# Patient Record
Sex: FEMALE | Race: WHITE | NOT HISPANIC OR LATINO | Employment: OTHER | URBAN - METROPOLITAN AREA
[De-identification: names, ages, dates, MRNs, and addresses within clinical notes are randomized per-mention and may not be internally consistent; named-entity substitution may affect disease eponyms.]

---

## 2017-01-03 RX ORDER — LISINOPRIL 40 MG/1
TABLET ORAL
Qty: 30 TABLET | Refills: 11 | Status: SHIPPED | OUTPATIENT
Start: 2017-01-03 | End: 2018-03-15 | Stop reason: SDUPTHER

## 2017-01-10 ENCOUNTER — OFFICE VISIT (OUTPATIENT)
Dept: INTERNAL MEDICINE | Facility: CLINIC | Age: 82
End: 2017-01-10
Payer: MEDICARE

## 2017-01-10 VITALS
WEIGHT: 145.75 LBS | HEIGHT: 61 IN | DIASTOLIC BLOOD PRESSURE: 62 MMHG | RESPIRATION RATE: 18 BRPM | BODY MASS INDEX: 27.52 KG/M2 | HEART RATE: 76 BPM | SYSTOLIC BLOOD PRESSURE: 140 MMHG | TEMPERATURE: 99 F

## 2017-01-10 DIAGNOSIS — E11.3599 TYPE 2 DIABETES MELLITUS WITH PROLIFERATIVE RETINOPATHY WITHOUT MACULAR EDEMA, WITHOUT LONG-TERM CURRENT USE OF INSULIN, UNSPECIFIED LATERALITY: ICD-10-CM

## 2017-01-10 DIAGNOSIS — I10 ESSENTIAL HYPERTENSION: Primary | ICD-10-CM

## 2017-01-10 DIAGNOSIS — Z95.0 CARDIAC PACEMAKER IN SITU: ICD-10-CM

## 2017-01-10 DIAGNOSIS — I51.89 DIASTOLIC DYSFUNCTION: ICD-10-CM

## 2017-01-10 DIAGNOSIS — I65.23 BILATERAL CAROTID ARTERY STENOSIS: ICD-10-CM

## 2017-01-10 DIAGNOSIS — Z12.11 SCREEN FOR COLON CANCER: ICD-10-CM

## 2017-01-10 DIAGNOSIS — E78.00 HYPERCHOLESTEREMIA: ICD-10-CM

## 2017-01-10 PROCEDURE — 99214 OFFICE O/P EST MOD 30 MIN: CPT | Mod: S$PBB,,, | Performed by: INTERNAL MEDICINE

## 2017-01-10 PROCEDURE — 99213 OFFICE O/P EST LOW 20 MIN: CPT | Mod: PBBFAC,PO | Performed by: INTERNAL MEDICINE

## 2017-01-10 PROCEDURE — 99999 PR PBB SHADOW E&M-EST. PATIENT-LVL III: CPT | Mod: PBBFAC,,, | Performed by: INTERNAL MEDICINE

## 2017-01-10 PROCEDURE — 82270 OCCULT BLOOD FECES: CPT | Mod: PBBFAC,PO | Performed by: INTERNAL MEDICINE

## 2017-01-10 RX ORDER — HYDROCHLOROTHIAZIDE 12.5 MG/1
12.5 CAPSULE ORAL DAILY
COMMUNITY
End: 2017-12-15

## 2017-01-10 NOTE — MR AVS SNAPSHOT
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, without long-term current use of insulin, unspecified laterality         Hypercholesteremia         Cardiac pacemaker in situ         Bilateral carotid artery stenosis         Screen for colon cancer         Diastolic dysfunction                To Do List           Future Appointments        Provider Department Dept Phone    2017 8:30 AM SPECIMEN, [facility] - Specimen Lab [phone]    2017 8:45 AM LAB, [facility] [place] - Laboratory [phone]    2017 8:00 AM HOME MONITOR DEVICE CHECK, [facility] - Arrhythmia [phone]      Goals (5 Years of Data)     None      Follow-Up and Disposition     Return in about 6 months (around 7/10/2017).      [facility]     [facility] On Call Nurse Care Line -  Assistance  Registered nurses in the [facility] On Call Center provide clinical advisement, health education, appointment booking, and other advisory services.  Call for this free service at [phone].             Medications           Message regarding Medications     Verify the changes and/or additions to your medication regime listed below are the same as discussed with your clinician today.  If any of these changes or additions are incorrect, please notify your healthcare provider.        STOP taking these medications     BIOTIN ORAL Take by mouth once daily.    cranberry 500 mg Cap Take by mouth once daily.           Verify that the below list of medications is an accurate representation of the medications  "you are currently taking.  If none reported, the list may be blank. If incorrect, please contact your healthcare provider. Carry this list with you in case of emergency.           Current Medications     ACCU-CHEK GERMÁN PLUS TEST STRP Strp USE ONE STRIP TO TEST DAILY.    ascorbic acid (VITAMIN C) 1000 MG tablet Take 1,000 mg by mouth once daily.    CALCIUM CARBONATE/VITAMIN D3 (VITAMIN D-3 ORAL) Take 500 mg by mouth once daily.    co-enzyme Q-10 30 mg capsule Take 30 mg by mouth once daily.     hydrochlorothiazide (MICROZIDE) 12.5 mg capsule Take 12.5 mg by mouth once daily.    KRILL OIL ORAL Take by mouth once daily.     lisinopril (PRINIVIL,ZESTRIL) 40 MG tablet TAKE ONE TABLET BY MOUTH EVERY DAY    ONE TOUCH DELICA LANCETS 33 gauge Misc     red yeast rice 600 mg Cap Take 1,200 mg by mouth 2 (two) times daily.    blood sugar diagnostic (ACCU-CHEK GERMÁN PLUS TEST STRP) Strp USE ONE STRIP TO TEST DAILY.    glucosamine-chondroitin 500-400 mg tablet Take 1 tablet by mouth 3 (three) times daily.           Clinical Reference Information           Vital Signs - Last Recorded  Most recent update: 1/10/2017  3:34 PM by Isis Richter,     BP Pulse Temp Resp Ht Wt    (!) 140/62 (BP Location: Left arm, Patient Position: Sitting, BP Method: Manual) 76 98.6 °F (37 °C) (Oral) 18 5' 1" (1.549 m) 66.1 kg (145 lb 11.6 oz)    BMI                27.53 kg/m2          Blood Pressure          Most Recent Value    BP  (!)  140/62      Allergies as of 1/10/2017     No Known Allergies      Immunizations Administered on Date of Encounter - 1/10/2017     None      Orders Placed During Today's Visit      Normal Orders This Visit    POCT Hemocult Stool X3     Future Labs/Procedures Expected by Expires    CBC auto differential  1/10/2017 1/10/2019    Comprehensive metabolic panel  1/10/2017 1/10/2019    Hemoglobin A1c  1/10/2017 3/11/2018    Lipid panel  1/10/2017 1/10/2019    Microalbumin/creatinine urine ratio  1/10/2017 1/10/2018    " TSH  1/10/2017 1/10/2019    Urinalysis  1/10/2017 1/10/2019    2D Echo w/ Color Flow Doppler  As directed 1/10/2018    Cardiology Lab Carotid US Bilateral  As directed 1/10/2018      [facility] Sign-Up     Activating your [facility] account is as easy as 1-2-3!     1) Visit [email or web address], select Sign Up Now, enter this activation code and your date of birth, then select Next.  [email or web address]  Expires: 2/24/2017  3:35 PM      2) Create a username and password to use when you visit [facility] in the future and select a security question in case you lose your password and select Next.    3) Enter your e-mail address and click Sign Up!    Additional Information  If you have questions, please e-mail [email or web address] or call [phone] to talk to our [facility] staff. Remember, [facility] is NOT to be used for urgent needs. For medical emergencies, dial 911.

## 2017-01-10 NOTE — PROGRESS NOTES
Subjective:       Patient ID: Luiza Childers is a 83 y.o. female.    Chief Complaint: No chief complaint on file.    Patient is a 83 y.o.female who presents today for follow up. Son present during examination.    Cholesterol: (due now)  Vaccines: Influenza (declines); Tetanus (declines) Pneumovax (declines); Zoster (declines)  Eye exam: up to date  Mammogram: declines  Gyn exam: hysterectomy  Colonoscopy: declines; agrees stool cards  DEXA: declines  Exercise: she is very active  Diet: healthy    Review of Systems   Constitutional: Negative for appetite change, chills, diaphoresis, fatigue and fever.   HENT: Negative for congestion, dental problem, ear discharge, ear pain, hearing loss, postnasal drip, sinus pressure and sore throat.    Eyes: Negative for discharge, redness and itching.   Respiratory: Negative for cough, chest tightness, shortness of breath and wheezing.    Cardiovascular: Negative for chest pain, palpitations and leg swelling.   Gastrointestinal: Negative for abdominal pain, constipation, diarrhea, nausea and vomiting.   Endocrine: Negative for cold intolerance and heat intolerance.   Genitourinary: Negative for difficulty urinating, frequency, hematuria and urgency.   Musculoskeletal: Negative for arthralgias, back pain, gait problem, myalgias and neck pain.   Skin: Negative for color change and rash.   Neurological: Negative for dizziness, syncope and headaches.   Hematological: Negative for adenopathy.   Psychiatric/Behavioral: Negative for behavioral problems and sleep disturbance. The patient is not nervous/anxious.        Objective:      Physical Exam   Constitutional: She is oriented to person, place, and time. She appears well-developed and well-nourished. No distress.   HENT:   Head: Normocephalic and atraumatic.   Right Ear: Tympanic membrane and external ear normal.   Left Ear: Tympanic membrane and external ear normal.   Nose: Nose normal. No mucosal edema or rhinorrhea.    Mouth/Throat: Uvula is midline, oropharynx is clear and moist and mucous membranes are normal. No oropharyngeal exudate, posterior oropharyngeal edema, posterior oropharyngeal erythema or tonsillar abscesses.   Eyes: Conjunctivae and EOM are normal. Pupils are equal, round, and reactive to light. Right eye exhibits no discharge. Left eye exhibits no discharge. No scleral icterus.   Neck: Normal range of motion. Neck supple. No JVD present. No thyromegaly present.   Cardiovascular: Normal rate, regular rhythm, normal heart sounds and intact distal pulses.  Exam reveals no gallop and no friction rub.    No murmur heard.  Pulmonary/Chest: Effort normal and breath sounds normal. No stridor. No respiratory distress. She has no wheezes. She has no rales. She exhibits no tenderness.   Abdominal: Soft. Bowel sounds are normal. She exhibits no distension. There is no tenderness. There is no rebound.   Musculoskeletal: Normal range of motion. She exhibits no edema or tenderness.   Lymphadenopathy:     She has no cervical adenopathy.   Neurological: She is alert and oriented to person, place, and time.   Skin: Skin is warm. No rash noted. She is not diaphoretic. No erythema.   Psychiatric: She has a normal mood and affect. Her behavior is normal.   Nursing note and vitals reviewed.      Assessment and Plan:       1. Essential hypertension  - stable on lisinopril and hctz  - CBC auto differential; Future  - Comprehensive metabolic panel; Future  - TSH; Future  - Lipid panel; Future  - Hemoglobin A1c; Future  - Urinalysis; Future  - Microalbumin/creatinine urine ratio; Future    2. 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 Hypercholesteremia  - diet controlled  - CBC auto differential; Future  - Comprehensive metabolic panel; Future  - TSH; Future  - Lipid panel; Future  - Hemoglobin A1c; Future  - Urinalysis; Future  - Microalbumin/creatinine urine ratio; Future    4. Cardiac pacemaker in situ  - follows with cardio  - CBC auto differential; Future  - Comprehensive metabolic panel; Future  - TSH; Future  - Lipid panel; Future  - Hemoglobin A1c; Future  - Urinalysis; Future  - Microalbumin/creatinine urine ratio; Future    5. Bilateral carotid artery stenosis  - Cardiology Lab Carotid US Bilateral; Future    6. Screen for colon cancer  - POCT Hemocult Stool X3    7. Diastolic dysfunction  - 2D Echo w/ Color Flow Doppler; Future        No Follow-up on file.

## 2017-01-11 ENCOUNTER — LAB VISIT (OUTPATIENT)
Dept: LAB | Facility: HOSPITAL | Age: 82
End: 2017-01-11
Attending: INTERNAL MEDICINE
Payer: MEDICARE

## 2017-01-11 ENCOUNTER — TELEPHONE (OUTPATIENT)
Dept: INTERNAL MEDICINE | Facility: CLINIC | Age: 82
End: 2017-01-11

## 2017-01-11 DIAGNOSIS — N18.30 CONTROLLED TYPE 2 DIABETES MELLITUS WITH STAGE 3 CHRONIC KIDNEY DISEASE, WITHOUT LONG-TERM CURRENT USE OF INSULIN: ICD-10-CM

## 2017-01-11 DIAGNOSIS — I65.23 BILATERAL CAROTID ARTERY STENOSIS: ICD-10-CM

## 2017-01-11 DIAGNOSIS — I10 ESSENTIAL HYPERTENSION: ICD-10-CM

## 2017-01-11 DIAGNOSIS — E11.3599 TYPE 2 DIABETES MELLITUS WITH PROLIFERATIVE RETINOPATHY WITHOUT MACULAR EDEMA, WITHOUT LONG-TERM CURRENT USE OF INSULIN, UNSPECIFIED LATERALITY: ICD-10-CM

## 2017-01-11 DIAGNOSIS — Z95.0 CARDIAC PACEMAKER IN SITU: ICD-10-CM

## 2017-01-11 DIAGNOSIS — E11.22 CONTROLLED TYPE 2 DIABETES MELLITUS WITH STAGE 3 CHRONIC KIDNEY DISEASE, WITHOUT LONG-TERM CURRENT USE OF INSULIN: ICD-10-CM

## 2017-01-11 DIAGNOSIS — Z12.11 SCREEN FOR COLON CANCER: ICD-10-CM

## 2017-01-11 DIAGNOSIS — E78.00 HYPERCHOLESTEREMIA: ICD-10-CM

## 2017-01-11 DIAGNOSIS — E78.5 HYPERLIPIDEMIA, UNSPECIFIED HYPERLIPIDEMIA TYPE: Primary | ICD-10-CM

## 2017-01-11 LAB
ALBUMIN SERPL BCP-MCNC: 4.1 G/DL
ALP SERPL-CCNC: 45 U/L
ALT SERPL W/O P-5'-P-CCNC: 12 U/L
ANION GAP SERPL CALC-SCNC: 9 MMOL/L
AST SERPL-CCNC: 19 U/L
BASOPHILS # BLD AUTO: 0.01 K/UL
BASOPHILS NFR BLD: 0.2 %
BILIRUB SERPL-MCNC: 0.3 MG/DL
BUN SERPL-MCNC: 35 MG/DL
CALCIUM SERPL-MCNC: 10.1 MG/DL
CHLORIDE SERPL-SCNC: 99 MMOL/L
CHOLEST/HDLC SERPL: 6.3 {RATIO}
CO2 SERPL-SCNC: 26 MMOL/L
CREAT SERPL-MCNC: 1.1 MG/DL
DIFFERENTIAL METHOD: ABNORMAL
EOSINOPHIL # BLD AUTO: 0.3 K/UL
EOSINOPHIL NFR BLD: 4.9 %
ERYTHROCYTE [DISTWIDTH] IN BLOOD BY AUTOMATED COUNT: 13.5 %
EST. GFR  (AFRICAN AMERICAN): 53.7 ML/MIN/1.73 M^2
EST. GFR  (NON AFRICAN AMERICAN): 46.5 ML/MIN/1.73 M^2
ESTIMATED AVG GLUCOSE: 140 MG/DL
GLUCOSE SERPL-MCNC: 143 MG/DL
HBA1C MFR BLD HPLC: 6.5 %
HCT VFR BLD AUTO: 35.9 %
HDL/CHOLESTEROL RATIO: 15.9 %
HDLC SERPL-MCNC: 347 MG/DL
HDLC SERPL-MCNC: 55 MG/DL
HGB BLD-MCNC: 11.7 G/DL
LDLC SERPL CALC-MCNC: 265.4 MG/DL
LYMPHOCYTES # BLD AUTO: 1.7 K/UL
LYMPHOCYTES NFR BLD: 32 %
MCH RBC QN AUTO: 28.7 PG
MCHC RBC AUTO-ENTMCNC: 32.6 %
MCV RBC AUTO: 88 FL
MONOCYTES # BLD AUTO: 0.4 K/UL
MONOCYTES NFR BLD: 8.1 %
NEUTROPHILS # BLD AUTO: 2.9 K/UL
NEUTROPHILS NFR BLD: 54.6 %
NONHDLC SERPL-MCNC: 292 MG/DL
PLATELET # BLD AUTO: 237 K/UL
PMV BLD AUTO: 10.6 FL
POTASSIUM SERPL-SCNC: 4.5 MMOL/L
PROT SERPL-MCNC: 7.7 G/DL
RBC # BLD AUTO: 4.08 M/UL
SODIUM SERPL-SCNC: 134 MMOL/L
TRIGL SERPL-MCNC: 133 MG/DL
TSH SERPL DL<=0.005 MIU/L-ACNC: 3.63 UIU/ML
WBC # BLD AUTO: 5.34 K/UL

## 2017-01-11 PROCEDURE — 36415 COLL VENOUS BLD VENIPUNCTURE: CPT | Mod: PO

## 2017-01-11 PROCEDURE — 80053 COMPREHEN METABOLIC PANEL: CPT

## 2017-01-11 PROCEDURE — 80061 LIPID PANEL: CPT

## 2017-01-11 PROCEDURE — 84443 ASSAY THYROID STIM HORMONE: CPT

## 2017-01-11 PROCEDURE — 85025 COMPLETE CBC W/AUTO DIFF WBC: CPT

## 2017-01-11 PROCEDURE — 83036 HEMOGLOBIN GLYCOSYLATED A1C: CPT

## 2017-01-11 NOTE — TELEPHONE ENCOUNTER
Notify pt of results:   - diabetic marker increased from 5.8 to 6.5; recommend starting diabetic diet; please mail to patient; we discussed it a bit during her visit  - thyroid is normal  - kidney function has decreased due to diabetes worsening; she is no longer a prediabetic she is a diabetic; f/u with three months with labs  - cholesterol is uncontrolled; LDL is 265; we need to start lipitor to see if it lowers her levels; if she is agreeable i will send to her pharmacy; also mail low cholesterol diet; limit fried foods, red meat and cheese  - blood counts are stable  - diabetes and high cholesterol places her at risk for heart attack so we need to get these controlled

## 2017-01-12 RX ORDER — ATORVASTATIN CALCIUM 20 MG/1
20 TABLET, FILM COATED ORAL NIGHTLY
Qty: 30 TABLET | Refills: 6 | Status: SHIPPED | OUTPATIENT
Start: 2017-01-12 | End: 2018-05-08

## 2017-01-12 NOTE — TELEPHONE ENCOUNTER
Spoke to pt and pt's son, reviewed lab result. Pt verbalized understanding. Please send lipitor to pharmacy. Diets mailed to pt.

## 2017-01-12 NOTE — TELEPHONE ENCOUNTER
Med sent; Repeat cmp one month after starting lipitor to make sure it is not affecting liver; nonfasting lab; stop red yeast rice when starting lipitor; start coenzyme q 10 supplement daily with lipitor

## 2017-02-22 RX ORDER — HYDROCHLOROTHIAZIDE 12.5 MG/1
CAPSULE ORAL
Qty: 30 CAPSULE | Refills: 11 | Status: SHIPPED | OUTPATIENT
Start: 2017-02-22 | End: 2017-04-12 | Stop reason: SDUPTHER

## 2017-02-27 ENCOUNTER — CLINICAL SUPPORT (OUTPATIENT)
Dept: ELECTROPHYSIOLOGY | Facility: CLINIC | Age: 82
End: 2017-02-27
Payer: MEDICARE

## 2017-02-27 DIAGNOSIS — Z95.0 CARDIAC PACEMAKER IN SITU: ICD-10-CM

## 2017-02-27 DIAGNOSIS — R00.1 SYMPTOMATIC BRADYCARDIA: ICD-10-CM

## 2017-02-27 PROCEDURE — 93294 REM INTERROG EVL PM/LDLS PM: CPT | Mod: ,,, | Performed by: INTERNAL MEDICINE

## 2017-02-27 PROCEDURE — 93296 REM INTERROG EVL PM/IDS: CPT | Mod: PBBFAC | Performed by: INTERNAL MEDICINE

## 2017-03-14 ENCOUNTER — TELEPHONE (OUTPATIENT)
Dept: INTERNAL MEDICINE | Facility: CLINIC | Age: 82
End: 2017-03-14

## 2017-03-14 NOTE — TELEPHONE ENCOUNTER
----- Message from Nicol Beckham LPN sent at 1/10/2017  3:15 PM CST -----  Carotid US and Echo due in March, already ordered.

## 2017-03-28 LAB
CTP QC/QA: YES
FECAL OCCULT BLOOD, POC: NEGATIVE

## 2017-04-12 ENCOUNTER — OFFICE VISIT (OUTPATIENT)
Dept: INTERNAL MEDICINE | Facility: CLINIC | Age: 82
End: 2017-04-12
Payer: MEDICARE

## 2017-04-12 ENCOUNTER — TELEPHONE (OUTPATIENT)
Dept: INTERNAL MEDICINE | Facility: CLINIC | Age: 82
End: 2017-04-12

## 2017-04-12 VITALS
SYSTOLIC BLOOD PRESSURE: 139 MMHG | WEIGHT: 140 LBS | RESPIRATION RATE: 16 BRPM | BODY MASS INDEX: 26.43 KG/M2 | TEMPERATURE: 99 F | DIASTOLIC BLOOD PRESSURE: 61 MMHG | HEIGHT: 61 IN | HEART RATE: 71 BPM

## 2017-04-12 DIAGNOSIS — I65.23 BILATERAL CAROTID ARTERY STENOSIS: ICD-10-CM

## 2017-04-12 DIAGNOSIS — E78.00 HYPERCHOLESTEREMIA: ICD-10-CM

## 2017-04-12 DIAGNOSIS — I10 ESSENTIAL HYPERTENSION: ICD-10-CM

## 2017-04-12 DIAGNOSIS — R41.3 SHORT-TERM MEMORY LOSS: ICD-10-CM

## 2017-04-12 DIAGNOSIS — E11.3599 TYPE 2 DIABETES MELLITUS WITH PROLIFERATIVE RETINOPATHY WITHOUT MACULAR EDEMA, WITHOUT LONG-TERM CURRENT USE OF INSULIN, UNSPECIFIED LATERALITY: Primary | ICD-10-CM

## 2017-04-12 PROCEDURE — 99999 PR PBB SHADOW E&M-EST. PATIENT-LVL III: CPT | Mod: PBBFAC,,, | Performed by: INTERNAL MEDICINE

## 2017-04-12 PROCEDURE — 99213 OFFICE O/P EST LOW 20 MIN: CPT | Mod: PBBFAC,PO | Performed by: INTERNAL MEDICINE

## 2017-04-12 PROCEDURE — 99214 OFFICE O/P EST MOD 30 MIN: CPT | Mod: S$PBB,,, | Performed by: INTERNAL MEDICINE

## 2017-04-12 NOTE — TELEPHONE ENCOUNTER
1. Call pt to reschedule labs; she cancelled and stated she was going to be out of town; if she doesn't schedule, Let me know and I will call her.   2. Also have kecia call her to schedule neurology    Spoke to her son. When she left the office today, she closed her checking account. Son is very concerned for her mental state of health. He is considering to get legal power of . During visit, pt was agreeable to labs tomorrow and neuro eval; however, she cancelled each after her visit. She is displaying irrational behavior.

## 2017-04-12 NOTE — PROGRESS NOTES
Subjective:       Patient ID: Luiza Childers is a 83 y.o. female.    Chief Complaint: Follow-up (3 month)    Patient is a 83 y.o.female who presents today for follow up.    Cholesterol: (due now)  Vaccines: Influenza (declines); Tetanus (declines) Pneumovax (declines); Zoster (declines)  Eye exam: up to date  Mammogram: declines  Gyn exam: hysterectomy  Colonoscopy: declines; agrees stool cards  DEXA: declines  Exercise: she is very active  Diet: healthy      She is not taking her cholesterol medication.  She does admit to this.    Son is concerned that she is getting dementia. He is not present today as she told him she didn't want him here. He always comes to her appointments. He did notify the clinic that she has been irrational at home; she is blaming him on misplacing her brush , etc. She went to the grocery and her card was declined so she blamed her son. They do live together. She is misplacing items at home. He is worried that she is not taking her medication regularly. Patient agrees that her short term memory is poor but she thinks her son is overacting.         Review of Systems   Constitutional: Negative for appetite change, chills, diaphoresis, fatigue and fever.   HENT: Negative for congestion, dental problem, ear discharge, ear pain, hearing loss, postnasal drip, sinus pressure and sore throat.    Eyes: Negative for discharge, redness and itching.   Respiratory: Negative for cough, chest tightness, shortness of breath and wheezing.    Cardiovascular: Negative for chest pain, palpitations and leg swelling.   Gastrointestinal: Negative for abdominal pain, constipation, diarrhea, nausea and vomiting.   Endocrine: Negative for cold intolerance and heat intolerance.   Genitourinary: Negative for difficulty urinating, frequency, hematuria and urgency.   Musculoskeletal: Negative for arthralgias, back pain, gait problem, myalgias and neck pain.   Skin: Negative for color change and rash.   Neurological:  Negative for dizziness, syncope and headaches.        Memory loss   Hematological: Negative for adenopathy.   Psychiatric/Behavioral: Negative for behavioral problems and sleep disturbance. The patient is not nervous/anxious.        Objective:      Physical Exam   Constitutional: She is oriented to person, place, and time. She appears well-developed and well-nourished. No distress.   HENT:   Head: Normocephalic and atraumatic.   Right Ear: External ear normal.   Left Ear: External ear normal.   Eyes: Conjunctivae and EOM are normal. Pupils are equal, round, and reactive to light. Right eye exhibits no discharge. Left eye exhibits no discharge. No scleral icterus.   Neck: Normal range of motion. Neck supple. No JVD present. No thyromegaly present.   Cardiovascular: Normal rate, regular rhythm, normal heart sounds and intact distal pulses.  Exam reveals no gallop and no friction rub.    No murmur heard.  Pulmonary/Chest: Effort normal and breath sounds normal. No stridor. No respiratory distress. She has no wheezes. She has no rales. She exhibits no tenderness.   Abdominal: Soft. Bowel sounds are normal. She exhibits no distension. There is no tenderness. There is no rebound.   Musculoskeletal: Normal range of motion. She exhibits no edema or tenderness.   Lymphadenopathy:     She has no cervical adenopathy.   Neurological: She is alert and oriented to person, place, and time.   Skin: Skin is warm. No rash noted. She is not diaphoretic. No erythema.   Psychiatric: She has a normal mood and affect. Her speech is normal. Thought content normal. She is withdrawn (irritated with her son). She expresses impulsivity. She exhibits abnormal recent memory.   Nursing note and vitals reviewed.      Assessment and Plan:       1. 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 Hypercholesteremia  - stopped statin against medical advice; check lipid now; will need to restart medication  - Hemoglobin A1c; Future  - Comprehensive metabolic panel; Future  - Lipid panel; Future    3. Essential hypertension  - stable on meds  - Hemoglobin A1c; Future  - Comprehensive metabolic panel; Future  - Lipid panel; Future    4. Bilateral carotid artery stenosis  - ultrasound due in july  - Hemoglobin A1c; Future  - Comprehensive metabolic panel; Future  - Lipid panel; Future    5. Short-term memory loss  - Ambulatory Referral to Neurology  - concerned that her behavior is irrational; her disposition today is not her usual.         Return in about 3 months (around 7/12/2017).

## 2017-04-12 NOTE — TELEPHONE ENCOUNTER
Pt's son called to discuss a few things prior to Dr. Larson seeing pt today. He stated that pt's cognitive memory is going. She is misplacing items and accusing son that he is hiding them on her on purpose. He stated that she normally doesn't go places i.e. Grocery shopping, alone due to being overwhelmed at the check out process of people looking at her and her having to pull out personal information. He reported that she went alone the other day and her credit card was declined, pt's son said that he was accused of this problem. He stated that pt called a lock's smith and had a lock put on her bedroom door, but this morning it was just left wide open. He stated he does live with pt to help keep an eye on her, he is attached to her bank accounts but denies every using them. He is concerned that if she goes switching things she isn't going to remember and he won't have access to help her. He also stated that pt isn't taking lipitor, and he isn't sure she is taking her medications regularly. He stated that he tried to get her a medicine planner, but she refused stating that she has her own system of turning bottles over once taken. The problem is that they are always turned over. He stated that pt gets upset when he tries to suggest healthier snack choices claiming that he is trying to control her. He stated that he wanted to bring her to the apt today but pt refused to allow him.

## 2017-04-12 NOTE — TELEPHONE ENCOUNTER
----- Message from Natacha Gloria sent at 4/12/2017 11:17 AM CDT -----  Contact: Aashish underwood- [phone]  Follow up about mom's appointment on today.

## 2017-04-12 NOTE — MR AVS SNAPSHOT
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      [facility] On Call     [facility] Nurse Care Line -  Assistance  Unless otherwise directed by your provider, please contact [facility], our nurse care line that is available for  assistance.     Registered nurses in the [facility] Center provide: appointment scheduling, clinical advisement, health education, and other advisory services.  Call: [phone] (toll free)               Medications           Message regarding Medications     Verify the changes and/or additions to your medication regime listed below are the same as discussed with your clinician today.  If any of these changes or additions are incorrect, please notify your healthcare provider.             Verify that the below list of medications is an accurate representation of the medications you are currently taking.  If none reported, the list may be blank. If incorrect, please contact  "your healthcare provider. Carry this list with you in case of emergency.           Current Medications     ACCU-CHEK GERMÁN PLUS TEST STRP Strp USE ONE STRIP TO TEST DAILY.    ascorbic acid (VITAMIN C) 1000 MG tablet Take 1,000 mg by mouth once daily.    atorvastatin (LIPITOR) 20 MG tablet Take 1 tablet (20 mg total) by mouth every evening.    blood sugar diagnostic (ACCU-CHEK GERMÁN PLUS TEST STRP) Strp USE ONE STRIP TO TEST DAILY.    CALCIUM CARBONATE/VITAMIN D3 (VITAMIN D-3 ORAL) Take 500 mg by mouth once daily.    co-enzyme Q-10 30 mg capsule Take 30 mg by mouth once daily.     glucosamine-chondroitin 500-400 mg tablet Take 1 tablet by mouth 3 (three) times daily.    hydrochlorothiazide (MICROZIDE) 12.5 mg capsule Take 12.5 mg by mouth once daily.    KRILL OIL ORAL Take by mouth once daily.     lisinopril (PRINIVIL,ZESTRIL) 40 MG tablet TAKE ONE TABLET BY MOUTH EVERY DAY    ONE TOUCH DELICA LANCETS 33 gauge [facility]            Clinical Reference Information           Your Vitals Were     BP Pulse Temp Resp Height Weight    139/61 (BP Location: Left arm, Patient Position: Sitting, BP Method: Manual) 71 98.5 °F (36.9 °C) (Oral) 16 5' 1" (1.549 m) 63.5 kg (139 lb 15.9 oz)    BMI                26.45 kg/m2          Blood Pressure          Most Recent Value    BP  139/61      Allergies as of 4/12/2017     No Known Allergies      Immunizations Administered on Date of Encounter - 4/12/2017     None      Orders Placed During Today's Visit      Normal Orders This Visit    Ambulatory Referral to Neurology     Future Labs/Procedures Expected by Expires    Comprehensive metabolic panel  4/12/2017 4/12/2019    Hemoglobin A1c  4/12/2017 6/11/2018    Lipid panel  4/12/2017 4/12/2019      [facility] Sign-Up     Activating your [facility] account is as easy as 1-2-3!     1) Visit [email or web address], select Sign Up Now, enter this activation code and your date of birth, then select Next.  [email or web address]  Expires: 5/27/2017  9:05 AM  "     2) Create a username and password to use when you visit [facility] in the future and select a security question in case you lose your password and select Next.    3) Enter your e-mail address and click Sign Up!    Additional Information  If you have questions, please e-mail [email or web address] or call [phone] to talk to our [facility] [employer] staff. Remember, [facility] is NOT to be used for urgent needs. For medical emergencies, dial 911.         Language Assistance Services     ATTENTION: Language assistance services are available, free of charge. Please call [phone].      ATENCIÓN: Si habla español, tiene a jewell disposición servicios gratuitos de asistencia lingüística. Llame al [phone].     CHÚ Ý: N?u b?n nói Ti?ng Vi?t, có các d?ch v? h? tr? ngôn ng? mi?n phí dành cho b?n. G?i s? [phone].         [place] [facility] complies with applicable Federal civil rights laws and does not discriminate on the basis of race, color, national origin, age, disability, or sex.

## 2017-04-12 NOTE — TELEPHONE ENCOUNTER
Spoke to pt's son, briefly discussed apt with pt's son. He stated that she left her apt here and closed a checking account. Informed that message would be sent to Dr. Larson to see if she has time to contact pt's son. He verbalized understanding if  is not able to.

## 2017-04-12 NOTE — TELEPHONE ENCOUNTER
----- Message from Wild Meza sent at 4/12/2017  8:19 AM CDT -----  Contact: Aashish/ [phone]  Pt son would like to speak to nurse/doctor regarding pt conditions.    Please advise

## 2017-04-13 NOTE — TELEPHONE ENCOUNTER
Spoke to pt who stated that she cancelled her apts because her car battery . Stated that she didn't have transportation. She stated that someone is bringing her a new battery tomorrow. Offered to schedule lab apt next week. Pt refused stating that she is going out of town on  to [place] to visit her daughter's family. Asked her return date, she stated that she wasn't sure when she would be returning.

## 2017-05-30 ENCOUNTER — CLINICAL SUPPORT (OUTPATIENT)
Dept: ELECTROPHYSIOLOGY | Facility: CLINIC | Age: 82
End: 2017-05-30
Payer: MEDICARE

## 2017-05-30 DIAGNOSIS — R00.1 SYMPTOMATIC BRADYCARDIA: ICD-10-CM

## 2017-05-30 DIAGNOSIS — Z95.0 CARDIAC PACEMAKER IN SITU: ICD-10-CM

## 2017-05-30 PROCEDURE — 93294 REM INTERROG EVL PM/LDLS PM: CPT | Mod: ,,, | Performed by: INTERNAL MEDICINE

## 2017-08-02 ENCOUNTER — TELEPHONE (OUTPATIENT)
Dept: ELECTROPHYSIOLOGY | Facility: CLINIC | Age: 82
End: 2017-08-02

## 2017-08-02 NOTE — TELEPHONE ENCOUNTER
----- Message from Brina Wiggins sent at 8/2/2017  4:17 PM CDT -----  Contact: pt   Pt called to see if she could change doctors to a doctor at the [place] location.  I explained to her that there is not an electrophysiologist that goes to [place].  She said she never sees Dr. Del Angel, she always sees Martita.  She called earlier this morning.  She can be reached @ [phone].  She states it is too far for her to come.  Lin spoke to her also today too.  Thanks

## 2017-08-02 NOTE — TELEPHONE ENCOUNTER
Explained to patient that the appointment scheduled on 9/8 is her annual in clinic check. Patient was stating her age saying she's to old to be traveling back and forward from the hospital.Patient was requesting to have her device check via remotely.Patient was informed that her in clinic check appointment is needed. Patient acknowledged and will have her son to bring her into the clinic day of scheduled appointment.

## 2017-08-02 NOTE — TELEPHONE ENCOUNTER
----- Message from Cathy Danielle MA sent at 8/2/2017  3:40 PM CDT -----  Contact: patient called  Please call the patient at home she need  To talk to you about her visit on 9-8-2017. Thank you.

## 2017-08-22 NOTE — TELEPHONE ENCOUNTER
Reason for call:  Patient reporting a symptom  Symptom or request: back surgery  Duration (how long have symptoms been present): 1 week ago  Have you been treated for this before? Yes  Additional comments: patient would like more pain medication that was given at the hospital  Phone Number patient can be reached at:  Home number on file [phone] (home)  Best Time:  any  Can we leave a detailed message on this number:  YES  Call taken on 8/22/2017 at 10:58 AM by AMAURI CUADRA   Returned patient call. Appointment scheduled for follow-up appointment and mailed to patient. Patient confirmed appointments.

## 2017-08-31 ENCOUNTER — TELEPHONE (OUTPATIENT)
Dept: ELECTROPHYSIOLOGY | Facility: CLINIC | Age: 82
End: 2017-08-31

## 2017-08-31 NOTE — TELEPHONE ENCOUNTER
[employer] has devised a cybersecurity upgrade to their firmware. The upgrades will take place during routine device checks, using the device . There is a very rare (approximately 1 in 30,000) chance that the pacemaker upgrade will result in device failure. Our clinic is in the process of devising a plan for patients like yourself. We will be in contact with you. These upgrades are not critical and will be performed over the upcoming months.

## 2017-09-30 DIAGNOSIS — R55 SYNCOPE, UNSPECIFIED SYNCOPE TYPE: Primary | ICD-10-CM

## 2017-10-25 DIAGNOSIS — R55 SYNCOPE, UNSPECIFIED SYNCOPE TYPE: Primary | ICD-10-CM

## 2017-10-26 ENCOUNTER — TELEPHONE (OUTPATIENT)
Dept: INTERNAL MEDICINE | Facility: CLINIC | Age: 82
End: 2017-10-26

## 2017-10-26 ENCOUNTER — CLINICAL SUPPORT (OUTPATIENT)
Dept: CARDIOLOGY | Facility: CLINIC | Age: 82
End: 2017-10-26
Payer: MEDICARE

## 2017-10-26 DIAGNOSIS — I51.89 DIASTOLIC DYSFUNCTION: ICD-10-CM

## 2017-10-26 LAB
ESTIMATED PA SYSTOLIC PRESSURE: 23.07
MITRAL VALVE REGURGITATION: ABNORMAL
RETIRED EF AND QEF - SEE NOTES: 60 (ref 55–65)

## 2017-10-26 PROCEDURE — 93306 TTE W/DOPPLER COMPLETE: CPT | Mod: PBBFAC,PO | Performed by: INTERNAL MEDICINE

## 2017-10-27 NOTE — TELEPHONE ENCOUNTER
Notify pt and her son that her echo reveals a progression in her mitral valve disease; recommend f/u with dr. Del Angel in cardiology

## 2017-10-30 NOTE — TELEPHONE ENCOUNTER
----- Message from Tammy Chang sent at 10/27/2017  4:30 PM CDT -----  Contact: Ketan Zendejas [phone]  Patient is returning a phone call.  Who left a message for the patient: Nicol  Does patient know what this is regarding:    Comments:

## 2017-10-31 ENCOUNTER — TELEPHONE (OUTPATIENT)
Dept: ELECTROPHYSIOLOGY | Facility: CLINIC | Age: 82
End: 2017-10-31

## 2017-10-31 NOTE — TELEPHONE ENCOUNTER
Returned sons call. He stated Dr Hennessy's office wanted to make sure dr Del Angel saw results of  Echo. Advised him that DR Del Angel did see results. Explained her heart functions was normal and as a now the moderate regurgitation would be to watch. Advised that Dr Del Angel would see them on 12/15 at her next appt. He voiced understanding.

## 2017-10-31 NOTE — TELEPHONE ENCOUNTER
----- Message from Suman Del Angel MD sent at 10/31/2017  3:31 PM CDT -----  Contact: pt son/Aashish  yes  normal LVEF, moderate mitral regurgitation.  pt should have an appt coming up, right?  DPM    ----- Message -----  From: Elizabeth Lujan RN  Sent: 10/31/2017   3:24 PM  To: Suman Del Angel MD    Did you see results    ----- Message -----  From: Trish Kahn  Sent: 10/30/2017  11:00 AM  To: Elizabeth Ljuan RN    Please call pt son at [phone]. Need to discuss the ECHO results done 10/26/17 ordered by Dr Del Angel    Thank you

## 2017-12-15 ENCOUNTER — OFFICE VISIT (OUTPATIENT)
Dept: ELECTROPHYSIOLOGY | Facility: CLINIC | Age: 82
End: 2017-12-15
Payer: MEDICARE

## 2017-12-15 ENCOUNTER — CLINICAL SUPPORT (OUTPATIENT)
Dept: ELECTROPHYSIOLOGY | Facility: CLINIC | Age: 82
End: 2017-12-15
Payer: MEDICARE

## 2017-12-15 VITALS
DIASTOLIC BLOOD PRESSURE: 58 MMHG | BODY MASS INDEX: 27.6 KG/M2 | WEIGHT: 146.19 LBS | HEIGHT: 61 IN | SYSTOLIC BLOOD PRESSURE: 164 MMHG | HEART RATE: 60 BPM

## 2017-12-15 DIAGNOSIS — I49.5 SSS (SICK SINUS SYNDROME): ICD-10-CM

## 2017-12-15 DIAGNOSIS — I10 ESSENTIAL HYPERTENSION: Primary | ICD-10-CM

## 2017-12-15 DIAGNOSIS — R00.1 SYMPTOMATIC BRADYCARDIA: ICD-10-CM

## 2017-12-15 DIAGNOSIS — Z95.0 CARDIAC PACEMAKER: ICD-10-CM

## 2017-12-15 DIAGNOSIS — Z95.0 CARDIAC PACEMAKER IN SITU: ICD-10-CM

## 2017-12-15 DIAGNOSIS — E78.00 HYPERCHOLESTEREMIA: ICD-10-CM

## 2017-12-15 DIAGNOSIS — R55 SYNCOPE, UNSPECIFIED SYNCOPE TYPE: ICD-10-CM

## 2017-12-15 PROCEDURE — 99999 PR PBB SHADOW E&M-EST. PATIENT-LVL III: CPT | Mod: PBBFAC,,, | Performed by: INTERNAL MEDICINE

## 2017-12-15 PROCEDURE — 99213 OFFICE O/P EST LOW 20 MIN: CPT | Mod: PBBFAC | Performed by: INTERNAL MEDICINE

## 2017-12-15 PROCEDURE — 93280 PM DEVICE PROGR EVAL DUAL: CPT | Mod: PBBFAC | Performed by: INTERNAL MEDICINE

## 2017-12-15 PROCEDURE — 93005 ELECTROCARDIOGRAM TRACING: CPT | Mod: PBBFAC | Performed by: INTERNAL MEDICINE

## 2017-12-15 PROCEDURE — 99214 OFFICE O/P EST MOD 30 MIN: CPT | Mod: S$PBB,,, | Performed by: INTERNAL MEDICINE

## 2017-12-15 PROCEDURE — 93010 ELECTROCARDIOGRAM REPORT: CPT | Mod: ,,, | Performed by: INTERNAL MEDICINE

## 2017-12-15 RX ORDER — CHLORTHALIDONE 25 MG/1
25 TABLET ORAL DAILY
Qty: 30 TABLET | Refills: 11 | Status: SHIPPED | OUTPATIENT
Start: 2017-12-15 | End: 2018-02-14 | Stop reason: SDUPTHER

## 2017-12-15 NOTE — PROGRESS NOTES
Subjective:    Patient ID:  Luiza Childers is a 84 y.o. female who presents for evaluation of No chief complaint on file.      HPI   84 y.o. F  DM   HTN on meds    HL on meds     Long hx of bradycardia.  Had syncope at [employer]: fell over in pew. Felt warm, nauseated.  Typically has fairly good exertion tolerance, though recently she's noted that she has a hard time walking a mile.  In addition, she feels less mentally acute than usual.  No CP, no FRANK. No syncope ever before.    Since PPM placed, no recurrence. 84% A paced.  Feels very well.    Holter: SR/SB, 32-79 bpm. Profound SB while sleeping, with junctional escape.  Echo 60% LVEF, 2+ MR.  TSH wnl    My interpretation of today's ECG is APVS    Review of Systems   Constitution: Negative for weakness.   HENT: Negative.  Negative for ear pain and tinnitus.    Eyes: Negative for blurred vision.   Cardiovascular: Negative for chest pain, dyspnea on exertion, near-syncope and palpitations.   Respiratory: Negative.  Negative for shortness of breath.    Endocrine: Negative.  Negative for polyuria.   Hematologic/Lymphatic: Does not bruise/bleed easily.   Skin: Negative.  Negative for rash.   Musculoskeletal: Negative.  Negative for joint pain and muscle weakness.   Gastrointestinal: Negative.  Negative for abdominal pain and change in bowel habit.   Genitourinary: Negative for frequency.   Neurological: Positive for difficulty with concentration. Negative for dizziness.   Psychiatric/Behavioral: Negative.  Negative for depression. The patient is not nervous/anxious.    Allergic/Immunologic: Negative for environmental allergies.        Objective:    Physical Exam   Constitutional: She is oriented to person, place, and time. Vital signs are normal. She appears well-developed and well-nourished. She is active and cooperative.   HENT:   Head: Normocephalic and atraumatic.   Eyes: Conjunctivae and EOM are normal.   Neck: Normal range of motion. Carotid bruit is not present.  No tracheal deviation and no edema present. No thyroid mass and no thyromegaly present.   Cardiovascular: Regular rhythm, normal heart sounds, intact distal pulses and normal pulses.   No extrasystoles are present. Bradycardia present.  PMI is not displaced.  Exam reveals no gallop and no friction rub.    No murmur heard.  Pulmonary/Chest: Effort normal and breath sounds normal. No respiratory distress. She has no wheezes. She has no rales.   Abdominal: Soft. Normal appearance. She exhibits no distension. There is no hepatosplenomegaly.   Musculoskeletal: Normal range of motion.   Neurological: She is alert and oriented to person, place, and time. Coordination normal.   Skin: Skin is warm and dry. No rash noted.   Psychiatric: She has a normal mood and affect. Her speech is normal and behavior is normal. Thought content normal. Cognition and memory are normal.   Nursing note and vitals reviewed.        Assessment:       1. Essential hypertension    2. Hypercholesteremia    3. Symptomatic bradycardia    4. Cardiac pacemaker in situ    5. Syncope, unspecified syncope type         Plan:       Doing great since PPM.  Cont f/u in device clinic.  Return in 1 year with echo, or earlier prn.

## 2018-02-14 DIAGNOSIS — I10 ESSENTIAL HYPERTENSION: Primary | ICD-10-CM

## 2018-02-14 RX ORDER — CHLORTHALIDONE 25 MG/1
25 TABLET ORAL DAILY
Qty: 90 TABLET | Refills: 3 | Status: SHIPPED | OUTPATIENT
Start: 2018-02-14 | End: 2018-05-08

## 2018-03-15 ENCOUNTER — TELEPHONE (OUTPATIENT)
Dept: INTERNAL MEDICINE | Facility: CLINIC | Age: 83
End: 2018-03-15

## 2018-03-15 RX ORDER — LISINOPRIL 40 MG/1
40 TABLET ORAL DAILY
Qty: 30 TABLET | Refills: 0 | Status: SHIPPED | OUTPATIENT
Start: 2018-03-15 | End: 2018-04-10 | Stop reason: SDUPTHER

## 2018-03-16 RX ORDER — LISINOPRIL 40 MG/1
TABLET ORAL
Qty: 30 TABLET | Refills: 0 | Status: SHIPPED | OUTPATIENT
Start: 2018-03-16 | End: 2018-05-02

## 2018-03-19 ENCOUNTER — CLINICAL SUPPORT (OUTPATIENT)
Dept: ELECTROPHYSIOLOGY | Facility: CLINIC | Age: 83
End: 2018-03-19
Attending: INTERNAL MEDICINE
Payer: MEDICARE

## 2018-03-19 DIAGNOSIS — R00.1 SYMPTOMATIC BRADYCARDIA: ICD-10-CM

## 2018-03-19 DIAGNOSIS — Z95.0 CARDIAC PACEMAKER IN SITU: ICD-10-CM

## 2018-03-19 PROCEDURE — 93294 REM INTERROG EVL PM/LDLS PM: CPT | Mod: ,,, | Performed by: INTERNAL MEDICINE

## 2018-03-19 PROCEDURE — 93296 REM INTERROG EVL PM/IDS: CPT | Mod: PBBFAC | Performed by: INTERNAL MEDICINE

## 2018-04-10 RX ORDER — LISINOPRIL 40 MG/1
40 TABLET ORAL DAILY
Qty: 30 TABLET | Refills: 0 | Status: SHIPPED | OUTPATIENT
Start: 2018-04-10 | End: 2018-05-08 | Stop reason: SDUPTHER

## 2018-04-23 ENCOUNTER — TELEPHONE (OUTPATIENT)
Dept: INTERNAL MEDICINE | Facility: CLINIC | Age: 83
End: 2018-04-23

## 2018-04-23 NOTE — TELEPHONE ENCOUNTER
Son is aware that Dr Belle likes to see pt first,then do lab after appt,or set up lab.Lab appt cancelled.

## 2018-04-23 NOTE — TELEPHONE ENCOUNTER
----- Message from Shanta Miller sent at 4/23/2018 10:17 AM CDT -----  Contact: Aashish/Son/[phone]  Doctor appointment and lab have been scheduled.  Please link lab orders to the lab appointment.  Date of doctor appointment:  05/08/18  Physical or EP:  Physical  Date of lab appointment: 05/03/18   Comments: please attach order.Patient son stated that he want patient to be seen by Dr Belle, because is the patient who decide who to see. Thank you!!!

## 2018-05-02 RX ORDER — LISINOPRIL 40 MG/1
40 TABLET ORAL DAILY
Qty: 30 TABLET | Refills: 0 | OUTPATIENT
Start: 2018-05-02

## 2018-05-08 ENCOUNTER — LAB VISIT (OUTPATIENT)
Dept: LAB | Facility: HOSPITAL | Age: 83
End: 2018-05-08
Attending: FAMILY MEDICINE
Payer: MEDICARE

## 2018-05-08 ENCOUNTER — OFFICE VISIT (OUTPATIENT)
Dept: INTERNAL MEDICINE | Facility: CLINIC | Age: 83
End: 2018-05-08
Payer: MEDICARE

## 2018-05-08 DIAGNOSIS — Z00.00 ROUTINE GENERAL MEDICAL EXAMINATION AT A HEALTH CARE FACILITY: ICD-10-CM

## 2018-05-08 DIAGNOSIS — I10 ESSENTIAL HYPERTENSION: Chronic | ICD-10-CM

## 2018-05-08 DIAGNOSIS — R79.9 ABNORMAL FINDING OF BLOOD CHEMISTRY: ICD-10-CM

## 2018-05-08 DIAGNOSIS — Z95.0 CARDIAC PACEMAKER IN SITU: Chronic | ICD-10-CM

## 2018-05-08 DIAGNOSIS — E78.00 HYPERCHOLESTEREMIA: ICD-10-CM

## 2018-05-08 DIAGNOSIS — E11.9 TYPE 2 DIABETES MELLITUS WITHOUT COMPLICATION, WITHOUT LONG-TERM CURRENT USE OF INSULIN: ICD-10-CM

## 2018-05-08 DIAGNOSIS — E11.3293 MILD NONPROLIFERATIVE DIABETIC RETINOPATHY OF BOTH EYES ASSOCIATED WITH TYPE 2 DIABETES MELLITUS, MACULAR EDEMA PRESENCE UNSPECIFIED: ICD-10-CM

## 2018-05-08 DIAGNOSIS — Z00.00 ROUTINE GENERAL MEDICAL EXAMINATION AT A HEALTH CARE FACILITY: Primary | ICD-10-CM

## 2018-05-08 DIAGNOSIS — I65.23 BILATERAL CAROTID ARTERY STENOSIS: Chronic | ICD-10-CM

## 2018-05-08 DIAGNOSIS — R00.1 SYMPTOMATIC BRADYCARDIA: ICD-10-CM

## 2018-05-08 LAB
ALBUMIN SERPL BCP-MCNC: 3.8 G/DL
ALP SERPL-CCNC: 49 U/L
ALT SERPL W/O P-5'-P-CCNC: 10 U/L
ANION GAP SERPL CALC-SCNC: 9 MMOL/L
AST SERPL-CCNC: 14 U/L
BASOPHILS # BLD AUTO: 0.03 K/UL
BASOPHILS NFR BLD: 0.5 %
BILIRUB SERPL-MCNC: 0.2 MG/DL
BUN SERPL-MCNC: 29 MG/DL
CALCIUM SERPL-MCNC: 10.2 MG/DL
CHLORIDE SERPL-SCNC: 102 MMOL/L
CHOLEST SERPL-MCNC: 288 MG/DL
CHOLEST/HDLC SERPL: 6.9 {RATIO}
CO2 SERPL-SCNC: 26 MMOL/L
CREAT SERPL-MCNC: 1.4 MG/DL
DIFFERENTIAL METHOD: ABNORMAL
EOSINOPHIL # BLD AUTO: 0.2 K/UL
EOSINOPHIL NFR BLD: 2.6 %
ERYTHROCYTE [DISTWIDTH] IN BLOOD BY AUTOMATED COUNT: 12.9 %
EST. GFR  (AFRICAN AMERICAN): 39.8 ML/MIN/1.73 M^2
EST. GFR  (NON AFRICAN AMERICAN): 34.5 ML/MIN/1.73 M^2
ESTIMATED AVG GLUCOSE: 146 MG/DL
GLUCOSE SERPL-MCNC: 166 MG/DL
HBA1C MFR BLD HPLC: 6.7 %
HCT VFR BLD AUTO: 32 %
HDLC SERPL-MCNC: 42 MG/DL
HDLC SERPL: 14.6 %
HGB BLD-MCNC: 10.1 G/DL
IMM GRANULOCYTES # BLD AUTO: 0.02 K/UL
IMM GRANULOCYTES NFR BLD AUTO: 0.3 %
LDLC SERPL CALC-MCNC: 202.4 MG/DL
LYMPHOCYTES # BLD AUTO: 1.3 K/UL
LYMPHOCYTES NFR BLD: 19.4 %
MCH RBC QN AUTO: 28.9 PG
MCHC RBC AUTO-ENTMCNC: 31.6 G/DL
MCV RBC AUTO: 91 FL
MONOCYTES # BLD AUTO: 0.4 K/UL
MONOCYTES NFR BLD: 5.4 %
NEUTROPHILS # BLD AUTO: 4.8 K/UL
NEUTROPHILS NFR BLD: 71.8 %
NONHDLC SERPL-MCNC: 246 MG/DL
NRBC BLD-RTO: 0 /100 WBC
PLATELET # BLD AUTO: 241 K/UL
PMV BLD AUTO: 11.4 FL
POTASSIUM SERPL-SCNC: 4.7 MMOL/L
PROT SERPL-MCNC: 7.7 G/DL
RBC # BLD AUTO: 3.5 M/UL
SODIUM SERPL-SCNC: 137 MMOL/L
T4 FREE SERPL-MCNC: 1.19 NG/DL
TRIGL SERPL-MCNC: 218 MG/DL
TSH SERPL DL<=0.005 MIU/L-ACNC: 2.82 UIU/ML
WBC # BLD AUTO: 6.61 K/UL

## 2018-05-08 PROCEDURE — 84443 ASSAY THYROID STIM HORMONE: CPT

## 2018-05-08 PROCEDURE — 99214 OFFICE O/P EST MOD 30 MIN: CPT | Mod: S$PBB,ICN,, | Performed by: FAMILY MEDICINE

## 2018-05-08 PROCEDURE — 83036 HEMOGLOBIN GLYCOSYLATED A1C: CPT

## 2018-05-08 PROCEDURE — 84439 ASSAY OF FREE THYROXINE: CPT

## 2018-05-08 PROCEDURE — 80053 COMPREHEN METABOLIC PANEL: CPT

## 2018-05-08 PROCEDURE — 36415 COLL VENOUS BLD VENIPUNCTURE: CPT | Mod: PO

## 2018-05-08 PROCEDURE — 99213 OFFICE O/P EST LOW 20 MIN: CPT | Mod: PBBFAC,PO | Performed by: FAMILY MEDICINE

## 2018-05-08 PROCEDURE — 80061 LIPID PANEL: CPT

## 2018-05-08 PROCEDURE — 99999 PR PBB SHADOW E&M-EST. PATIENT-LVL III: CPT | Mod: PBBFAC,,, | Performed by: FAMILY MEDICINE

## 2018-05-08 PROCEDURE — 85025 COMPLETE CBC W/AUTO DIFF WBC: CPT

## 2018-05-08 RX ORDER — ASPIRIN 81 MG/1
81 TABLET ORAL EVERY OTHER DAY
COMMUNITY
End: 2020-06-16

## 2018-05-08 RX ORDER — LISINOPRIL 20 MG/1
20 TABLET ORAL DAILY
Qty: 90 TABLET | Refills: 1 | Status: SHIPPED | OUTPATIENT
Start: 2018-05-08 | End: 2018-07-10

## 2018-05-10 VITALS
WEIGHT: 146.81 LBS | TEMPERATURE: 98 F | SYSTOLIC BLOOD PRESSURE: 140 MMHG | BODY MASS INDEX: 27.72 KG/M2 | DIASTOLIC BLOOD PRESSURE: 78 MMHG | HEIGHT: 61 IN | HEART RATE: 62 BPM

## 2018-05-10 PROBLEM — I65.23 BILATERAL CAROTID ARTERY STENOSIS: Chronic | Status: ACTIVE | Noted: 2017-01-10

## 2018-05-10 NOTE — PROGRESS NOTES
Subjective:   Patient ID: Luiza Childers is a 84 y.o. female.    Chief Complaint: Annual Exam      HPI  85 yo female here with her son today for annual exam since her pcp is out. Asymptomatic.    Patient queried and denies any further complaints.    PREVENTIVE MED  Diet  Exercise  Colorectal Ca  Alcohol use  Tobacco  BP  Depression  Type 2 DM  Hep C  STD  Vision  ALL REVIEWED    ALLERGIES AND MEDICATIONS: updated and reviewed.  Review of patient's allergies indicates:  No Known Allergies    Current Outpatient Prescriptions:     ascorbic acid (VITAMIN C) 1000 MG tablet, Take 1,000 mg by mouth once daily., Disp: , Rfl:     aspirin (ECOTRIN) 81 MG EC tablet, Take 81 mg by mouth once daily., Disp: , Rfl:     CALCIUM CARBONATE/VITAMIN D3 (VITAMIN D-3 ORAL), Take 500 mg by mouth once daily., Disp: , Rfl:     co-enzyme Q-10 30 mg capsule, Take 30 mg by mouth once daily. , Disp: , Rfl:     glucosamine-chondroitin 500-400 mg tablet, Take 1 tablet by mouth 3 (three) times daily., Disp: , Rfl:     KRILL OIL ORAL, Take by mouth once daily. , Disp: , Rfl:     ACCU-CHEK GERMÁN PLUS TEST STRP Strp, USE ONE STRIP TO TEST DAILY., Disp: 300 strip, Rfl: 1    blood sugar diagnostic (ACCU-CHEK GERMÁN PLUS TEST STRP) Strp, USE ONE STRIP TO TEST DAILY., Disp: 100 strip, Rfl: 11    lisinopril (PRINIVIL,ZESTRIL) 20 MG tablet, Take 1 tablet (20 mg total) by mouth once daily., Disp: 90 tablet, Rfl: 1    ONE TOUCH DELICA LANCETS 33 gauge Misc, , Disp: , Rfl:     Review of Systems   Constitutional: Negative for activity change, appetite change, chills, diaphoresis, fatigue, fever and unexpected weight change.   HENT: Negative for congestion, ear discharge, ear pain, facial swelling, hearing loss, nosebleeds, postnasal drip, rhinorrhea, sinus pressure, sneezing, sore throat, tinnitus, trouble swallowing and voice change.    Eyes: Negative for photophobia, pain, discharge, redness, itching and visual disturbance.   Respiratory:  "Negative for cough, chest tightness, shortness of breath and wheezing.    Cardiovascular: Negative for chest pain, palpitations and leg swelling.   Gastrointestinal: Negative for abdominal distention, abdominal pain, anal bleeding, blood in stool, constipation, diarrhea, nausea, rectal pain and vomiting.   Endocrine: Negative for cold intolerance, heat intolerance, polydipsia, polyphagia and polyuria.   Genitourinary: Negative for difficulty urinating, dysuria and flank pain.   Musculoskeletal: Negative for arthralgias, back pain, joint swelling, myalgias and neck pain.   Skin: Negative for rash.   Neurological: Negative for dizziness, tremors, seizures, syncope, speech difficulty, weakness, light-headedness, numbness and headaches.   Psychiatric/Behavioral: Negative for behavioral problems, confusion, decreased concentration, dysphoric mood, sleep disturbance and suicidal ideas. The patient is not nervous/anxious and is not hyperactive.        Objective:     Vitals:    05/08/18 0901   BP: (!) 140/78   Pulse: 62   Temp: 98.3 °F (36.8 °C)   TempSrc: Oral   Weight: 66.6 kg (146 lb 13.2 oz)   Height: 5' 1" (1.549 m)   PainSc: 0-No pain     Body mass index is 27.74 kg/m².    Physical Exam   Constitutional: She is oriented to person, place, and time. She appears well-developed and well-nourished. She is cooperative. She does not have a sickly appearance. No distress.   HENT:   Head: Normocephalic and atraumatic.   Right Ear: Hearing, tympanic membrane, external ear and ear canal normal. No tenderness.   Left Ear: Hearing, tympanic membrane, external ear and ear canal normal. No tenderness.   Nose: Nose normal.   Mouth/Throat: Oropharynx is clear and moist.   Eyes: Conjunctivae and lids are normal. Pupils are equal, round, and reactive to light. Right eye exhibits no discharge. Left eye exhibits no discharge. Right conjunctiva is not injected. Left conjunctiva is not injected. No scleral icterus. Right eye exhibits normal " extraocular motion. Left eye exhibits normal extraocular motion.   Neck: Normal range of motion. Neck supple. No JVD present. Carotid bruit is not present. No tracheal deviation and no edema present. No thyromegaly present.   Cardiovascular: Normal rate, regular rhythm, normal heart sounds and normal pulses.  Exam reveals no friction rub.    No murmur heard.  Pulmonary/Chest: Effort normal and breath sounds normal. No accessory muscle usage. No respiratory distress. She has no wheezes. She has no rhonchi. She has no rales.   Abdominal: Soft. Bowel sounds are normal. She exhibits no distension, no abdominal bruit, no pulsatile midline mass and no mass. There is no hepatosplenomegaly. There is no tenderness. There is no rebound, no guarding, no CVA tenderness, no tenderness at McBurney's point and negative Haywood's sign.   Musculoskeletal: She exhibits no edema.   Lymphadenopathy:        Head (right side): No submandibular, no preauricular and no posterior auricular adenopathy present.        Head (left side): No submandibular, no preauricular and no posterior auricular adenopathy present.     She has no cervical adenopathy.   Neurological: She is alert and oriented to person, place, and time. GCS eye subscore is 4. GCS verbal subscore is 5. GCS motor subscore is 6.   Skin: Skin is warm and dry. No ecchymosis and no rash noted. Rash is not maculopapular and not urticarial. She is not diaphoretic. No cyanosis or erythema. Nails show no clubbing.   Psychiatric: She has a normal mood and affect. Her speech is normal and behavior is normal. Thought content normal. Her mood appears not anxious. Her affect is not angry and not inappropriate. She does not exhibit a depressed mood.       Assessment and Plan:   Luiza was seen today for annual exam.    Diagnoses and all orders for this visit:    Routine general medical examination at a health care facility  -     CBC auto differential; Future  -     Comprehensive metabolic  panel; Future  -     Lipid panel; Future  -     TSH; Future  -     T4, free; Future  -     Hemoglobin A1c; Future  -     Microalbumin/creatinine urine ratio; Future    Type 2 diabetes mellitus without complication, without long-term current use of insulin  -     Comprehensive metabolic panel; Future  -     TSH; Future  -     T4, free; Future  -     Hemoglobin A1c; Future  -     Microalbumin/creatinine urine ratio; Future    Abnormal finding of blood chemistry   -     CBC auto differential; Future  -     Lipid panel; Future    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        Follow-up in about 3 months (around 8/8/2018).    THIS NOTE WILL BE SHARED WITH THE PATIENT.

## 2018-05-16 ENCOUNTER — TELEPHONE (OUTPATIENT)
Dept: INTERNAL MEDICINE | Facility: CLINIC | Age: 83
End: 2018-05-16

## 2018-05-16 NOTE — TELEPHONE ENCOUNTER
----- Message from Yany Chang sent at 5/16/2018 10:47 AM CDT -----  Contact: Pt Son [phone]  Patient would like to get test results    Name of test (lab, mammo, etc.):   Lab    Date of test:  5/8    Ordering provider: Yoshi    Where was the test performed:  [facility]    Would you prefer a response via [employer]?No    Comments:

## 2018-06-21 ENCOUNTER — CLINICAL SUPPORT (OUTPATIENT)
Dept: ELECTROPHYSIOLOGY | Facility: CLINIC | Age: 83
End: 2018-06-21
Payer: MEDICARE

## 2018-06-21 DIAGNOSIS — Z95.0 CARDIAC PACEMAKER IN SITU: ICD-10-CM

## 2018-06-21 DIAGNOSIS — R00.1 SYMPTOMATIC BRADYCARDIA: ICD-10-CM

## 2018-06-21 PROCEDURE — 93296 REM INTERROG EVL PM/IDS: CPT | Mod: PBBFAC | Performed by: INTERNAL MEDICINE

## 2018-06-21 PROCEDURE — 93294 REM INTERROG EVL PM/LDLS PM: CPT | Mod: ,,, | Performed by: INTERNAL MEDICINE

## 2018-07-09 ENCOUNTER — TELEPHONE (OUTPATIENT)
Dept: INTERNAL MEDICINE | Facility: CLINIC | Age: 83
End: 2018-07-09

## 2018-07-09 NOTE — TELEPHONE ENCOUNTER
----- Message from Blanca Pop LPN sent at 7/9/2018  8:59 AM CDT -----  Contact: Son Aashish Leigh # [phone]      ----- Message -----  From: Nicolette Gonzalez  Sent: 7/9/2018   8:47 AM  To: Yoshi Torres Staff    Patient's son is calling in regards to [facility] not waiting to refill the script for lisinopril (PRINIVIL,ZESTRIL) 20 MG tablet for his mother, he said that they said that it's too soon. He said that his mother was given a 40 mg script that was decreased down to a 20 mg script. He said she was taking her medication twice a day due to patient's elevated blood pressure so she have completed the medication sooner.

## 2018-07-09 NOTE — TELEPHONE ENCOUNTER
----- Message from Jazmin Mendez sent at 7/9/2018  3:09 PM CDT -----  Contact: Ketan Zendejas [phone]  Patient is returning a phone call.  Who left a message for the patient: Nicol Beckham LPN   Does patient know what this is regarding:  Refill  Comments:

## 2018-07-10 RX ORDER — LISINOPRIL 40 MG/1
40 TABLET ORAL DAILY
Qty: 30 TABLET | Refills: 11 | Status: SHIPPED | OUTPATIENT
Start: 2018-07-10 | End: 2018-07-10 | Stop reason: SDUPTHER

## 2018-07-10 RX ORDER — LISINOPRIL 40 MG/1
40 TABLET ORAL DAILY
Qty: 30 TABLET | Refills: 11 | Status: SHIPPED | OUTPATIENT
Start: 2018-07-10 | End: 2019-04-01 | Stop reason: SDUPTHER

## 2018-07-10 NOTE — TELEPHONE ENCOUNTER
"Spoke to pt's son who stated that he was monitoring pt's B/P and increased dosage back to 40 mg due to elevated B/P. He did not speak to another physician regarding this. Informed that I have no record that he was to increase and therefore no indication for an early refill. Asked for recent B/P readings and he stated "it's better now that she is taking 40 mg" he stated that pt hasn't left the house since last o/v with Dr. Belle and was upset about the large bill received. He stated that she would not be easily convinced to come in for an o/v. He also stated that pt took last dose today.     Please advise on refill of lisinopril 40 mg  "

## 2018-07-10 NOTE — TELEPHONE ENCOUNTER
Refilled medication at 40 mg dosage  Advise no further adjustment without notifying clinic first  Monitor bp readings in AM x 1 week on this dosage and call clinic with these readings to make sure this dosage is appropriate  rtc in august for f/u

## 2018-07-10 NOTE — TELEPHONE ENCOUNTER
----- Message from Kami Jones sent at 7/10/2018  3:02 PM CDT -----  Contact: Son Aashish Childers    [phone]  Nicol patient son is still waiting on your call back from this morning .

## 2018-07-10 NOTE — TELEPHONE ENCOUNTER
Spoke to pt's son and informed of Dr. Larson's recommendations. He verbalized understanding. Booked apt for August.

## 2018-08-06 NOTE — PROGRESS NOTES
Subjective:       Patient ID: Luiza Childers is a 85 y.o. female.    Chief Complaint: Follow-up (HTN)    Patient is a 85 y.o.female with diabetes type 2 who presents today for follow up.    Cholesterol: due now  Vaccines: Influenza (declines); Tetanus (declines) Pneumovax (declines); Zoster (declines)  Eye exam: due now  Mammogram: declines  Gyn exam: hysterectomy  Colonoscopy: declines  DEXA: declines    She is aware of and disturbed by her short-term memory loss. She misplaces objects and cannot recall where they had gone. She accuses her son of hiding things from her, but knows this is unreasonable.     Review of Systems   Constitutional: Negative for appetite change, chills, diaphoresis, fatigue and fever.   HENT: Negative for congestion, dental problem, ear discharge, ear pain, hearing loss, postnasal drip, sinus pressure and sore throat.    Eyes: Negative for discharge, redness and itching.   Respiratory: Negative for cough, chest tightness, shortness of breath and wheezing.    Cardiovascular: Negative for chest pain, palpitations and leg swelling.   Gastrointestinal: Negative for abdominal pain, constipation, diarrhea, nausea and vomiting.   Endocrine: Negative for cold intolerance and heat intolerance.   Genitourinary: Negative for difficulty urinating, frequency, hematuria and urgency.   Musculoskeletal: Negative for arthralgias, back pain, gait problem, myalgias and neck pain.   Skin: Negative for color change and rash.   Neurological: Negative for dizziness, syncope and headaches.   Hematological: Negative for adenopathy.   Psychiatric/Behavioral: Negative for behavioral problems and sleep disturbance. The patient is not nervous/anxious.         (+) memory changes       Objective:      Physical Exam   Constitutional: She is oriented to person, place, and time. She appears well-developed and well-nourished. No distress.   HENT:   Head: Normocephalic and atraumatic.   Right Ear: External ear normal.    Left Ear: External ear normal.   Nose: Nose normal.   Mouth/Throat: Oropharynx is clear and moist. No oropharyngeal exudate.   Eyes: Conjunctivae and EOM are normal. Pupils are equal, round, and reactive to light. Right eye exhibits no discharge. Left eye exhibits no discharge. No scleral icterus.   Neck: Normal range of motion. Neck supple. No JVD present. No thyromegaly present.   Cardiovascular: Normal rate, regular rhythm, normal heart sounds and intact distal pulses. Exam reveals no gallop and no friction rub.   No murmur heard.  Pulses:       Dorsalis pedis pulses are 2+ on the right side, and 2+ on the left side.        Posterior tibial pulses are 2+ on the right side, and 2+ on the left side.   Pulmonary/Chest: Effort normal and breath sounds normal. No stridor. No respiratory distress. She has no wheezes. She has no rales. She exhibits no tenderness.   Abdominal: Soft. Bowel sounds are normal. She exhibits no distension. There is no tenderness. There is no rebound.   Musculoskeletal: Normal range of motion. She exhibits no edema or tenderness.        Right foot: There is normal range of motion and no deformity.        Left foot: There is normal range of motion and no deformity.   Feet:   Right Foot:   Protective Sensation: 3 sites tested. 3 sites sensed.   Skin Integrity: Negative for ulcer, blister, skin breakdown, erythema, warmth, callus or dry skin.   Left Foot:   Protective Sensation: 3 sites tested. 3 sites sensed.   Skin Integrity: Negative for ulcer, blister, skin breakdown, erythema, warmth, callus or dry skin.   Lymphadenopathy:     She has no cervical adenopathy.   Neurological: She is alert and oriented to person, place, and time. No cranial nerve deficit.   Skin: Skin is warm and dry. No rash noted. She is not diaphoretic. No erythema.   Psychiatric: She has a normal mood and affect. Her behavior is normal.   Nursing note and vitals reviewed.      Assessment and Plan:       1. Type 2 diabetes  mellitus with proliferative retinopathy without macular edema, without long-term current use of insulin, unspecified laterality  - diet controlled; pt not compliant with diet; discussed importance of diabetic diet  - CBC auto differential; Future  - Comprehensive metabolic panel; Future  - Hemoglobin A1c; Future  - Lipid panel; Future  - Ambulatory Referral to Optometry    2. Hypercholesteremia  - has tried statin in the past and stopped it, willing to try again if ldl is still high  - CBC auto differential; Future  - Comprehensive metabolic panel; Future  - Hemoglobin A1c; Future  - Lipid panel; Future    3. Essential hypertension  - good control on lisinopril  - CBC auto differential; Future  - Comprehensive metabolic panel; Future  - Hemoglobin A1c; Future  - Lipid panel; Future    4. Bilateral carotid artery stenosis    - CBC auto differential; Future  - Comprehensive metabolic panel; Future  - Hemoglobin A1c; Future  - Lipid panel; Future    5. Anemia, unspecified type    - CBC auto differential; Future  - Comprehensive metabolic panel; Future  - Hemoglobin A1c; Future  - Lipid panel; Future  - Iron and TIBC; Future  - Ferritin; Future    6. CKD stage 3 due to type 2 diabetes mellitus    - CBC auto differential; Future  - Comprehensive metabolic panel; Future  - Hemoglobin A1c; Future  - Lipid panel; Future    7. Memory loss    - Ambulatory Referral to Neurology          No Follow-up on file.

## 2018-08-14 ENCOUNTER — OFFICE VISIT (OUTPATIENT)
Dept: INTERNAL MEDICINE | Facility: CLINIC | Age: 83
End: 2018-08-14
Payer: MEDICARE

## 2018-08-14 ENCOUNTER — LAB VISIT (OUTPATIENT)
Dept: LAB | Facility: HOSPITAL | Age: 83
End: 2018-08-14
Attending: INTERNAL MEDICINE
Payer: MEDICARE

## 2018-08-14 VITALS
HEIGHT: 61 IN | WEIGHT: 146.38 LBS | SYSTOLIC BLOOD PRESSURE: 120 MMHG | HEART RATE: 60 BPM | TEMPERATURE: 98 F | RESPIRATION RATE: 18 BRPM | OXYGEN SATURATION: 97 % | DIASTOLIC BLOOD PRESSURE: 68 MMHG | BODY MASS INDEX: 27.64 KG/M2

## 2018-08-14 DIAGNOSIS — E11.22 CKD STAGE 3 DUE TO TYPE 2 DIABETES MELLITUS: ICD-10-CM

## 2018-08-14 DIAGNOSIS — E11.3599 TYPE 2 DIABETES MELLITUS WITH PROLIFERATIVE RETINOPATHY WITHOUT MACULAR EDEMA, WITHOUT LONG-TERM CURRENT USE OF INSULIN, UNSPECIFIED LATERALITY: Primary | ICD-10-CM

## 2018-08-14 DIAGNOSIS — E78.00 HYPERCHOLESTEREMIA: Chronic | ICD-10-CM

## 2018-08-14 DIAGNOSIS — D64.9 ANEMIA, UNSPECIFIED TYPE: ICD-10-CM

## 2018-08-14 DIAGNOSIS — N18.30 CKD STAGE 3 DUE TO TYPE 2 DIABETES MELLITUS: ICD-10-CM

## 2018-08-14 DIAGNOSIS — I10 ESSENTIAL HYPERTENSION: Chronic | ICD-10-CM

## 2018-08-14 DIAGNOSIS — I65.23 BILATERAL CAROTID ARTERY STENOSIS: Chronic | ICD-10-CM

## 2018-08-14 DIAGNOSIS — E11.3599 TYPE 2 DIABETES MELLITUS WITH PROLIFERATIVE RETINOPATHY WITHOUT MACULAR EDEMA, WITHOUT LONG-TERM CURRENT USE OF INSULIN, UNSPECIFIED LATERALITY: ICD-10-CM

## 2018-08-14 DIAGNOSIS — R41.3 MEMORY LOSS: ICD-10-CM

## 2018-08-14 LAB
ALBUMIN SERPL BCP-MCNC: 3.9 G/DL
ALP SERPL-CCNC: 51 U/L
ALT SERPL W/O P-5'-P-CCNC: 13 U/L
ANION GAP SERPL CALC-SCNC: 9 MMOL/L
AST SERPL-CCNC: 16 U/L
BASOPHILS # BLD AUTO: 0.02 K/UL
BASOPHILS NFR BLD: 0.3 %
BILIRUB SERPL-MCNC: 0.4 MG/DL
BUN SERPL-MCNC: 26 MG/DL
CALCIUM SERPL-MCNC: 10.1 MG/DL
CHLORIDE SERPL-SCNC: 106 MMOL/L
CHOLEST SERPL-MCNC: 328 MG/DL
CHOLEST/HDLC SERPL: 6.3 {RATIO}
CO2 SERPL-SCNC: 24 MMOL/L
CREAT SERPL-MCNC: 1.2 MG/DL
DIFFERENTIAL METHOD: ABNORMAL
EOSINOPHIL # BLD AUTO: 0.2 K/UL
EOSINOPHIL NFR BLD: 2.9 %
ERYTHROCYTE [DISTWIDTH] IN BLOOD BY AUTOMATED COUNT: 14.2 %
EST. GFR  (AFRICAN AMERICAN): 47.6 ML/MIN/1.73 M^2
EST. GFR  (NON AFRICAN AMERICAN): 41.3 ML/MIN/1.73 M^2
ESTIMATED AVG GLUCOSE: 148 MG/DL
FERRITIN SERPL-MCNC: 67 NG/ML
GLUCOSE SERPL-MCNC: 155 MG/DL
HBA1C MFR BLD HPLC: 6.8 %
HCT VFR BLD AUTO: 33.7 %
HDLC SERPL-MCNC: 52 MG/DL
HDLC SERPL: 15.9 %
HGB BLD-MCNC: 10.7 G/DL
IMM GRANULOCYTES # BLD AUTO: 0.03 K/UL
IMM GRANULOCYTES NFR BLD AUTO: 0.5 %
IRON SERPL-MCNC: 51 UG/DL
LDLC SERPL CALC-MCNC: 248.4 MG/DL
LYMPHOCYTES # BLD AUTO: 1.1 K/UL
LYMPHOCYTES NFR BLD: 16.9 %
MCH RBC QN AUTO: 27.6 PG
MCHC RBC AUTO-ENTMCNC: 31.8 G/DL
MCV RBC AUTO: 87 FL
MONOCYTES # BLD AUTO: 0.4 K/UL
MONOCYTES NFR BLD: 5.6 %
NEUTROPHILS # BLD AUTO: 4.6 K/UL
NEUTROPHILS NFR BLD: 73.8 %
NONHDLC SERPL-MCNC: 276 MG/DL
NRBC BLD-RTO: 0 /100 WBC
PLATELET # BLD AUTO: 209 K/UL
PMV BLD AUTO: 11.2 FL
POTASSIUM SERPL-SCNC: 4.4 MMOL/L
PROT SERPL-MCNC: 7.4 G/DL
RBC # BLD AUTO: 3.88 M/UL
SATURATED IRON: 16 %
SODIUM SERPL-SCNC: 139 MMOL/L
TOTAL IRON BINDING CAPACITY: 326 UG/DL
TRANSFERRIN SERPL-MCNC: 220 MG/DL
TRIGL SERPL-MCNC: 138 MG/DL
WBC # BLD AUTO: 6.21 K/UL

## 2018-08-14 PROCEDURE — 99999 PR PBB SHADOW E&M-EST. PATIENT-LVL V: CPT | Mod: PBBFAC,,, | Performed by: INTERNAL MEDICINE

## 2018-08-14 PROCEDURE — 80053 COMPREHEN METABOLIC PANEL: CPT

## 2018-08-14 PROCEDURE — 83540 ASSAY OF IRON: CPT

## 2018-08-14 PROCEDURE — 85025 COMPLETE CBC W/AUTO DIFF WBC: CPT

## 2018-08-14 PROCEDURE — 99214 OFFICE O/P EST MOD 30 MIN: CPT | Mod: S$PBB,,, | Performed by: INTERNAL MEDICINE

## 2018-08-14 PROCEDURE — 82728 ASSAY OF FERRITIN: CPT

## 2018-08-14 PROCEDURE — 99215 OFFICE O/P EST HI 40 MIN: CPT | Mod: PBBFAC,PO | Performed by: INTERNAL MEDICINE

## 2018-08-14 PROCEDURE — 36415 COLL VENOUS BLD VENIPUNCTURE: CPT | Mod: PO

## 2018-08-14 PROCEDURE — 80061 LIPID PANEL: CPT

## 2018-08-14 PROCEDURE — 83036 HEMOGLOBIN GLYCOSYLATED A1C: CPT

## 2018-08-14 NOTE — MEDICAL/APP STUDENT
Subjective:       Patient ID: Luiza Childers is a 85 y.o. female.    Chief Complaint: Follow-up (HTN)    HPI Patient is a 85 y.o.female with a background of T2DM, short-term memory loss, HT, and diabetic retinopathy who presents today with her son who is a nurse for follow up for blood pressure. She is managed on 40 mg Lisinopril daily. She is compliant with this. Today she took no medication and her BP measured 160/64. Her son attributes this to white coat HT and notes home measurements yesterday 147/60 before medication and 128/60 in the afternoons after medication. She does not report any chest pain, shortness of breath, visual disturbances, abdominal pain, bowel motion changes, or urinary changes.     Labs from this past May reveal an A1c of 6.7, anemia with hemoglobin 10.1, and elevated cholesterol, TG's, and LDL. She notes a numbness and tingling present bilaterally in her arms. She has not taken any diabetic medications. Patient was started on a statin previously, but did not tolerate. Her diet continues to include many sugary foods. She is reluctant to begin any new medications.     She is aware of and disturbed by her short-term memory loss. She misplaces objects and cannot recall where they had gone. She accuses her son of hiding things from her, but knows this is unreasonable.     Her last appointment with ophthalmology was in September of 2015 with Dr. Robertson who saw her for her diabetic retinopathy. She was managed on Avastin injections, but has since not followed up.    Review of Systems   Constitutional: Negative for activity change, appetite change, chills and fever.   HENT: Negative for congestion, sinus pain and sore throat.    Eyes: Negative for visual disturbance.   Respiratory: Negative for cough and shortness of breath.    Cardiovascular: Negative for chest pain, palpitations and leg swelling.   Gastrointestinal: Negative for abdominal pain, constipation and diarrhea.   Endocrine: Negative  for polydipsia, polyphagia and polyuria.   Genitourinary: Positive for frequency (Wakes up about 2-3x at night to urinate.). Negative for hematuria and urgency.   Musculoskeletal: Positive for arthralgias (Pain located along r. thenar eminance. ) and joint swelling (Swelling along r. thenar eminence.).   Neurological: Negative for dizziness, syncope, speech difficulty, weakness and headaches. Numbness: Numbness and tingling present bilaterally across both arms.        Objective:      Physical Exam   Constitutional: She is oriented to person, place, and time. She appears well-developed and well-nourished.   HENT:   Head: Normocephalic and atraumatic.   Eyes: EOM are normal. Pupils are equal, round, and reactive to light.   Neck: Normal range of motion. Neck supple.   Cardiovascular: Normal rate, regular rhythm, normal heart sounds, intact distal pulses and normal pulses.   Pulmonary/Chest: Effort normal and breath sounds normal.   Abdominal: Soft. Bowel sounds are normal.   Musculoskeletal: She exhibits deformity (Mild flexion deformity across right thumb. ).   Swelling of right thenar eminence.    Neurological: She is alert and oriented to person, place, and time. No sensory deficit (Negative monofilment test on both feet. ). She exhibits normal muscle tone. Coordination normal.   Skin: Skin is warm and dry.       Assessment:       1. 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 Hypercholesteremia    3. Essential hypertension    4. Bilateral carotid artery stenosis    5. Anemia, unspecified type    6. CKD stage 3 due to type 2 diabetes mellitus    7. Memory loss        Plan:    1. Essential HT  - Continue Lisinopril 40mg once daily.     2. Type 2 Diabetes Mellitus  - Comprehensive metabolic panel; Future  - Lipid panel; Future  - Hemoglobin A1c; Future    3. Anemia  - CBC auto differential; Future  - Iron and  TIBC; Future  - Ferritin; Future    4. Diabetic Retinopathy  - Refer to ophthalmology and Dr. Robertson.     5. Short-term memory loss  - Refer to neurology for memory assessment.     Once labs reviewed, follow-up for further management.     Seen and reviewed by Dr. Richter.

## 2018-08-15 ENCOUNTER — TELEPHONE (OUTPATIENT)
Dept: INTERNAL MEDICINE | Facility: CLINIC | Age: 83
End: 2018-08-15

## 2018-08-15 DIAGNOSIS — E78.5 HYPERLIPIDEMIA, UNSPECIFIED HYPERLIPIDEMIA TYPE: Primary | ICD-10-CM

## 2018-08-15 NOTE — LETTER
August 16, 2018    Luiza Childers  [address]  [place] [address]             [place] [facility]  [address] [facility]  [place] [address]  Phone: [phone]  Fax: [phone] Dear Ms. Childers:    As we discussed on the phone, your lab results are as follows:    -Blood counts are stable  -Kidney function is stable  -Diabetic marker is 6.8; continue diabetic diet (guidelines attached)  -Cholesterol has increased to 328 ; recommend pravastatin 20 mg to help lower this value; medication was sent to your pharmacy; continue taking coq10 with this medication; this prevents possible muscle pain from the med      If you have any questions or concerns, please don't hesitate to call.    Sincerely,        Nicol Beckham LPN for Dr. Isis Larson

## 2018-08-15 NOTE — TELEPHONE ENCOUNTER
Notify pt:  Blood counts are stable  Kidney function is stable  Diabetic marker is 6.8; continue diabetic diet  Cholesterol has increased to 328 ; recommend pravastatin 20 mg to help lower this value; if she is agreeable, will send to her pharmacy; advise taking coq10 with this medication; coq10 is otc; this prevents possible muscle pain from the med

## 2018-08-16 RX ORDER — PRAVASTATIN SODIUM 20 MG/1
20 TABLET ORAL NIGHTLY
Qty: 30 TABLET | Refills: 6 | Status: SHIPPED | OUTPATIENT
Start: 2018-08-16 | End: 2018-10-05

## 2018-08-16 NOTE — TELEPHONE ENCOUNTER
Spoke to pt and informed of lab results, pt agreeable to pravastatin, pt already taking coq10. Please send to [facility] on file.     Mailed copy of results per pt request.

## 2018-08-16 NOTE — TELEPHONE ENCOUNTER
----- Message from Dong Ogden sent at 8/15/2018  3:44 PM CDT -----  Contact: Patient [phone]  Patient is returning a phone call.  Who left a message for the patient: Nicol  Does patient know what this is regarding:  No

## 2018-09-20 ENCOUNTER — CLINICAL SUPPORT (OUTPATIENT)
Dept: ELECTROPHYSIOLOGY | Facility: CLINIC | Age: 83
End: 2018-09-20
Payer: MEDICARE

## 2018-09-20 DIAGNOSIS — Z95.0 CARDIAC PACEMAKER IN SITU: ICD-10-CM

## 2018-09-20 DIAGNOSIS — R00.1 SYMPTOMATIC BRADYCARDIA: ICD-10-CM

## 2018-09-20 PROCEDURE — 93294 REM INTERROG EVL PM/LDLS PM: CPT | Mod: ,,, | Performed by: INTERNAL MEDICINE

## 2018-09-20 PROCEDURE — 93296 REM INTERROG EVL PM/IDS: CPT | Mod: PBBFAC | Performed by: INTERNAL MEDICINE

## 2018-09-21 DIAGNOSIS — Z95.0 CARDIAC PACEMAKER IN SITU: Primary | ICD-10-CM

## 2018-10-02 ENCOUNTER — LAB VISIT (OUTPATIENT)
Dept: LAB | Facility: HOSPITAL | Age: 83
End: 2018-10-02
Attending: INTERNAL MEDICINE
Payer: MEDICARE

## 2018-10-02 ENCOUNTER — TELEPHONE (OUTPATIENT)
Dept: INTERNAL MEDICINE | Facility: CLINIC | Age: 83
End: 2018-10-02

## 2018-10-02 DIAGNOSIS — E78.5 HYPERLIPIDEMIA, UNSPECIFIED HYPERLIPIDEMIA TYPE: ICD-10-CM

## 2018-10-02 LAB
ALBUMIN SERPL BCP-MCNC: 3.9 G/DL
ALP SERPL-CCNC: 51 U/L
ALT SERPL W/O P-5'-P-CCNC: 14 U/L
ANION GAP SERPL CALC-SCNC: 9 MMOL/L
AST SERPL-CCNC: 17 U/L
BILIRUB SERPL-MCNC: 0.3 MG/DL
BUN SERPL-MCNC: 28 MG/DL
CALCIUM SERPL-MCNC: 10 MG/DL
CHLORIDE SERPL-SCNC: 103 MMOL/L
CHOLEST SERPL-MCNC: 334 MG/DL
CHOLEST/HDLC SERPL: 7.1 {RATIO}
CO2 SERPL-SCNC: 25 MMOL/L
CREAT SERPL-MCNC: 1.2 MG/DL
EST. GFR  (AFRICAN AMERICAN): 47.6 ML/MIN/1.73 M^2
EST. GFR  (NON AFRICAN AMERICAN): 41.3 ML/MIN/1.73 M^2
GLUCOSE SERPL-MCNC: 183 MG/DL
HDLC SERPL-MCNC: 47 MG/DL
HDLC SERPL: 14.1 %
LDLC SERPL CALC-MCNC: 243 MG/DL
NONHDLC SERPL-MCNC: 287 MG/DL
POTASSIUM SERPL-SCNC: 4.3 MMOL/L
PROT SERPL-MCNC: 7.5 G/DL
SODIUM SERPL-SCNC: 137 MMOL/L
TRIGL SERPL-MCNC: 220 MG/DL

## 2018-10-02 PROCEDURE — 36415 COLL VENOUS BLD VENIPUNCTURE: CPT | Mod: PO

## 2018-10-02 PROCEDURE — 80053 COMPREHEN METABOLIC PANEL: CPT

## 2018-10-02 PROCEDURE — 80061 LIPID PANEL: CPT

## 2018-10-03 NOTE — TELEPHONE ENCOUNTER
Notify pt that her cholesterol has not improved at all; it actually increased;  did she start the pravastatin ordered in august?

## 2018-10-05 RX ORDER — EZETIMIBE 10 MG/1
10 TABLET ORAL DAILY
Qty: 30 TABLET | Refills: 6 | Status: SHIPPED | OUTPATIENT
Start: 2018-10-05 | End: 2020-04-18 | Stop reason: SDUPTHER

## 2018-10-05 NOTE — TELEPHONE ENCOUNTER
Informed pt of worsening cholesterol, pt stated that she isn't taking any statins and won't. Please advise.

## 2019-01-11 ENCOUNTER — OFFICE VISIT (OUTPATIENT)
Dept: ELECTROPHYSIOLOGY | Facility: CLINIC | Age: 84
End: 2019-01-11
Attending: INTERNAL MEDICINE
Payer: MEDICARE

## 2019-01-11 ENCOUNTER — CLINICAL SUPPORT (OUTPATIENT)
Dept: CARDIOLOGY | Facility: HOSPITAL | Age: 84
End: 2019-01-11
Attending: INTERNAL MEDICINE
Payer: MEDICARE

## 2019-01-11 VITALS
HEART RATE: 60 BPM | SYSTOLIC BLOOD PRESSURE: 171 MMHG | WEIGHT: 152.13 LBS | HEIGHT: 61 IN | DIASTOLIC BLOOD PRESSURE: 90 MMHG | BODY MASS INDEX: 28.72 KG/M2

## 2019-01-11 DIAGNOSIS — I10 ESSENTIAL HYPERTENSION: Chronic | ICD-10-CM

## 2019-01-11 DIAGNOSIS — Z95.0 CARDIAC PACEMAKER IN SITU: ICD-10-CM

## 2019-01-11 DIAGNOSIS — Z95.0 CARDIAC PACEMAKER IN SITU: Primary | Chronic | ICD-10-CM

## 2019-01-11 PROCEDURE — 99999 PR PBB SHADOW E&M-EST. PATIENT-LVL III: CPT | Mod: PBBFAC,,, | Performed by: NURSE PRACTITIONER

## 2019-01-11 PROCEDURE — 93010 RHYTHM STRIP: ICD-10-PCS | Mod: S$PBB,,, | Performed by: INTERNAL MEDICINE

## 2019-01-11 PROCEDURE — 99999 PR PBB SHADOW E&M-EST. PATIENT-LVL III: ICD-10-PCS | Mod: PBBFAC,,, | Performed by: NURSE PRACTITIONER

## 2019-01-11 PROCEDURE — 93005 ELECTROCARDIOGRAM TRACING: CPT | Mod: PBBFAC | Performed by: INTERNAL MEDICINE

## 2019-01-11 PROCEDURE — 99214 OFFICE O/P EST MOD 30 MIN: CPT | Mod: S$PBB,,, | Performed by: NURSE PRACTITIONER

## 2019-01-11 PROCEDURE — 93010 ELECTROCARDIOGRAM REPORT: CPT | Mod: S$PBB,,, | Performed by: INTERNAL MEDICINE

## 2019-01-11 PROCEDURE — 99214 PR OFFICE/OUTPT VISIT, EST, LEVL IV, 30-39 MIN: ICD-10-PCS | Mod: S$PBB,,, | Performed by: NURSE PRACTITIONER

## 2019-01-11 PROCEDURE — 99213 OFFICE O/P EST LOW 20 MIN: CPT | Mod: PBBFAC,25 | Performed by: NURSE PRACTITIONER

## 2019-01-11 PROCEDURE — 93288 INTERROG EVL PM/LDLS PM IP: CPT

## 2019-01-11 RX ORDER — CHLORTHALIDONE 25 MG/1
25 TABLET ORAL DAILY
Qty: 30 TABLET | Refills: 11 | Status: SHIPPED | OUTPATIENT
Start: 2019-01-11 | End: 2019-04-01 | Stop reason: SDUPTHER

## 2019-01-11 NOTE — PATIENT INSTRUCTIONS
Start chlorthalidone 25mg daily. Continue other medications.  Blood test in 2 weeks.  Daily walk.  Schedule echo.  Return to clinic in one year, sooner if needed.

## 2019-01-11 NOTE — LETTER
January 11, 2019      Suman Del Angel MD  [address]  [facility] [address]           [facility]  [address]  [facility] [address]  Phone: [phone]  Fax: [phone]          Patient: Luiza Childers   MR Number: [id number]   YOB: 1933   Date of Visit: 1/11/2019       Dear Dr. Suman Del Angel:    Thank you for referring Luiza Childers to me for evaluation. Attached you will find relevant portions of my assessment and plan of care.    If you have questions, please do not hesitate to call me. I look forward to following Luiza Childers along with you.    Sincerely,    Stacy Hamlin, GEOVANNA    Enclosure  CC:  No Recipients    If you would like to receive this communication electronically, please contact [email or web address] or [phone] to request more information on [facility] access.    For providers and/or their staff who would like to refer a patient to [facility], please contact us through our one-stop-shop provider referral line, [facility], at [phone].    If you feel you have received this communication in error or would no longer like to receive these types of communications, please e-mail [email or web address]

## 2019-01-11 NOTE — PROGRESS NOTES
"Ms. Childers is a patient of Dr. Del Angel and was last seen in clinic 12/15/2017.      Subjective:   Patient ID:  Luiza Childers is a 85 y.o. female who presents for follow-up of Pacemaker Check  .     HPI:    Ms. Childers is a 85 y.o. female with DM, HTN, HLD, syncope s/p PPM here for follow up.    Background:    Long hx of bradycardia.  Had syncope at [employer]: fell over in pew. Felt warm, nauseated.  Since PPM placed 3/2016, no recurrence. 84% A paced.  Holter: SR/SB, 32-79 bpm. Profound SB while sleeping, with junctional escape.  Echo 60% LVEF, 2+ MR.  TSH wnl    My interpretation of today's ECG is APVS    Update (01/11/2019):    Today she says she feels well. She will get a very occasional "chest burn" at rest which causes her to rub her chest. She emphasizes that this is not pain. Her son says that he has not notice this in quite some time. She also has chronic FRANK and her son says she does not move much at all. Ms. Childers denies chest pain with exertion, palpitations, dizziness, or syncope.    Device Interrogation (1/11/2019) reveals an intrinsic SR with stable lead and device function. No arrhythmias or treated episodes were noted.  She paces 78% in the RA and 0% in the RV. Estimated battery longevity 11 years. RA sensitivity decreased in device clinic.    I have personally reviewed the patient's EKG today, which shows ASVS at 60bpm. CA interval is 156. QT is 434.    Recent Cardiac Tests:    2D Echo (10/26/2017):  CONCLUSIONS     1 - Mild left atrial enlargement.     2 - Normal left ventricular systolic function (EF 60-65%).     3 - No wall motion abnormalities.     4 - Normal right ventricular systolic function .     5 - Moderate mitral regurgitation.     6 - The estimated PA systolic pressure is 23 mmHg.     Current Outpatient Medications   Medication Sig    ACCU-CHEK GERMÁN PLUS TEST STRP Strp USE ONE STRIP TO TEST DAILY.    ascorbic acid (VITAMIN C) 1000 MG tablet Take 1,000 mg by mouth once daily.    " "aspirin (ECOTRIN) 81 MG EC tablet Take 81 mg by mouth once daily.    blood sugar diagnostic (ACCU-CHEK GERMÁN PLUS TEST STRP) Strp USE ONE STRIP TO TEST DAILY.    CALCIUM CARBONATE/VITAMIN D3 (VITAMIN D-3 ORAL) Take 500 mg by mouth once daily.    co-enzyme Q-10 30 mg capsule Take 30 mg by mouth once daily.     ezetimibe (ZETIA) 10 mg tablet Take 1 tablet (10 mg total) by mouth once daily.    glucosamine-chondroitin 500-400 mg tablet Take 1 tablet by mouth 3 (three) times daily.    KRILL OIL ORAL Take by mouth once daily.     lisinopril (PRINIVIL,ZESTRIL) 40 MG tablet Take 1 tablet (40 mg total) by mouth once daily.    ONE TOUCH ShelfFlip LANCETS 33 gauge Misc      No current facility-administered medications for this visit.      Review of Systems   Constitution: Negative for malaise/fatigue.   Cardiovascular: Positive for dyspnea on exertion. Negative for chest pain, irregular heartbeat, leg swelling and palpitations.   Respiratory: Negative for shortness of breath.    Hematologic/Lymphatic: Negative for bleeding problem.   Skin: Negative for rash.   Musculoskeletal: Negative for myalgias.   Gastrointestinal: Negative for hematemesis, hematochezia and nausea.   Genitourinary: Negative for hematuria.   Neurological: Negative for light-headedness.   Psychiatric/Behavioral: Negative for altered mental status.   Allergic/Immunologic: Negative for persistent infections.     Objective:        BP (!) 171/90   Pulse 60   Ht 5' 1" (1.549 m)   Wt 69 kg (152 lb 1.9 oz)   BMI 28.74 kg/m²     Physical Exam   Constitutional: She is oriented to person, place, and time. She appears well-developed and well-nourished.   HENT:   Head: Normocephalic.   Nose: Nose normal.   Eyes: Pupils are equal, round, and reactive to light.   Cardiovascular: Normal rate, regular rhythm, S1 normal and S2 normal.   No murmur heard.  Pulses:       Radial pulses are 2+ on the right side, and 2+ on the left side.   Pulmonary/Chest: Breath sounds " normal. No respiratory distress.   Device to LUCW.   Abdominal: Normal appearance.   Musculoskeletal: Normal range of motion. She exhibits no edema.   Neurological: She is alert and oriented to person, place, and time.   Skin: Skin is warm and dry. No erythema.   Psychiatric: She has a normal mood and affect. Her speech is normal and behavior is normal.   Nursing note and vitals reviewed.    Lab Results   Component Value Date     10/02/2018    K 4.3 10/02/2018    BUN 28 (H) 10/02/2018    CREATININE 1.2 10/02/2018    ALT 14 10/02/2018    AST 17 10/02/2018    HGB 10.7 (L) 08/14/2018    HCT 33.7 (L) 08/14/2018    TSH 2.820 05/08/2018    LDLCALC 243.0 (H) 10/02/2018       Recent Labs   Lab 03/14/16  1132   INR 1.1       Assessment:     1. Cardiac pacemaker in situ    2. Essential hypertension      Plan:     In summary, Ms. Childers is a 85 y.o. female with DM, HTN, HLD, syncope s/p PPM here for follow up.  Ms. Childers is doing well from a device perspective with stable lead and device function. No arrhythmia noted. No ventricular pacing.  Her rare chest discomfort at rest is likely GERD or MSK. No exertional CP and it has not occurred in quite some time. I advised her to contact the clinic if it becomes more frequent or exertional.  BP is uncontrolled. Will restart chlorthalidone. Her chronic FRANK is likely due to cardiac deconditioning due to sedentary lifestyle, but will obtain updated echo.    Restart chlorthalidone 25mg daily. Continue other medications.  Blood test in 2 weeks.  Daily walk.  Schedule echo.  Return to clinic in one year, sooner if needed.  Follow up in device clinic as scheduled.   Follow up in EP clinic in 1 year, sooner as needed.     *A copy of this note has been sent to Dr. Del Angel*    Follow-up in about 1 year (around 1/11/2020).    ------------------------------------------------------------------    PREET Nickerson, NP-C  [facility]

## 2019-04-01 RX ORDER — LISINOPRIL 40 MG/1
40 TABLET ORAL DAILY
Qty: 30 TABLET | Refills: 11 | Status: SHIPPED | OUTPATIENT
Start: 2019-04-01 | End: 2020-04-18 | Stop reason: SDUPTHER

## 2019-04-01 RX ORDER — CHLORTHALIDONE 25 MG/1
25 TABLET ORAL DAILY
Qty: 30 TABLET | Refills: 11 | Status: SHIPPED | OUTPATIENT
Start: 2019-04-01 | End: 2020-06-16

## 2019-04-01 NOTE — TELEPHONE ENCOUNTER
----- Message from Yana Gamble sent at 4/1/2019  3:46 PM CDT -----  Contact: pt [phone]  RX request - refill or new RX.  Is this a refill or new RX:  Refill   RX name and strength: lisinopril (PRINIVIL,ZESTRIL) 40 MG tablet, chlorthalidone (HYGROTEN) 25 MG Tab  Directions (copy/paste from chart):    Is this a 30 day or 90 day RX:    Local pharmacy or mail order pharmacy:  Local   Pharmacy name and phone # (copy/paste from chart):   [facility]/pharmacy #[id number] - [place], [place] - [address] [facility] [phone] (Phone)  [phone] (Fax)  Comments:

## 2019-04-16 ENCOUNTER — CLINICAL SUPPORT (OUTPATIENT)
Dept: CARDIOLOGY | Facility: HOSPITAL | Age: 84
End: 2019-04-16
Attending: INTERNAL MEDICINE
Payer: MEDICARE

## 2019-04-16 DIAGNOSIS — Z95.0 CARDIAC PACEMAKER IN SITU: ICD-10-CM

## 2019-04-17 DIAGNOSIS — Z95.0 CARDIAC PACEMAKER IN SITU: Primary | ICD-10-CM

## 2019-06-10 ENCOUNTER — TELEPHONE (OUTPATIENT)
Dept: INTERNAL MEDICINE | Facility: CLINIC | Age: 84
End: 2019-06-10

## 2019-06-10 DIAGNOSIS — D64.9 ANEMIA, UNSPECIFIED TYPE: ICD-10-CM

## 2019-06-10 DIAGNOSIS — Z00.00 ROUTINE GENERAL MEDICAL EXAMINATION AT A HEALTH CARE FACILITY: ICD-10-CM

## 2019-06-10 DIAGNOSIS — N18.30 CKD STAGE 3 DUE TO TYPE 2 DIABETES MELLITUS: ICD-10-CM

## 2019-06-10 DIAGNOSIS — E78.5 HYPERLIPIDEMIA, UNSPECIFIED HYPERLIPIDEMIA TYPE: ICD-10-CM

## 2019-06-10 DIAGNOSIS — E78.00 HYPERCHOLESTEREMIA: ICD-10-CM

## 2019-06-10 DIAGNOSIS — I10 ESSENTIAL HYPERTENSION: ICD-10-CM

## 2019-06-10 DIAGNOSIS — E11.3599 TYPE 2 DIABETES MELLITUS WITH PROLIFERATIVE RETINOPATHY WITHOUT MACULAR EDEMA, WITHOUT LONG-TERM CURRENT USE OF INSULIN, UNSPECIFIED LATERALITY: Primary | ICD-10-CM

## 2019-06-10 DIAGNOSIS — E11.22 CKD STAGE 3 DUE TO TYPE 2 DIABETES MELLITUS: ICD-10-CM

## 2019-06-10 NOTE — TELEPHONE ENCOUNTER
----- Message from An Shi sent at 6/10/2019 10:18 AM CDT -----  Contact: yasmine/maria e/[phone]  Pt saundra called in regards to getting a epp appointment sooner than September. She didn't want to see anyone else.    Please advise

## 2019-06-27 ENCOUNTER — TELEPHONE (OUTPATIENT)
Dept: INTERNAL MEDICINE | Facility: CLINIC | Age: 84
End: 2019-06-27

## 2019-06-27 RX ORDER — LANCETS
EACH MISCELLANEOUS
Qty: 100 EACH | Refills: 3 | Status: SHIPPED | OUTPATIENT
Start: 2019-06-27

## 2019-06-27 NOTE — TELEPHONE ENCOUNTER
----- Message from Nicolette Gonzalez sent at 6/27/2019 10:30 AM CDT -----  Contact: Pts yasmine Zendejas Cell# [phone]  Patient is calling for an RX refill or new RX.  Is this a refill or new RX:  Refill  RX name and strength: ACCU-CHEK GERMÁN PLUS TEST STRP Strp  Directions (copy/paste from chart):    Is this a 30 day or 90 day RX:  30  Local pharmacy or mail order pharmacy:  [facility]/pharmacy #[id number]  [place], [place] - [address] [place] [address]  Pharmacy name and phone # [facility] Phone# [phone],Fax# [phone]   Comment: Patient would like to put in an order for some needles also.

## 2019-07-23 ENCOUNTER — CLINICAL SUPPORT (OUTPATIENT)
Dept: CARDIOLOGY | Facility: HOSPITAL | Age: 84
End: 2019-07-23
Attending: INTERNAL MEDICINE
Payer: MEDICARE

## 2019-07-23 DIAGNOSIS — Z95.0 CARDIAC PACEMAKER IN SITU: ICD-10-CM

## 2019-07-23 PROCEDURE — 93296 REM INTERROG EVL PM/IDS: CPT

## 2019-08-05 ENCOUNTER — TELEPHONE (OUTPATIENT)
Dept: INTERNAL MEDICINE | Facility: CLINIC | Age: 84
End: 2019-08-05

## 2019-08-05 DIAGNOSIS — N39.0 URINARY TRACT INFECTION WITHOUT HEMATURIA, SITE UNSPECIFIED: Primary | ICD-10-CM

## 2019-08-05 NOTE — TELEPHONE ENCOUNTER
Spoke to son and h will pickup cup tomorrow  He was instructed to return specimen to the [facility]

## 2019-08-05 NOTE — TELEPHONE ENCOUNTER
----- Message from Ozzy Burgos sent at 8/5/2019 10:22 AM CDT -----  Contact: Ketan Zendejas   Patient ketan Zendejas called in concerning the patients care he has some questions and would like a call back please advise..

## 2019-08-06 ENCOUNTER — PES CALL (OUTPATIENT)
Dept: ADMINISTRATIVE | Facility: CLINIC | Age: 84
End: 2019-08-06

## 2019-08-08 ENCOUNTER — LAB VISIT (OUTPATIENT)
Dept: LAB | Facility: HOSPITAL | Age: 84
End: 2019-08-08
Attending: INTERNAL MEDICINE
Payer: MEDICARE

## 2019-08-08 DIAGNOSIS — N39.0 URINARY TRACT INFECTION WITHOUT HEMATURIA, SITE UNSPECIFIED: ICD-10-CM

## 2019-08-08 PROCEDURE — 87086 URINE CULTURE/COLONY COUNT: CPT

## 2019-08-08 PROCEDURE — 87077 CULTURE AEROBIC IDENTIFY: CPT

## 2019-08-08 PROCEDURE — 87186 SC STD MICRODIL/AGAR DIL: CPT

## 2019-08-08 PROCEDURE — 87088 URINE BACTERIA CULTURE: CPT

## 2019-08-10 LAB — BACTERIA UR CULT: ABNORMAL

## 2019-08-12 ENCOUNTER — TELEPHONE (OUTPATIENT)
Dept: HOME HEALTH SERVICES | Facility: CLINIC | Age: 84
End: 2019-08-12

## 2019-08-12 ENCOUNTER — TELEPHONE (OUTPATIENT)
Dept: INTERNAL MEDICINE | Facility: CLINIC | Age: 84
End: 2019-08-12

## 2019-08-12 RX ORDER — AMOXICILLIN AND CLAVULANATE POTASSIUM 875; 125 MG/1; MG/1
1 TABLET, FILM COATED ORAL 2 TIMES DAILY
Qty: 14 TABLET | Refills: 0 | Status: SHIPPED | OUTPATIENT
Start: 2019-08-12 | End: 2019-08-19

## 2019-09-11 ENCOUNTER — CLINICAL SUPPORT (OUTPATIENT)
Dept: CARDIOLOGY | Facility: HOSPITAL | Age: 84
End: 2019-09-11
Payer: MEDICARE

## 2019-09-11 DIAGNOSIS — Z95.0 PRESENCE OF CARDIAC PACEMAKER: ICD-10-CM

## 2019-09-30 ENCOUNTER — LAB VISIT (OUTPATIENT)
Dept: LAB | Facility: HOSPITAL | Age: 84
End: 2019-09-30
Attending: INTERNAL MEDICINE
Payer: MEDICARE

## 2019-09-30 DIAGNOSIS — Z00.00 ROUTINE GENERAL MEDICAL EXAMINATION AT A HEALTH CARE FACILITY: ICD-10-CM

## 2019-09-30 DIAGNOSIS — E11.3599 TYPE 2 DIABETES MELLITUS WITH PROLIFERATIVE RETINOPATHY WITHOUT MACULAR EDEMA, WITHOUT LONG-TERM CURRENT USE OF INSULIN, UNSPECIFIED LATERALITY: ICD-10-CM

## 2019-09-30 DIAGNOSIS — D64.9 ANEMIA, UNSPECIFIED TYPE: ICD-10-CM

## 2019-09-30 DIAGNOSIS — E78.5 HYPERLIPIDEMIA, UNSPECIFIED HYPERLIPIDEMIA TYPE: ICD-10-CM

## 2019-09-30 DIAGNOSIS — I10 ESSENTIAL HYPERTENSION: ICD-10-CM

## 2019-09-30 DIAGNOSIS — N18.30 CKD STAGE 3 DUE TO TYPE 2 DIABETES MELLITUS: ICD-10-CM

## 2019-09-30 DIAGNOSIS — E78.00 HYPERCHOLESTEREMIA: ICD-10-CM

## 2019-09-30 DIAGNOSIS — E11.22 CKD STAGE 3 DUE TO TYPE 2 DIABETES MELLITUS: ICD-10-CM

## 2019-09-30 LAB
ALBUMIN SERPL BCP-MCNC: 4.2 G/DL (ref 3.5–5.2)
ALP SERPL-CCNC: 45 U/L (ref 55–135)
ALT SERPL W/O P-5'-P-CCNC: 14 U/L (ref 10–44)
ANION GAP SERPL CALC-SCNC: 10 MMOL/L (ref 8–16)
AST SERPL-CCNC: 16 U/L (ref 10–40)
BASOPHILS # BLD AUTO: 0.02 K/UL (ref 0–0.2)
BASOPHILS NFR BLD: 0.3 % (ref 0–1.9)
BILIRUB SERPL-MCNC: 0.4 MG/DL (ref 0.1–1)
BUN SERPL-MCNC: 30 MG/DL (ref 8–23)
CALCIUM SERPL-MCNC: 10.3 MG/DL (ref 8.7–10.5)
CHLORIDE SERPL-SCNC: 103 MMOL/L (ref 95–110)
CHOLEST SERPL-MCNC: 276 MG/DL (ref 120–199)
CHOLEST/HDLC SERPL: 6 {RATIO} (ref 2–5)
CO2 SERPL-SCNC: 24 MMOL/L (ref 23–29)
CREAT SERPL-MCNC: 1.4 MG/DL (ref 0.5–1.4)
DIFFERENTIAL METHOD: ABNORMAL
EOSINOPHIL # BLD AUTO: 0.3 K/UL (ref 0–0.5)
EOSINOPHIL NFR BLD: 4.4 % (ref 0–8)
ERYTHROCYTE [DISTWIDTH] IN BLOOD BY AUTOMATED COUNT: 13.2 % (ref 11.5–14.5)
EST. GFR  (AFRICAN AMERICAN): 39.2 ML/MIN/1.73 M^2
EST. GFR  (NON AFRICAN AMERICAN): 34 ML/MIN/1.73 M^2
ESTIMATED AVG GLUCOSE: 157 MG/DL (ref 68–131)
GLUCOSE SERPL-MCNC: 215 MG/DL (ref 70–110)
HBA1C MFR BLD HPLC: 7.1 % (ref 4–5.6)
HCT VFR BLD AUTO: 33.7 % (ref 37–48.5)
HDLC SERPL-MCNC: 46 MG/DL (ref 40–75)
HDLC SERPL: 16.7 % (ref 20–50)
HGB BLD-MCNC: 10.8 G/DL (ref 12–16)
IMM GRANULOCYTES # BLD AUTO: 0.01 K/UL (ref 0–0.04)
IMM GRANULOCYTES NFR BLD AUTO: 0.2 % (ref 0–0.5)
LDLC SERPL CALC-MCNC: 204.4 MG/DL (ref 63–159)
LYMPHOCYTES # BLD AUTO: 1.4 K/UL (ref 1–4.8)
LYMPHOCYTES NFR BLD: 21.8 % (ref 18–48)
MCH RBC QN AUTO: 28.1 PG (ref 27–31)
MCHC RBC AUTO-ENTMCNC: 32 G/DL (ref 32–36)
MCV RBC AUTO: 88 FL (ref 82–98)
MONOCYTES # BLD AUTO: 0.5 K/UL (ref 0.3–1)
MONOCYTES NFR BLD: 7.6 % (ref 4–15)
NEUTROPHILS # BLD AUTO: 4.2 K/UL (ref 1.8–7.7)
NEUTROPHILS NFR BLD: 65.7 % (ref 38–73)
NONHDLC SERPL-MCNC: 230 MG/DL
NRBC BLD-RTO: 0 /100 WBC
PLATELET # BLD AUTO: 181 K/UL (ref 150–350)
PMV BLD AUTO: 12.3 FL (ref 9.2–12.9)
POTASSIUM SERPL-SCNC: 4.6 MMOL/L (ref 3.5–5.1)
PROT SERPL-MCNC: 7.6 G/DL (ref 6–8.4)
RBC # BLD AUTO: 3.85 M/UL (ref 4–5.4)
SODIUM SERPL-SCNC: 137 MMOL/L (ref 136–145)
TRIGL SERPL-MCNC: 128 MG/DL (ref 30–150)
TSH SERPL DL<=0.005 MIU/L-ACNC: 2.39 UIU/ML (ref 0.4–4)
WBC # BLD AUTO: 6.33 K/UL (ref 3.9–12.7)

## 2019-09-30 PROCEDURE — 36415 COLL VENOUS BLD VENIPUNCTURE: CPT | Mod: PO

## 2019-09-30 PROCEDURE — 85025 COMPLETE CBC W/AUTO DIFF WBC: CPT

## 2019-09-30 PROCEDURE — 80053 COMPREHEN METABOLIC PANEL: CPT

## 2019-09-30 PROCEDURE — 80061 LIPID PANEL: CPT

## 2019-09-30 PROCEDURE — 83036 HEMOGLOBIN GLYCOSYLATED A1C: CPT

## 2019-09-30 PROCEDURE — 84443 ASSAY THYROID STIM HORMONE: CPT

## 2019-09-30 NOTE — PROGRESS NOTES
Subjective:       Patient ID: Luiza Childers is a 86 y.o. female.    Chief Complaint: Annual Exam    Patient is a 86 y.o.female who presents today for annual    Cholesterol: reviewed  Vaccines: Influenza (declines); Tetanus (declines) Pneumovax (declines); Zoster (declines)  Eye exam: due now; pt declines  Mammogram: declines  Gyn exam: hysterectomy  Colonoscopy: declines  DEXA: declines     Having intermittent bilateral hand pain and back pain at times.   Review of Systems   Constitutional: Negative for appetite change, chills, diaphoresis and fever.   HENT: Negative for congestion, ear discharge, ear pain, postnasal drip, tinnitus, trouble swallowing and voice change.    Eyes: Negative for discharge, redness and itching.   Respiratory: Negative for cough, chest tightness, shortness of breath and wheezing.    Cardiovascular: Negative for chest pain, palpitations and leg swelling.   Gastrointestinal: Negative for abdominal pain, constipation, diarrhea, nausea and vomiting.   Endocrine: Negative for cold intolerance and heat intolerance.   Genitourinary: Negative for difficulty urinating, flank pain, hematuria and urgency.   Musculoskeletal: Negative for arthralgias, gait problem, myalgias and neck stiffness.   Skin: Negative for color change and rash.   Neurological: Negative for dizziness, seizures, syncope and headaches.   Hematological: Negative for adenopathy.   Psychiatric/Behavioral: Negative for agitation, behavioral problems, confusion and sleep disturbance.       Objective:      Physical Exam   Constitutional: She is oriented to person, place, and time. She appears well-developed and well-nourished. No distress.   HENT:   Head: Normocephalic and atraumatic.   Right Ear: External ear normal.   Left Ear: External ear normal.   Nose: Nose normal.   Mouth/Throat: Oropharynx is clear and moist. No oropharyngeal exudate.   Eyes: Pupils are equal, round, and reactive to light. Conjunctivae and EOM are normal.  Right eye exhibits no discharge. Left eye exhibits no discharge. No scleral icterus.   Neck: Neck supple. No JVD present. No tracheal deviation present. No thyromegaly present.   Cardiovascular: Normal rate, normal heart sounds and intact distal pulses. Exam reveals no gallop and no friction rub.   No murmur heard.  Pulmonary/Chest: Effort normal and breath sounds normal. No stridor. No respiratory distress. She has no wheezes. She has no rales. She exhibits no tenderness.   Abdominal: Soft. Bowel sounds are normal. She exhibits no distension. There is no tenderness. There is no rebound.   Musculoskeletal: She exhibits no edema or tenderness.   Lymphadenopathy:     She has no cervical adenopathy.   Neurological: She is alert and oriented to person, place, and time.   Skin: Skin is warm and dry. No rash noted. She is not diaphoretic. No erythema.   Psychiatric: She has a normal mood and affect. Her behavior is normal.   Nursing note and vitals reviewed.      Assessment and Plan:       1. Hypertension associated with diabetes  - labs reviewed; bp is stable    2. Hypercholesteremia  - not controlled; pt intolerant to statins; on zetia    3. 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 Screen for colon cancer  - Fecal Immunochemical Test (iFOBT); Future    5. Arthritis  - trial of voltaren gel    6. CKD stage 3 due to type 2 diabetes mellitus  - stable          No follow-ups on file.

## 2019-10-02 ENCOUNTER — OFFICE VISIT (OUTPATIENT)
Dept: INTERNAL MEDICINE | Facility: CLINIC | Age: 84
End: 2019-10-02
Payer: MEDICARE

## 2019-10-02 VITALS
SYSTOLIC BLOOD PRESSURE: 134 MMHG | WEIGHT: 147.06 LBS | HEIGHT: 62 IN | BODY MASS INDEX: 27.06 KG/M2 | DIASTOLIC BLOOD PRESSURE: 54 MMHG | TEMPERATURE: 99 F | HEART RATE: 60 BPM | RESPIRATION RATE: 18 BRPM

## 2019-10-02 DIAGNOSIS — E11.3599 TYPE 2 DIABETES MELLITUS WITH PROLIFERATIVE RETINOPATHY WITHOUT MACULAR EDEMA, WITHOUT LONG-TERM CURRENT USE OF INSULIN, UNSPECIFIED LATERALITY: ICD-10-CM

## 2019-10-02 DIAGNOSIS — E11.59 HYPERTENSION ASSOCIATED WITH DIABETES: Primary | ICD-10-CM

## 2019-10-02 DIAGNOSIS — E78.00 HYPERCHOLESTEREMIA: Chronic | ICD-10-CM

## 2019-10-02 DIAGNOSIS — Z12.11 SCREEN FOR COLON CANCER: ICD-10-CM

## 2019-10-02 DIAGNOSIS — I15.2 HYPERTENSION ASSOCIATED WITH DIABETES: Primary | ICD-10-CM

## 2019-10-02 DIAGNOSIS — E11.22 CKD STAGE 3 DUE TO TYPE 2 DIABETES MELLITUS: ICD-10-CM

## 2019-10-02 DIAGNOSIS — M19.90 ARTHRITIS: ICD-10-CM

## 2019-10-02 DIAGNOSIS — N18.30 CKD STAGE 3 DUE TO TYPE 2 DIABETES MELLITUS: ICD-10-CM

## 2019-10-02 PROCEDURE — 99214 PR OFFICE/OUTPT VISIT, EST, LEVL IV, 30-39 MIN: ICD-10-PCS | Mod: S$PBB,,, | Performed by: INTERNAL MEDICINE

## 2019-10-02 PROCEDURE — 99214 OFFICE O/P EST MOD 30 MIN: CPT | Mod: S$PBB,,, | Performed by: INTERNAL MEDICINE

## 2019-10-02 PROCEDURE — 99999 PR PBB SHADOW E&M-EST. PATIENT-LVL III: ICD-10-PCS | Mod: PBBFAC,,, | Performed by: INTERNAL MEDICINE

## 2019-10-02 PROCEDURE — 99999 PR PBB SHADOW E&M-EST. PATIENT-LVL III: CPT | Mod: PBBFAC,,, | Performed by: INTERNAL MEDICINE

## 2019-10-02 PROCEDURE — 99213 OFFICE O/P EST LOW 20 MIN: CPT | Mod: PBBFAC,PO | Performed by: INTERNAL MEDICINE

## 2019-10-02 RX ORDER — DICLOFENAC SODIUM 10 MG/G
2 GEL TOPICAL DAILY
Qty: 100 G | Refills: 3 | Status: SHIPPED | OUTPATIENT
Start: 2019-10-02

## 2019-10-02 RX ORDER — ALPHA LIPOIC ACID 300 MG
CAPSULE ORAL
COMMUNITY

## 2019-10-02 RX ORDER — ASCORBIC ACID 1000 MG
TABLET ORAL
COMMUNITY
End: 2020-06-16

## 2019-10-02 RX ORDER — ACETAMINOPHEN 500 MG
5000 TABLET ORAL DAILY
COMMUNITY

## 2019-10-22 ENCOUNTER — CLINICAL SUPPORT (OUTPATIENT)
Dept: CARDIOLOGY | Facility: HOSPITAL | Age: 84
End: 2019-10-22
Attending: INTERNAL MEDICINE
Payer: MEDICARE

## 2019-10-22 DIAGNOSIS — Z95.0 CARDIAC PACEMAKER IN SITU: ICD-10-CM

## 2019-10-22 PROCEDURE — 93296 REM INTERROG EVL PM/IDS: CPT

## 2019-12-10 ENCOUNTER — CLINICAL SUPPORT (OUTPATIENT)
Dept: CARDIOLOGY | Facility: HOSPITAL | Age: 84
End: 2019-12-10
Attending: INTERNAL MEDICINE
Payer: MEDICARE

## 2019-12-10 DIAGNOSIS — Z95.0 CARDIAC PACEMAKER IN SITU: ICD-10-CM

## 2019-12-10 PROCEDURE — 93294 REM INTERROG EVL PM/LDLS PM: CPT | Mod: ,,, | Performed by: INTERNAL MEDICINE

## 2019-12-10 PROCEDURE — 93296 REM INTERROG EVL PM/IDS: CPT | Performed by: INTERNAL MEDICINE

## 2019-12-10 PROCEDURE — 93294 CARDIAC DEVICE CHECK - REMOTE: ICD-10-PCS | Mod: ,,, | Performed by: INTERNAL MEDICINE

## 2019-12-11 ENCOUNTER — TELEPHONE (OUTPATIENT)
Dept: INTERNAL MEDICINE | Facility: CLINIC | Age: 84
End: 2019-12-11

## 2019-12-11 NOTE — TELEPHONE ENCOUNTER
----- Message from Brenda Torres sent at 12/11/2019 11:47 AM CST -----  Contact: [phone]/ son/ Aashish  Patient's son is requesting a call from the office regarding patient's UTI.    Please advise, thank you.

## 2020-02-04 ENCOUNTER — TELEPHONE (OUTPATIENT)
Dept: ELECTROPHYSIOLOGY | Facility: CLINIC | Age: 85
End: 2020-02-04

## 2020-02-04 NOTE — TELEPHONE ENCOUNTER
appts scheduled. Will need donta to book pt w/ JF on 2/26 @ 1pm & will mail reminder   ----- Message from Natacha Holcomb sent at 1/20/2020 12:18 PM CST -----      ----- Message -----  From: James Castellanos RN  Sent: 1/17/2020   7:52 AM CST  To: Natacha Holcomb    Patient is due for her annual provider visit, ekg, and device check.  She has a SJM ppm.  Next available is fine.  Thanks.

## 2020-02-12 ENCOUNTER — TELEPHONE (OUTPATIENT)
Dept: INTERNAL MEDICINE | Facility: CLINIC | Age: 85
End: 2020-02-12

## 2020-02-12 DIAGNOSIS — N18.30 CKD STAGE 3 DUE TO TYPE 2 DIABETES MELLITUS: ICD-10-CM

## 2020-02-12 DIAGNOSIS — E11.3599 TYPE 2 DIABETES MELLITUS WITH PROLIFERATIVE RETINOPATHY WITHOUT MACULAR EDEMA, WITHOUT LONG-TERM CURRENT USE OF INSULIN, UNSPECIFIED LATERALITY: Primary | ICD-10-CM

## 2020-02-12 DIAGNOSIS — E11.22 CKD STAGE 3 DUE TO TYPE 2 DIABETES MELLITUS: ICD-10-CM

## 2020-02-12 DIAGNOSIS — E11.59 HYPERTENSION ASSOCIATED WITH DIABETES: ICD-10-CM

## 2020-02-12 DIAGNOSIS — I15.2 HYPERTENSION ASSOCIATED WITH DIABETES: ICD-10-CM

## 2020-02-12 DIAGNOSIS — E78.00 HYPERCHOLESTEREMIA: ICD-10-CM

## 2020-02-12 NOTE — TELEPHONE ENCOUNTER
----- Message from Dong Ogden sent at 2/12/2020  1:31 PM CST -----  Contact: Pt  Doctor appointment and lab have been scheduled.  Please link lab orders to the lab appointment.  Date of doctor appointment:  4/17/20  Date of lab appointment:  4/11/20  Physical or EP: EPP

## 2020-02-24 ENCOUNTER — PATIENT OUTREACH (OUTPATIENT)
Dept: ADMINISTRATIVE | Facility: OTHER | Age: 85
End: 2020-02-24

## 2020-02-26 ENCOUNTER — TELEPHONE (OUTPATIENT)
Dept: ELECTROPHYSIOLOGY | Facility: CLINIC | Age: 85
End: 2020-02-26

## 2020-02-26 ENCOUNTER — PATIENT MESSAGE (OUTPATIENT)
Dept: ELECTROPHYSIOLOGY | Facility: CLINIC | Age: 85
End: 2020-02-26

## 2020-02-26 DIAGNOSIS — Z95.0 CARDIAC PACEMAKER IN SITU: Primary | Chronic | ICD-10-CM

## 2020-02-26 NOTE — TELEPHONE ENCOUNTER
----- Message from Cathy Danielle MA sent at 2/26/2020  8:51 AM CST -----  Contact:  patient  son call/ Aashish  Please call  Aashish he need to reschedule his mother appointment for today please call [phone]. Thank you.

## 2020-02-26 NOTE — TELEPHONE ENCOUNTER
Spoke with pt's son to schedule echo for pt on 3/2/2020 at 4pm.  Son informed me that he will do his best to get the pt to her appts, but it may be difficulty due to her dementia.

## 2020-03-02 ENCOUNTER — HOSPITAL ENCOUNTER (OUTPATIENT)
Dept: CARDIOLOGY | Facility: CLINIC | Age: 85
Discharge: HOME | End: 2020-03-02
Attending: NURSE PRACTITIONER
Payer: MEDICARE

## 2020-03-02 VITALS
DIASTOLIC BLOOD PRESSURE: 72 MMHG | WEIGHT: 147 LBS | SYSTOLIC BLOOD PRESSURE: 160 MMHG | BODY MASS INDEX: 27.75 KG/M2 | HEIGHT: 61 IN | HEART RATE: 62 BPM

## 2020-03-02 DIAGNOSIS — Z95.0 CARDIAC PACEMAKER IN SITU: ICD-10-CM

## 2020-03-02 LAB
ASCENDING AORTA: 2.9 CM
AV INDEX (PROSTH): 0.65
AV MEAN GRADIENT: 6 MMHG
AV PEAK GRADIENT: 11 MMHG
AV VALVE AREA: 2.08 CM2
AV VELOCITY RATIO: 0.65
BSA FOR ECHO PROCEDURE: 1.69 M2
CV ECHO LV RWT: 0.44 CM
DOP CALC AO PEAK VEL: 1.64 M/S
DOP CALC AO VTI: 34.72 CM
DOP CALC LVOT AREA: 3.2 CM2
DOP CALC LVOT DIAMETER: 2.02 CM
DOP CALC LVOT PEAK VEL: 1.06 M/S
DOP CALC LVOT STROKE VOLUME: 72.26 CM3
DOP CALCLVOT PEAK VEL VTI: 22.56 CM
E WAVE DECELERATION TIME: 306.3 MSEC
E/A RATIO: 0.95
E/E' RATIO: 22.91 M/S
ECHO LV POSTERIOR WALL: 1.07 CM (ref 0.6–1.1)
FRACTIONAL SHORTENING: 47 % (ref 28–44)
INTERVENTRICULAR SEPTUM: 0.97 CM (ref 0.6–1.1)
IVRT: 0.11 MSEC
LA MAJOR: 5.8 CM
LA MINOR: 5.99 CM
LA WIDTH: 4.04 CM
LEFT ATRIUM SIZE: 4.4 CM
LEFT ATRIUM VOLUME INDEX: 53.7 ML/M2
LEFT ATRIUM VOLUME: 89.05 CM3
LEFT INTERNAL DIMENSION IN SYSTOLE: 2.56 CM (ref 2.1–4)
LEFT VENTRICLE DIASTOLIC VOLUME INDEX: 65.58 ML/M2
LEFT VENTRICLE DIASTOLIC VOLUME: 108.7 ML
LEFT VENTRICLE MASS INDEX: 106 G/M2
LEFT VENTRICLE SYSTOLIC VOLUME INDEX: 14.3 ML/M2
LEFT VENTRICLE SYSTOLIC VOLUME: 23.69 ML
LEFT VENTRICULAR INTERNAL DIMENSION IN DIASTOLE: 4.82 CM (ref 3.5–6)
LEFT VENTRICULAR MASS: 176.03 G
LV LATERAL E/E' RATIO: 21 M/S
LV SEPTAL E/E' RATIO: 25.2 M/S
MV PEAK A VEL: 1.33 M/S
MV PEAK E VEL: 1.26 M/S
PULM VEIN S/D RATIO: 0.98
PV PEAK D VEL: 0.51 M/S
PV PEAK S VEL: 0.5 M/S
RA MAJOR: 4.84 CM
RA PRESSURE: 3 MMHG
RA WIDTH: 3.71 CM
RIGHT VENTRICULAR END-DIASTOLIC DIMENSION: 3.52 CM
RV TISSUE DOPPLER FREE WALL SYSTOLIC VELOCITY 1 (APICAL 4 CHAMBER VIEW): 13.94 CM/S
SINUS: 2.67 CM
STJ: 2.31 CM
TDI LATERAL: 0.06 M/S
TDI SEPTAL: 0.05 M/S
TDI: 0.06 M/S
TRICUSPID ANNULAR PLANE SYSTOLIC EXCURSION: 2.45 CM

## 2020-03-02 PROCEDURE — 93306 ECHO (CUPID ONLY): ICD-10-PCS | Mod: 26,S$PBB,, | Performed by: INTERNAL MEDICINE

## 2020-03-02 PROCEDURE — 93306 TTE W/DOPPLER COMPLETE: CPT | Mod: PBBFAC | Performed by: INTERNAL MEDICINE

## 2020-03-03 ENCOUNTER — PATIENT OUTREACH (OUTPATIENT)
Dept: ADMINISTRATIVE | Facility: OTHER | Age: 85
End: 2020-03-03

## 2020-03-04 ENCOUNTER — HOSPITAL ENCOUNTER (OUTPATIENT)
Dept: CARDIOLOGY | Facility: CLINIC | Age: 85
Discharge: HOME | End: 2020-03-04
Payer: MEDICARE

## 2020-03-04 ENCOUNTER — CLINICAL SUPPORT (OUTPATIENT)
Dept: CARDIOLOGY | Facility: HOSPITAL | Age: 85
End: 2020-03-04
Attending: INTERNAL MEDICINE
Payer: MEDICARE

## 2020-03-04 ENCOUNTER — OFFICE VISIT (OUTPATIENT)
Dept: ELECTROPHYSIOLOGY | Facility: CLINIC | Age: 85
End: 2020-03-04
Payer: MEDICARE

## 2020-03-04 VITALS
HEART RATE: 60 BPM | WEIGHT: 142.19 LBS | BODY MASS INDEX: 26.85 KG/M2 | HEIGHT: 61 IN | DIASTOLIC BLOOD PRESSURE: 50 MMHG | SYSTOLIC BLOOD PRESSURE: 126 MMHG

## 2020-03-04 DIAGNOSIS — Z95.0 CARDIAC PACEMAKER IN SITU: ICD-10-CM

## 2020-03-04 DIAGNOSIS — I34.0 MITRAL VALVE INSUFFICIENCY, UNSPECIFIED ETIOLOGY: ICD-10-CM

## 2020-03-04 DIAGNOSIS — E11.59 HYPERTENSION ASSOCIATED WITH DIABETES: ICD-10-CM

## 2020-03-04 DIAGNOSIS — Z95.0 CARDIAC PACEMAKER IN SITU: Primary | Chronic | ICD-10-CM

## 2020-03-04 DIAGNOSIS — I15.2 HYPERTENSION ASSOCIATED WITH DIABETES: ICD-10-CM

## 2020-03-04 PROCEDURE — 99999 PR PBB SHADOW E&M-EST. PATIENT-LVL III: CPT | Mod: PBBFAC,,, | Performed by: NURSE PRACTITIONER

## 2020-03-04 PROCEDURE — 93010 RHYTHM STRIP: ICD-10-PCS | Mod: S$PBB,,, | Performed by: INTERNAL MEDICINE

## 2020-03-04 PROCEDURE — 99214 OFFICE O/P EST MOD 30 MIN: CPT | Mod: S$PBB,,, | Performed by: NURSE PRACTITIONER

## 2020-03-04 PROCEDURE — 99213 OFFICE O/P EST LOW 20 MIN: CPT | Mod: PBBFAC,25 | Performed by: NURSE PRACTITIONER

## 2020-03-04 PROCEDURE — 93005 ELECTROCARDIOGRAM TRACING: CPT | Mod: PBBFAC | Performed by: INTERNAL MEDICINE

## 2020-03-04 PROCEDURE — 93010 ELECTROCARDIOGRAM REPORT: CPT | Mod: S$PBB,,, | Performed by: INTERNAL MEDICINE

## 2020-03-04 PROCEDURE — 93280 PM DEVICE PROGR EVAL DUAL: CPT

## 2020-03-04 PROCEDURE — 99999 PR PBB SHADOW E&M-EST. PATIENT-LVL III: ICD-10-PCS | Mod: PBBFAC,,, | Performed by: NURSE PRACTITIONER

## 2020-03-04 PROCEDURE — 99214 PR OFFICE/OUTPT VISIT, EST, LEVL IV, 30-39 MIN: ICD-10-PCS | Mod: S$PBB,,, | Performed by: NURSE PRACTITIONER

## 2020-03-04 RX ORDER — TITANIUM DIOXIDE, OCTINOXATE, ZINC OXIDE 4.61; 1.6; .78 G/40ML; G/40ML; G/40ML
CREAM TOPICAL
COMMUNITY

## 2020-03-09 ENCOUNTER — CLINICAL SUPPORT (OUTPATIENT)
Dept: CARDIOLOGY | Facility: HOSPITAL | Age: 85
End: 2020-03-09
Attending: INTERNAL MEDICINE
Payer: MEDICARE

## 2020-03-09 DIAGNOSIS — Z95.0 CARDIAC PACEMAKER IN SITU: ICD-10-CM

## 2020-03-09 PROCEDURE — 93294 CARDIAC DEVICE CHECK - REMOTE: ICD-10-PCS | Mod: ,,, | Performed by: INTERNAL MEDICINE

## 2020-03-09 PROCEDURE — 93296 REM INTERROG EVL PM/IDS: CPT | Performed by: INTERNAL MEDICINE

## 2020-03-09 PROCEDURE — 93294 REM INTERROG EVL PM/LDLS PM: CPT | Mod: ,,, | Performed by: INTERNAL MEDICINE

## 2020-03-18 ENCOUNTER — TELEPHONE (OUTPATIENT)
Dept: ELECTROPHYSIOLOGY | Facility: CLINIC | Age: 85
End: 2020-03-18

## 2020-03-18 NOTE — TELEPHONE ENCOUNTER
Spoke with son who will send a manual remote transmission later today.Instructions given as to how to send it. Verbalizes understanding

## 2020-04-09 ENCOUNTER — LAB VISIT (OUTPATIENT)
Dept: LAB | Facility: HOSPITAL | Age: 85
End: 2020-04-09
Payer: MEDICARE

## 2020-04-09 DIAGNOSIS — E11.3599 TYPE 2 DIABETES MELLITUS WITH PROLIFERATIVE RETINOPATHY WITHOUT MACULAR EDEMA, WITHOUT LONG-TERM CURRENT USE OF INSULIN, UNSPECIFIED LATERALITY: ICD-10-CM

## 2020-04-09 DIAGNOSIS — I15.2 HYPERTENSION ASSOCIATED WITH DIABETES: ICD-10-CM

## 2020-04-09 DIAGNOSIS — E11.59 HYPERTENSION ASSOCIATED WITH DIABETES: ICD-10-CM

## 2020-04-09 DIAGNOSIS — E78.00 HYPERCHOLESTEREMIA: ICD-10-CM

## 2020-04-09 DIAGNOSIS — N18.30 CKD STAGE 3 DUE TO TYPE 2 DIABETES MELLITUS: ICD-10-CM

## 2020-04-09 DIAGNOSIS — E11.22 CKD STAGE 3 DUE TO TYPE 2 DIABETES MELLITUS: ICD-10-CM

## 2020-04-09 LAB
ALBUMIN SERPL BCP-MCNC: 4 G/DL (ref 3.5–5.2)
ALP SERPL-CCNC: 55 U/L (ref 55–135)
ALT SERPL W/O P-5'-P-CCNC: 11 U/L (ref 10–44)
ANION GAP SERPL CALC-SCNC: 10 MMOL/L (ref 8–16)
AST SERPL-CCNC: 16 U/L (ref 10–40)
BASOPHILS # BLD AUTO: 0.02 K/UL (ref 0–0.2)
BASOPHILS NFR BLD: 0.3 % (ref 0–1.9)
BILIRUB SERPL-MCNC: 0.3 MG/DL (ref 0.1–1)
BUN SERPL-MCNC: 39 MG/DL (ref 8–23)
CALCIUM SERPL-MCNC: 10.2 MG/DL (ref 8.7–10.5)
CHLORIDE SERPL-SCNC: 102 MMOL/L (ref 95–110)
CHOLEST SERPL-MCNC: 309 MG/DL (ref 120–199)
CHOLEST/HDLC SERPL: 6.4 {RATIO} (ref 2–5)
CO2 SERPL-SCNC: 24 MMOL/L (ref 23–29)
CREAT SERPL-MCNC: 1.2 MG/DL (ref 0.5–1.4)
DIFFERENTIAL METHOD: ABNORMAL
EOSINOPHIL # BLD AUTO: 0.2 K/UL (ref 0–0.5)
EOSINOPHIL NFR BLD: 3.1 % (ref 0–8)
ERYTHROCYTE [DISTWIDTH] IN BLOOD BY AUTOMATED COUNT: 14 % (ref 11.5–14.5)
EST. GFR  (AFRICAN AMERICAN): 47.3 ML/MIN/1.73 M^2
EST. GFR  (NON AFRICAN AMERICAN): 41 ML/MIN/1.73 M^2
ESTIMATED AVG GLUCOSE: 166 MG/DL (ref 68–131)
GLUCOSE SERPL-MCNC: 172 MG/DL (ref 70–110)
HBA1C MFR BLD HPLC: 7.4 % (ref 4–5.6)
HCT VFR BLD AUTO: 36.2 % (ref 37–48.5)
HDLC SERPL-MCNC: 48 MG/DL (ref 40–75)
HDLC SERPL: 15.5 % (ref 20–50)
HGB BLD-MCNC: 11.2 G/DL (ref 12–16)
IMM GRANULOCYTES # BLD AUTO: 0.03 K/UL (ref 0–0.04)
IMM GRANULOCYTES NFR BLD AUTO: 0.4 % (ref 0–0.5)
LDLC SERPL CALC-MCNC: 213 MG/DL (ref 63–159)
LYMPHOCYTES # BLD AUTO: 1.5 K/UL (ref 1–4.8)
LYMPHOCYTES NFR BLD: 19.9 % (ref 18–48)
MCH RBC QN AUTO: 28.5 PG (ref 27–31)
MCHC RBC AUTO-ENTMCNC: 30.9 G/DL (ref 32–36)
MCV RBC AUTO: 92 FL (ref 82–98)
MONOCYTES # BLD AUTO: 0.5 K/UL (ref 0.3–1)
MONOCYTES NFR BLD: 6.4 % (ref 4–15)
NEUTROPHILS # BLD AUTO: 5.3 K/UL (ref 1.8–7.7)
NEUTROPHILS NFR BLD: 69.9 % (ref 38–73)
NONHDLC SERPL-MCNC: 261 MG/DL
NRBC BLD-RTO: 0 /100 WBC
PLATELET # BLD AUTO: 180 K/UL (ref 150–350)
PMV BLD AUTO: 12 FL (ref 9.2–12.9)
POTASSIUM SERPL-SCNC: 4.8 MMOL/L (ref 3.5–5.1)
PROT SERPL-MCNC: 7.7 G/DL (ref 6–8.4)
RBC # BLD AUTO: 3.93 M/UL (ref 4–5.4)
SODIUM SERPL-SCNC: 136 MMOL/L (ref 136–145)
TRIGL SERPL-MCNC: 240 MG/DL (ref 30–150)
TSH SERPL DL<=0.005 MIU/L-ACNC: 1.49 UIU/ML (ref 0.4–4)
WBC # BLD AUTO: 7.63 K/UL (ref 3.9–12.7)

## 2020-04-09 PROCEDURE — 80053 COMPREHEN METABOLIC PANEL: CPT

## 2020-04-09 PROCEDURE — 85025 COMPLETE CBC W/AUTO DIFF WBC: CPT

## 2020-04-09 PROCEDURE — 36415 COLL VENOUS BLD VENIPUNCTURE: CPT | Mod: PO

## 2020-04-09 PROCEDURE — 80061 LIPID PANEL: CPT

## 2020-04-09 PROCEDURE — 83036 HEMOGLOBIN GLYCOSYLATED A1C: CPT

## 2020-04-09 PROCEDURE — 84443 ASSAY THYROID STIM HORMONE: CPT

## 2020-04-17 ENCOUNTER — PATIENT MESSAGE (OUTPATIENT)
Dept: INTERNAL MEDICINE | Facility: CLINIC | Age: 85
End: 2020-04-17

## 2020-04-17 NOTE — PROGRESS NOTES
Subjective:       Patient ID: Luiza Childers is a 86 y.o. female.    Chief Complaint: No chief complaint on file.    Patient is a 86 y.o.female who presents today for follow up    The patient location is: home  The chief complaint leading to consultation is: follow up; review labs  Visit type: audiovisual  Total time spent with patient: 20 min  Each patient to whom he or she provides medical services by telemedicine is:  (1) informed of the relationship between the physician and patient and the respective role of any other health care provider with respect to management of the patient; and (2) notified that he or she may decline to receive medical services by telemedicine and may withdraw from such care at any time.    bs 178 this morning  bp 124/64  Pulse is 60     Notes:   Cholesterol: reviewed  Vaccines: Influenza (declines); Tetanus (declines) Pneumovax (declines); Zoster (declines)  Eye exam:  declines  Mammogram: declines  Gyn exam: hysterectomy  Colonoscopy: declines  DEXA: declines    Pt has not been taking zetia for some reason; must have lost the pills    Son is present during exam today    She has been getting some red spots on her arms.    She does lash out at times and wants to leave.    Son tries to have her eat healthy; difficult at times; she likes to eat fried food once a week. She eats two eggs for breakfast, etc. No cake or candy in the house though  Review of Systems   Constitutional: Negative for appetite change, chills, diaphoresis and fever.   HENT: Negative for congestion, ear discharge, ear pain, postnasal drip, tinnitus, trouble swallowing and voice change.    Eyes: Negative for discharge, redness and itching.   Respiratory: Negative for cough, chest tightness, shortness of breath and wheezing.    Cardiovascular: Negative for chest pain, palpitations and leg swelling.   Gastrointestinal: Negative for abdominal pain, constipation, diarrhea, nausea and vomiting.   Endocrine: Negative for  cold intolerance and heat intolerance.   Genitourinary: Negative for difficulty urinating, flank pain, hematuria and urgency.   Musculoskeletal: Negative for arthralgias, gait problem, myalgias and neck stiffness.   Skin: Negative for color change and rash.   Neurological: Negative for dizziness, seizures, syncope and headaches.   Hematological: Negative for adenopathy.   Psychiatric/Behavioral: Negative for agitation, behavioral problems, confusion and sleep disturbance.       Objective:      Physical Exam   Constitutional: She is oriented to person, place, and time. She appears well-developed and well-nourished. No distress.   HENT:   Head: Normocephalic and atraumatic.   Right Ear: External ear normal.   Left Ear: External ear normal.   Eyes: Pupils are equal, round, and reactive to light. Conjunctivae are normal. Right eye exhibits no discharge. Left eye exhibits no discharge. No scleral icterus.   Neck: Normal range of motion. Neck supple.   Pulmonary/Chest: Effort normal. No stridor.   Neurological: She is alert and oriented to person, place, and time.   Skin: She is not diaphoretic.   Psychiatric: She has a normal mood and affect. Her behavior is normal. Judgment and thought content normal.       Assessment and Plan:       1. 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 Hypercholesteremia  - restart zetia. Check lipid and cmp in 6 months; discussed diet in detail    3. Hypertension associated with diabetes  - controlled on lisinopril    4. CKD stage 3 due to type 2 diabetes mellitus  - stable; avoid nsaids    5. Senile purpura  - trial of vitamin E          No follow-ups on file.

## 2020-04-18 ENCOUNTER — OFFICE VISIT (OUTPATIENT)
Dept: INTERNAL MEDICINE | Facility: CLINIC | Age: 85
End: 2020-04-18
Payer: MEDICARE

## 2020-04-18 ENCOUNTER — TELEPHONE (OUTPATIENT)
Dept: INTERNAL MEDICINE | Facility: CLINIC | Age: 85
End: 2020-04-18

## 2020-04-18 DIAGNOSIS — D69.2 SENILE PURPURA: ICD-10-CM

## 2020-04-18 DIAGNOSIS — E11.22 CKD STAGE 3 DUE TO TYPE 2 DIABETES MELLITUS: ICD-10-CM

## 2020-04-18 DIAGNOSIS — I15.2 HYPERTENSION ASSOCIATED WITH DIABETES: ICD-10-CM

## 2020-04-18 DIAGNOSIS — E11.59 HYPERTENSION ASSOCIATED WITH DIABETES: ICD-10-CM

## 2020-04-18 DIAGNOSIS — E11.3599 TYPE 2 DIABETES MELLITUS WITH PROLIFERATIVE RETINOPATHY WITHOUT MACULAR EDEMA, WITHOUT LONG-TERM CURRENT USE OF INSULIN, UNSPECIFIED LATERALITY: Primary | ICD-10-CM

## 2020-04-18 DIAGNOSIS — N18.30 CKD STAGE 3 DUE TO TYPE 2 DIABETES MELLITUS: ICD-10-CM

## 2020-04-18 DIAGNOSIS — E78.00 HYPERCHOLESTEREMIA: ICD-10-CM

## 2020-04-18 PROCEDURE — 99214 OFFICE O/P EST MOD 30 MIN: CPT | Mod: 95,,, | Performed by: INTERNAL MEDICINE

## 2020-04-18 PROCEDURE — 99214 PR OFFICE/OUTPT VISIT, EST, LEVL IV, 30-39 MIN: ICD-10-PCS | Mod: 95,,, | Performed by: INTERNAL MEDICINE

## 2020-04-18 RX ORDER — LISINOPRIL 40 MG/1
40 TABLET ORAL DAILY
Qty: 30 TABLET | Refills: 11 | Status: SHIPPED | OUTPATIENT
Start: 2020-04-18 | End: 2020-06-16

## 2020-04-18 RX ORDER — LANCETS 33 GAUGE
EACH MISCELLANEOUS
Qty: 300 EACH | Refills: 3 | Status: SHIPPED | OUTPATIENT
Start: 2020-04-18

## 2020-04-18 RX ORDER — EZETIMIBE 10 MG/1
10 TABLET ORAL DAILY
Qty: 30 TABLET | Refills: 6 | Status: SHIPPED | OUTPATIENT
Start: 2020-04-18 | End: 2020-10-15

## 2020-04-18 NOTE — TELEPHONE ENCOUNTER
----- Message from Isis Richter DO sent at 4/18/2020 10:33 AM CDT -----  Check cmp and lipid in 6 months

## 2020-05-07 ENCOUNTER — TELEPHONE (OUTPATIENT)
Dept: CARDIOLOGY | Facility: CLINIC | Age: 85
End: 2020-05-07

## 2020-05-07 NOTE — TELEPHONE ENCOUNTER
Called pt to reschedule consult appointment with . Son states pt unsure at this time if she wants to schedule an appointment. Son with discuss with pt whether or not they wish to reschedule consult visit with .Son will contact our office if pt wishes to reschedule appointment.

## 2020-06-01 ENCOUNTER — TELEPHONE (OUTPATIENT)
Dept: CARDIOLOGY | Facility: CLINIC | Age: 85
End: 2020-06-01

## 2020-06-01 NOTE — TELEPHONE ENCOUNTER
Called pt's son to reschedule recently cancelled appointment with .Son states he is not interested in scheduling an appointment with  for his mother at this time.Son states he will contact her PCP if hse experiences any issues.

## 2020-06-07 ENCOUNTER — CLINICAL SUPPORT (OUTPATIENT)
Dept: CARDIOLOGY | Facility: HOSPITAL | Age: 85
End: 2020-06-07
Attending: INTERNAL MEDICINE
Payer: MEDICARE

## 2020-06-07 DIAGNOSIS — Z95.0 CARDIAC PACEMAKER IN SITU: ICD-10-CM

## 2020-06-07 PROCEDURE — 93294 CARDIAC DEVICE CHECK - REMOTE: ICD-10-PCS | Mod: ,,, | Performed by: INTERNAL MEDICINE

## 2020-06-07 PROCEDURE — 93294 REM INTERROG EVL PM/LDLS PM: CPT | Mod: ,,, | Performed by: INTERNAL MEDICINE

## 2020-06-07 PROCEDURE — 93296 REM INTERROG EVL PM/IDS: CPT | Performed by: INTERNAL MEDICINE

## 2020-06-10 ENCOUNTER — NURSE TRIAGE (OUTPATIENT)
Dept: ADMINISTRATIVE | Facility: CLINIC | Age: 85
End: 2020-06-10

## 2020-06-10 NOTE — TELEPHONE ENCOUNTER
"Pt's son calling to report that  this am her BP is 109/39, HR 62.  He states she feels fine,and they held the lisinopril this am.  Reports that on Monday there was an episode of weakness and hypotension, and again yesterday she had another episode where she got weak, and almost passed out, BP was 74/35, they had her lie down with her feet elevated, recovered in approximately 15 minutes.    She is on lisinopril 40 mg daily.    Son also reports she's been trouble pronouncing her words, for the last 2-3 weeks intermittent in nature, worsening in nature.    Offered appt today at alternate location, but they will wait until an appt is available tomorrow closer to where they live.    Reason for Disposition   Systolic BP  while taking blood pressure medications and NOT dizzy, lightheaded or weak    Additional Information   Negative: Systolic BP < 90 and feeling dizzy, lightheaded, or weak   Negative: Started suddenly after an allergic medicine, an allergic food, or bee sting   Negative: Shock suspected (e.g., cold/pale/clammy skin, too weak to stand, low BP, rapid pulse)   Negative: Difficult to awaken or acting confused  (e.g., disoriented, slurred speech)   Negative: Fainted   Negative: Chest pain   Negative: Bleeding (e.g., vomiting blood, rectal bleeding or tarry stools, severe vaginal bleeding)   Negative: Extra heart beats or heart is beating fast  (i.e., "palpitations")   Negative: Sounds like a life-threatening emergency to the triager   Negative: Systolic BP < 80 and NOT dizzy, lightheaded or weak (feels normal)   Negative: Abdominal pain   Negative: Major surgery in the past month   Negative: Fever > 100.5 F (38.1 C)   Negative: Drinking very little and has signs of dehydration (e.g., no urine > 12 hours, very dry mouth, very lightheaded)   Negative: Fall in systolic BP > 20 mm Hg from normal and feeling dizzy, lightheaded, or weak   Negative: Patient sounds very sick or weak to the " triager   Negative: Systolic BP < 90 and NOT dizzy, lightheaded or weak    Protocols used: LOW BLOOD PRESSURE-A-OH

## 2020-06-11 ENCOUNTER — OFFICE VISIT (OUTPATIENT)
Dept: PRIMARY CARE CLINIC | Facility: CLINIC | Age: 85
End: 2020-06-11
Payer: MEDICARE

## 2020-06-11 VITALS
TEMPERATURE: 98 F | HEART RATE: 60 BPM | HEIGHT: 61 IN | WEIGHT: 140.88 LBS | OXYGEN SATURATION: 98 % | SYSTOLIC BLOOD PRESSURE: 112 MMHG | BODY MASS INDEX: 26.6 KG/M2 | DIASTOLIC BLOOD PRESSURE: 44 MMHG

## 2020-06-11 DIAGNOSIS — E11.22 CKD STAGE 3 DUE TO TYPE 2 DIABETES MELLITUS: ICD-10-CM

## 2020-06-11 DIAGNOSIS — I65.23 BILATERAL CAROTID ARTERY STENOSIS: Chronic | ICD-10-CM

## 2020-06-11 DIAGNOSIS — R03.1 LOW BLOOD PRESSURE READING: Primary | ICD-10-CM

## 2020-06-11 DIAGNOSIS — Z95.0 CARDIAC PACEMAKER IN SITU: Chronic | ICD-10-CM

## 2020-06-11 DIAGNOSIS — N18.30 CKD STAGE 3 DUE TO TYPE 2 DIABETES MELLITUS: ICD-10-CM

## 2020-06-11 DIAGNOSIS — R53.1 WEAKNESS: ICD-10-CM

## 2020-06-11 PROCEDURE — 99999 PR PBB SHADOW E&M-EST. PATIENT-LVL III: CPT | Mod: PBBFAC,,, | Performed by: FAMILY MEDICINE

## 2020-06-11 PROCEDURE — 99999 PR PBB SHADOW E&M-EST. PATIENT-LVL III: ICD-10-PCS | Mod: PBBFAC,,, | Performed by: FAMILY MEDICINE

## 2020-06-11 PROCEDURE — 99214 PR OFFICE/OUTPT VISIT, EST, LEVL IV, 30-39 MIN: ICD-10-PCS | Mod: S$PBB,,, | Performed by: FAMILY MEDICINE

## 2020-06-11 PROCEDURE — 99213 OFFICE O/P EST LOW 20 MIN: CPT | Mod: PBBFAC,PN | Performed by: FAMILY MEDICINE

## 2020-06-11 PROCEDURE — 99214 OFFICE O/P EST MOD 30 MIN: CPT | Mod: S$PBB,,, | Performed by: FAMILY MEDICINE

## 2020-06-11 NOTE — PROGRESS NOTES
Subjective:      Patient ID: Luiza Childers is a 87 y.o. female.    Chief Complaint: hypotension, mild slurred speech    88 yo,  past medical history of bilateral carotid artery stenosis, pacemaker, diabetes, CKD 3, hypertension, hyperlipidemia    She presents today with her son who is a home health physical therapist.    We reviewed her chart together-her son called her doctor yesterday with blood pressure 70 for over 35, was weak.  She has been taking lisinopril 40.  Trouble pronouncing her words,  in the past 2-3 weeks.      This morning when she was getting ready for her doctor's visit she gets worked up.  In the last few weeks she has been sometimes week, sometimes with difficulty saying her words in the evenings about 5-6 p.m..  She is aware and alert, she knows what she wants to say but can not fully get it out.  As a physical therapist, he has noted his mom's blood pressure has been on the low side 106/40, 125/49.  Yesterday and today he did not give her lisinopril due to these low numbers.  She has no loss of consciousness, no falls. No recnet upper respiratory complaints, no fever.  No weakness on 1 side of the body, no history of stroke or heart attack.  He is asking if adding salt in her diet could help her to feel better.    She follows with Dr. Del Angel for pacemaker.  He recommended she see Cardiology, but it is very taxing to get her out of the home and she would rather her PCP follow her for blood pressure changes.  She does not want to see another cardiologist.      She did see her PCP Dr Richter 4/18 & she restarted Zetia      Current Outpatient Medications:     alpha lipoic acid 300 mg Cap, Take by mouth., Disp: , Rfl:     ascorbic acid (VITAMIN C) 1000 MG tablet, Take 1,000 mg by mouth once daily., Disp: , Rfl:     aspirin (ECOTRIN) 81 MG EC tablet, Take 81 mg by mouth every other day. , Disp: , Rfl:     blood sugar diagnostic (ACCU-CHEK GERMÁN PLUS TEST STRP) Strp, USE ONE STRIP TO TEST  DAILY., Disp: 100 strip, Rfl: 11    blood sugar diagnostic (ACCU-CHEK GERMÁN PLUS TEST STRP) Strp, USE ONE STRIP TO TEST DAILY., Disp: 300 strip, Rfl: 1    CALCIUM CARBONATE/VITAMIN D3 (VITAMIN D-3 ORAL), Take 500 mg by mouth once daily., Disp: , Rfl:     chlorthalidone (HYGROTEN) 25 MG Tab, Take 1 tablet (25 mg total) by mouth once daily., Disp: 30 tablet, Rfl: 11    cholecalciferol, vitamin D3, (VITAMIN D3) 5,000 unit Tab, Take 10,000 Units by mouth once daily., Disp: , Rfl:     co-enzyme Q-10 30 mg capsule, Take 30 mg by mouth once daily. , Disp: , Rfl:     cranberry 400 mg Cap, Take by mouth., Disp: , Rfl:     diclofenac sodium (VOLTAREN) 1 % Gel, Apply 2 g topically once daily., Disp: 100 g, Rfl: 3    ezetimibe (ZETIA) 10 mg tablet, Take 1 tablet (10 mg total) by mouth once daily., Disp: 30 tablet, Rfl: 6    ginkgo biloba 40 mg Tab, Take by mouth., Disp: , Rfl:     glucosamine-chondroitin 500-400 mg tablet, Take 1 tablet by mouth 3 (three) times daily., Disp: , Rfl:     KRILL OIL ORAL, Take by mouth once daily. , Disp: , Rfl:     lancets (ONETOUCH DELICA LANCETS) 33 gauge Misc, Check fasting glucose daily, Disp: 300 each, Rfl: 3    lancets Misc, Check glucose daily, Disp: 100 each, Rfl: 3    lisinopriL (PRINIVIL,ZESTRIL) 40 MG tablet, Take 1 tablet (40 mg total) by mouth once daily., Disp: 30 tablet, Rfl: 11    TURMERIC ORAL, Take by mouth., Disp: , Rfl:     vit A/vit C/vit E/zinc/copper (OCUVITE PRESERVISION ORAL), Take by mouth., Disp: , Rfl:     Lab Results   Component Value Date    HGBA1C 7.4 (H) 04/09/2020    HGBA1C 7.1 (H) 09/30/2019    HGBA1C 6.8 (H) 08/14/2018     Lab Results   Component Value Date    MICALBCREAT 75.7 (H) 05/08/2018     Lab Results   Component Value Date    LDLCALC 213.0 (H) 04/09/2020    LDLCALC 204.4 (H) 09/30/2019    CHOL 309 (H) 04/09/2020    HDL 48 04/09/2020    TRIG 240 (H) 04/09/2020       Lab Results   Component Value Date     04/09/2020    K 4.8 04/09/2020  "    04/09/2020    CO2 24 04/09/2020     (H) 04/09/2020    BUN 39 (H) 04/09/2020    CREATININE 1.2 04/09/2020    CALCIUM 10.2 04/09/2020    PROT 7.7 04/09/2020    ALBUMIN 4.0 04/09/2020    BILITOT 0.3 04/09/2020    ALKPHOS 55 04/09/2020    AST 16 04/09/2020    ALT 11 04/09/2020    ANIONGAP 10 04/09/2020    ESTGFRAFRICA 47.3 (A) 04/09/2020    EGFRNONAA 41.0 (A) 04/09/2020    WBC 7.63 04/09/2020    HGB 11.2 (L) 04/09/2020    HGB 10.8 (L) 09/30/2019    HCT 36.2 (L) 04/09/2020    MCV 92 04/09/2020     04/09/2020    TSH 1.493 04/09/2020           Past Medical History:   Diagnosis Date    Diabetes mellitus, type 2     Hypertension     Syncope and collapse      Past Surgical History:   Procedure Laterality Date    CARDIAC PACEMAKER PLACEMENT      CATARACT EXTRACTION      HYSTERECTOMY       Social History     Social History Narrative    2 kids, son local, dtr in [place].    .    Lives alone, she drives, memory OK.     Family History   Family history unknown: Yes     Vitals:    06/11/20 1005   BP: (!) 112/44   Pulse: 60   Temp: 98 °F (36.7 °C)   SpO2: 98%   Weight: 63.9 kg (140 lb 14 oz)   Height: 5' 1" (1.549 m)     Objective:   Physical Exam   Constitutional: She appears well-developed and well-nourished. No distress.   wheelchair   HENT:   Right Ear: Tympanic membrane and external ear normal.   Left Ear: Tympanic membrane and external ear normal.   Mouth/Throat: Mucous membranes are normal. No oropharyngeal exudate or posterior oropharyngeal erythema.   Eyes: Pupils are equal, round, and reactive to light. EOM are normal.   Neck: Normal range of motion. Neck supple. No thyromegaly present.   Cardiovascular: Normal rate, regular rhythm and normal heart sounds.   No murmur heard.  Pulmonary/Chest: Effort normal and breath sounds normal. No respiratory distress. She has no wheezes. She has no rales.   Lymphadenopathy:     She has no cervical adenopathy.   Neurological: She is alert.   Smiling, " conversant    Cranial nerves III through XII grossly intact, neck is supple, Nontender Cervical spine,  4/5 bilateral hand      Skin: Skin is warm and dry.   Psychiatric: She has a normal mood and affect. Her behavior is normal. Judgment and thought content normal.   Nursing note and vitals reviewed.    Assessment:     1. Low blood pressure reading    2. Weakness    3. Cardiac pacemaker in situ    4. Bilateral carotid artery stenosis    5. CKD stage 3 due to type 2 diabetes mellitus      Plan:        Upon speaking at length with patient and her son who is a home health physical therapist, we are of the same understanding that lisinopril 40 mg may be dropping her blood pressure too much causing a decrease in sensorium at the end of the day, causing weakness and fatigue.    Continue other medications    Make sure she's drinking water during the day & urine is not dark.     Do not add extra salt to her diet to elevate BP, as this could aggravate her other medical conditions.     Follow with Dr Del Angel for pacemaker    Since her PCP is out of the office, please email me with home blood pressure readings in 1 week off of lisinopril 40 mg.  Also with how she is feeling.  She may overall feel better if her blood pressure is on the higher side.  I am concerned with blood pressures of systolic 104, as she is frail and could fall in the home.   There are no Patient Instructions on file for this visit.

## 2020-06-15 ENCOUNTER — TELEPHONE (OUTPATIENT)
Dept: CARDIOLOGY | Facility: CLINIC | Age: 85
End: 2020-06-15

## 2020-06-15 DIAGNOSIS — R00.2 PALPITATIONS: Primary | ICD-10-CM

## 2020-06-16 ENCOUNTER — HOSPITAL ENCOUNTER (INPATIENT)
Facility: HOSPITAL | Age: 85
LOS: 2 days | Discharge: HOME | DRG: 378 | End: 2020-06-18
Attending: EMERGENCY MEDICINE | Admitting: HOSPITALIST
Payer: MEDICARE

## 2020-06-16 ENCOUNTER — HOSPITAL ENCOUNTER (OUTPATIENT)
Dept: CARDIOLOGY | Facility: CLINIC | Age: 85
Discharge: HOME | DRG: 378 | End: 2020-06-16
Payer: MEDICARE

## 2020-06-16 ENCOUNTER — OFFICE VISIT (OUTPATIENT)
Dept: CARDIOLOGY | Facility: CLINIC | Age: 85
DRG: 378 | End: 2020-06-16
Payer: MEDICARE

## 2020-06-16 VITALS
HEIGHT: 61 IN | SYSTOLIC BLOOD PRESSURE: 123 MMHG | WEIGHT: 140.19 LBS | HEART RATE: 60 BPM | DIASTOLIC BLOOD PRESSURE: 60 MMHG | BODY MASS INDEX: 26.47 KG/M2

## 2020-06-16 DIAGNOSIS — D64.9 SYMPTOMATIC ANEMIA: Primary | ICD-10-CM

## 2020-06-16 DIAGNOSIS — R06.09 DOE (DYSPNEA ON EXERTION): Primary | ICD-10-CM

## 2020-06-16 DIAGNOSIS — R00.2 PALPITATIONS: ICD-10-CM

## 2020-06-16 PROBLEM — D62 ACUTE BLOOD LOSS ANEMIA: Status: ACTIVE | Noted: 2020-06-16

## 2020-06-16 PROBLEM — K92.2 GI HEMORRHAGE: Status: ACTIVE | Noted: 2020-06-16

## 2020-06-16 PROBLEM — G31.84 MILD COGNITIVE IMPAIRMENT: Status: ACTIVE | Noted: 2020-06-16

## 2020-06-16 LAB
ABO + RH BLD: NORMAL
BASOPHILS # BLD AUTO: 0.01 K/UL (ref 0–0.2)
BASOPHILS NFR BLD: 0.1 % (ref 0–1.9)
BLD GP AB SCN CELLS X3 SERPL QL: NORMAL
BLD PROD TYP BPU: NORMAL
BLD PROD TYP BPU: NORMAL
BLOOD UNIT EXPIRATION DATE: NORMAL
BLOOD UNIT EXPIRATION DATE: NORMAL
BLOOD UNIT TYPE CODE: 6200
BLOOD UNIT TYPE CODE: 6200
BLOOD UNIT TYPE: NORMAL
BLOOD UNIT TYPE: NORMAL
CODING SYSTEM: NORMAL
CODING SYSTEM: NORMAL
DIFFERENTIAL METHOD: ABNORMAL
DISPENSE STATUS: NORMAL
DISPENSE STATUS: NORMAL
EOSINOPHIL # BLD AUTO: 0.2 K/UL (ref 0–0.5)
EOSINOPHIL NFR BLD: 2.1 % (ref 0–8)
ERYTHROCYTE [DISTWIDTH] IN BLOOD BY AUTOMATED COUNT: 15.8 % (ref 11.5–14.5)
ESTIMATED AVG GLUCOSE: 120 MG/DL (ref 68–131)
FERRITIN SERPL-MCNC: 20 NG/ML (ref 20–300)
HAPTOGLOB SERPL-MCNC: 197 MG/DL (ref 30–250)
HBA1C MFR BLD HPLC: 5.8 % (ref 4–5.6)
HCT VFR BLD AUTO: 20.2 % (ref 37–48.5)
HGB BLD-MCNC: 6.1 G/DL (ref 12–16)
IMM GRANULOCYTES # BLD AUTO: 0.03 K/UL (ref 0–0.04)
IMM GRANULOCYTES NFR BLD AUTO: 0.4 % (ref 0–0.5)
IRON SERPL-MCNC: 18 UG/DL (ref 30–160)
LDH SERPL L TO P-CCNC: 223 U/L (ref 110–260)
LYMPHOCYTES # BLD AUTO: 1.3 K/UL (ref 1–4.8)
LYMPHOCYTES NFR BLD: 17.4 % (ref 18–48)
MCH RBC QN AUTO: 28.5 PG (ref 27–31)
MCHC RBC AUTO-ENTMCNC: 30.2 G/DL (ref 32–36)
MCV RBC AUTO: 94 FL (ref 82–98)
MONOCYTES # BLD AUTO: 0.4 K/UL (ref 0.3–1)
MONOCYTES NFR BLD: 5.9 % (ref 4–15)
NEUTROPHILS # BLD AUTO: 5.5 K/UL (ref 1.8–7.7)
NEUTROPHILS NFR BLD: 74.1 % (ref 38–73)
NRBC BLD-RTO: 0 /100 WBC
PLATELET # BLD AUTO: 251 K/UL (ref 150–350)
PMV BLD AUTO: 11.3 FL (ref 9.2–12.9)
RBC # BLD AUTO: 2.14 M/UL (ref 4–5.4)
RETICS/RBC NFR AUTO: 6.9 % (ref 0.5–2.5)
SARS-COV-2 RDRP RESP QL NAA+PROBE: NEGATIVE
SATURATED IRON: 4 % (ref 20–50)
TOTAL IRON BINDING CAPACITY: 432 UG/DL (ref 250–450)
TRANS ERYTHROCYTES VOL PATIENT: NORMAL ML
TRANS ERYTHROCYTES VOL PATIENT: NORMAL ML
TRANSFERRIN SERPL-MCNC: 292 MG/DL (ref 200–375)
VIT B12 SERPL-MCNC: 419 PG/ML (ref 210–950)
WBC # BLD AUTO: 7.48 K/UL (ref 3.9–12.7)

## 2020-06-16 PROCEDURE — 93005 ELECTROCARDIOGRAM TRACING: CPT | Mod: PBBFAC | Performed by: INTERNAL MEDICINE

## 2020-06-16 PROCEDURE — 86850 RBC ANTIBODY SCREEN: CPT

## 2020-06-16 PROCEDURE — 99291 PR CRITICAL CARE, E/M 30-74 MINUTES: ICD-10-PCS | Mod: ,,, | Performed by: PHYSICIAN ASSISTANT

## 2020-06-16 PROCEDURE — 83036 HEMOGLOBIN GLYCOSYLATED A1C: CPT

## 2020-06-16 PROCEDURE — U0002 COVID-19 LAB TEST NON-CDC: HCPCS

## 2020-06-16 PROCEDURE — 99999 PR PBB SHADOW E&M-EST. PATIENT-LVL V: CPT | Mod: PBBFAC,GC,, | Performed by: INTERNAL MEDICINE

## 2020-06-16 PROCEDURE — C9113 INJ PANTOPRAZOLE SODIUM, VIA: HCPCS | Performed by: HOSPITALIST

## 2020-06-16 PROCEDURE — 82607 VITAMIN B-12: CPT

## 2020-06-16 PROCEDURE — 99204 OFFICE O/P NEW MOD 45 MIN: CPT | Mod: S$PBB,25,GC, | Performed by: INTERNAL MEDICINE

## 2020-06-16 PROCEDURE — 12000002 HC ACUTE/MED SURGE SEMI-PRIVATE ROOM

## 2020-06-16 PROCEDURE — 99223 PR INITIAL HOSPITAL CARE,LEVL III: ICD-10-PCS | Mod: AI,,, | Performed by: HOSPITALIST

## 2020-06-16 PROCEDURE — 99215 OFFICE O/P EST HI 40 MIN: CPT | Mod: PBBFAC,25 | Performed by: INTERNAL MEDICINE

## 2020-06-16 PROCEDURE — 99999 PR PBB SHADOW E&M-EST. PATIENT-LVL V: ICD-10-PCS | Mod: PBBFAC,GC,, | Performed by: INTERNAL MEDICINE

## 2020-06-16 PROCEDURE — 63600175 PHARM REV CODE 636 W HCPCS: Performed by: HOSPITALIST

## 2020-06-16 PROCEDURE — 86920 COMPATIBILITY TEST SPIN: CPT

## 2020-06-16 PROCEDURE — 82728 ASSAY OF FERRITIN: CPT

## 2020-06-16 PROCEDURE — 93010 EKG 12-LEAD: ICD-10-PCS | Mod: S$PBB,,, | Performed by: INTERNAL MEDICINE

## 2020-06-16 PROCEDURE — 99204 PR OFFICE/OUTPT VISIT, NEW, LEVL IV, 45-59 MIN: ICD-10-PCS | Mod: S$PBB,25,GC, | Performed by: INTERNAL MEDICINE

## 2020-06-16 PROCEDURE — 85045 AUTOMATED RETICULOCYTE COUNT: CPT

## 2020-06-16 PROCEDURE — 99223 1ST HOSP IP/OBS HIGH 75: CPT | Mod: AI,,, | Performed by: HOSPITALIST

## 2020-06-16 PROCEDURE — 99291 CRITICAL CARE FIRST HOUR: CPT | Mod: ,,, | Performed by: PHYSICIAN ASSISTANT

## 2020-06-16 PROCEDURE — 83540 ASSAY OF IRON: CPT

## 2020-06-16 PROCEDURE — 83010 ASSAY OF HAPTOGLOBIN QUANT: CPT

## 2020-06-16 PROCEDURE — 93010 ELECTROCARDIOGRAM REPORT: CPT | Mod: S$PBB,,, | Performed by: INTERNAL MEDICINE

## 2020-06-16 PROCEDURE — P9021 RED BLOOD CELLS UNIT: HCPCS

## 2020-06-16 PROCEDURE — 99291 CRITICAL CARE FIRST HOUR: CPT

## 2020-06-16 PROCEDURE — 85025 COMPLETE CBC W/AUTO DIFF WBC: CPT

## 2020-06-16 PROCEDURE — 83615 LACTATE (LD) (LDH) ENZYME: CPT

## 2020-06-16 PROCEDURE — 36430 TRANSFUSION BLD/BLD COMPNT: CPT

## 2020-06-16 RX ORDER — SODIUM CHLORIDE 0.9 % (FLUSH) 0.9 %
5 SYRINGE (ML) INJECTION
Status: DISCONTINUED | OUTPATIENT
Start: 2020-06-16 | End: 2020-06-18 | Stop reason: HOSPADM

## 2020-06-16 RX ORDER — GLUCAGON 1 MG
1 KIT INJECTION
Status: DISCONTINUED | OUTPATIENT
Start: 2020-06-16 | End: 2020-06-18 | Stop reason: HOSPADM

## 2020-06-16 RX ORDER — AMOXICILLIN 250 MG
1 CAPSULE ORAL 2 TIMES DAILY PRN
Status: DISCONTINUED | OUTPATIENT
Start: 2020-06-16 | End: 2020-06-18 | Stop reason: HOSPADM

## 2020-06-16 RX ORDER — INSULIN ASPART 100 [IU]/ML
0-5 INJECTION, SOLUTION INTRAVENOUS; SUBCUTANEOUS
Status: DISCONTINUED | OUTPATIENT
Start: 2020-06-16 | End: 2020-06-18 | Stop reason: HOSPADM

## 2020-06-16 RX ORDER — ONDANSETRON 2 MG/ML
8 INJECTION INTRAMUSCULAR; INTRAVENOUS EVERY 8 HOURS PRN
Status: DISCONTINUED | OUTPATIENT
Start: 2020-06-16 | End: 2020-06-18 | Stop reason: HOSPADM

## 2020-06-16 RX ORDER — ASPIRIN 81 MG/1
81 TABLET ORAL DAILY
COMMUNITY
End: 2020-07-28

## 2020-06-16 RX ORDER — IBUPROFEN 200 MG
24 TABLET ORAL
Status: DISCONTINUED | OUTPATIENT
Start: 2020-06-16 | End: 2020-06-18 | Stop reason: HOSPADM

## 2020-06-16 RX ORDER — HYDROCODONE BITARTRATE AND ACETAMINOPHEN 5; 325 MG/1; MG/1
1 TABLET ORAL EVERY 4 HOURS PRN
Status: DISCONTINUED | OUTPATIENT
Start: 2020-06-16 | End: 2020-06-18 | Stop reason: HOSPADM

## 2020-06-16 RX ORDER — IBUPROFEN 200 MG
16 TABLET ORAL
Status: DISCONTINUED | OUTPATIENT
Start: 2020-06-16 | End: 2020-06-18 | Stop reason: HOSPADM

## 2020-06-16 RX ORDER — ACETAMINOPHEN 325 MG/1
650 TABLET ORAL EVERY 4 HOURS PRN
Status: DISCONTINUED | OUTPATIENT
Start: 2020-06-16 | End: 2020-06-18 | Stop reason: HOSPADM

## 2020-06-16 RX ORDER — HYDROCODONE BITARTRATE AND ACETAMINOPHEN 10; 325 MG/1; MG/1
1 TABLET ORAL EVERY 4 HOURS PRN
Status: DISCONTINUED | OUTPATIENT
Start: 2020-06-16 | End: 2020-06-18 | Stop reason: HOSPADM

## 2020-06-16 RX ORDER — TALC
6 POWDER (GRAM) TOPICAL NIGHTLY PRN
Status: DISCONTINUED | OUTPATIENT
Start: 2020-06-16 | End: 2020-06-18 | Stop reason: HOSPADM

## 2020-06-16 RX ORDER — HYDROCODONE BITARTRATE AND ACETAMINOPHEN 500; 5 MG/1; MG/1
TABLET ORAL
Status: DISCONTINUED | OUTPATIENT
Start: 2020-06-16 | End: 2020-06-18 | Stop reason: HOSPADM

## 2020-06-16 RX ORDER — PANTOPRAZOLE SODIUM 40 MG/10ML
40 INJECTION, POWDER, LYOPHILIZED, FOR SOLUTION INTRAVENOUS 2 TIMES DAILY
Status: DISCONTINUED | OUTPATIENT
Start: 2020-06-16 | End: 2020-06-18 | Stop reason: HOSPADM

## 2020-06-16 RX ADMIN — PANTOPRAZOLE SODIUM 40 MG: 40 INJECTION, POWDER, LYOPHILIZED, FOR SOLUTION INTRAVENOUS at 10:06

## 2020-06-16 NOTE — PROGRESS NOTES
Patient MRN: [id number]  Patient : 1933  PCP: Isis Richter DO    CC:   Chief Complaint   Patient presents with    Hypertension    Mitral Regurgitation        History of Present Illness:   Luiza Childers is a 87 y.o. y.o. female who presents for consultation for hypotension.     This is a new patient for me presenting with a new problem of hypotension.    Mrs. Childers presents for evaluation for hypotension.  Accompanied by her son.  She arrives via wheelchair.  Previous on chlorthalidone which was discontinued a few months ago.  She was evaluated by her PCP on  blood pressure at that time was 112/44.  She was advised to hold her lisinopril 40 mg at that time.  Presents today off of lisinopril.  Blood pressure is 123/60.  She lives at home with her son.  No episodes of syncope.  She has not complained of lightheadedness.  She is complaining of increasing dyspnea on exertion with minimal activities including activities of daily living.  Denies chest pain.  No history of coronary artery disease or stents.  No prior stress testing.  Blood sugars have been elevated at home.  Per her son no episodes of hypoglycemia.  She notes decreased appetite.  Denies fever, chills, nausea, vomiting, dysuria, cough.    She has a permanent pacemaker, followed by Dr. Del Angel.  She has diabetes with diabetic polyneuropathy.  She has hyperlipidemia treated with Zetia      This is the extent of the patient's complaints at this time. Patient queried and denies any further complaint.     Past Medical History:     Past Medical History:   Diagnosis Date    Diabetes mellitus, type 2     Hypertension     Syncope and collapse        Past Surgical History:     Past Surgical History:   Procedure Laterality Date    CARDIAC PACEMAKER PLACEMENT      CATARACT EXTRACTION      HYSTERECTOMY         Social History:     Social History     Tobacco Use    Smoking status: Former Smoker     Years: 25.00     Quit date: 1974      Years since quittin.0    Smokeless tobacco: Never Used   Substance Use Topics    Alcohol use: No     Alcohol/week: 0.0 standard drinks     Frequency: Monthly or less     Drinks per session: 1 or 2     Binge frequency: Never       Family History:     Family History   Family history unknown: Yes       Allergies:   Review of patient's allergies indicates:  No Known Allergies    Review of Systems:   ROS    All other systems reviewed and are negative.   Medications:     Current Outpatient Medications on File Prior to Visit   Medication Sig Dispense Refill    alpha lipoic acid 300 mg Cap Take by mouth.      aspirin (ECOTRIN) 81 MG EC tablet Take 81 mg by mouth once daily.      co-enzyme Q-10 30 mg capsule Take 30 mg by mouth once daily.       cranberry 400 mg Cap Take by mouth.      ezetimibe (ZETIA) 10 mg tablet Take 1 tablet (10 mg total) by mouth once daily. 30 tablet 6    TURMERIC ORAL Take by mouth.      vit A/vit C/vit E/zinc/copper (OCUVITE PRESERVISION ORAL) Take by mouth.      ascorbic acid (VITAMIN C) 1000 MG tablet Take 1,000 mg by mouth once daily.      aspirin (ECOTRIN) 81 MG EC tablet Take 81 mg by mouth every other day.       blood sugar diagnostic (ACCU-CHEK GERMÁN PLUS TEST STRP) Strp USE ONE STRIP TO TEST DAILY. 100 strip 11    blood sugar diagnostic (ACCU-CHEK GERMÁN PLUS TEST STRP) Strp USE ONE STRIP TO TEST DAILY. 300 strip 1    CALCIUM CARBONATE/VITAMIN D3 (VITAMIN D-3 ORAL) Take 500 mg by mouth once daily.      cholecalciferol, vitamin D3, (VITAMIN D3) 5,000 unit Tab Take 10,000 Units by mouth once daily.      diclofenac sodium (VOLTAREN) 1 % Gel Apply 2 g topically once daily. 100 g 3    ginkgo biloba 40 mg Tab Take by mouth.      glucosamine-chondroitin 500-400 mg tablet Take 1 tablet by mouth 3 (three) times daily.      KRILL OIL ORAL Take by mouth once daily.       lancets (ONETOUCH DELICA LANCETS) 33 gauge Misc Check fasting glucose daily 300 each 3    lancets Misc Check  "glucose daily 100 each 3    [DISCONTINUED] chlorthalidone (HYGROTEN) 25 MG Tab Take 1 tablet (25 mg total) by mouth once daily. 30 tablet 11    [DISCONTINUED] lisinopriL (PRINIVIL,ZESTRIL) 40 MG tablet Take 1 tablet (40 mg total) by mouth once daily. (Patient not taking: Reported on 6/16/2020) 30 tablet 11     No current facility-administered medications on file prior to visit.        Physical Exam:   /60   Pulse 60   Ht 5' 1" (1.549 m)   Wt 63.6 kg (140 lb 3.4 oz)   BMI 26.49 kg/m²   Physical Exam    Labs:     Lab Results   Component Value Date    WBC 7.63 04/09/2020    HGB 11.2 (L) 04/09/2020    HCT 36.2 (L) 04/09/2020     04/09/2020    GRAN 5.3 04/09/2020    GRAN 69.9 04/09/2020        Chemistry        Component Value Date/Time     04/09/2020 1414    K 4.8 04/09/2020 1414     04/09/2020 1414    CO2 24 04/09/2020 1414    BUN 39 (H) 04/09/2020 1414    CREATININE 1.2 04/09/2020 1414     (H) 04/09/2020 1414        Component Value Date/Time    CALCIUM 10.2 04/09/2020 1414    ALKPHOS 55 04/09/2020 1414    AST 16 04/09/2020 1414    ALT 11 04/09/2020 1414    BILITOT 0.3 04/09/2020 1414    ESTGFRAFRICA 47.3 (A) 04/09/2020 1414    EGFRNONAA 41.0 (A) 04/09/2020 1414          Lab Results   Component Value Date    ALT 11 04/09/2020    AST 16 04/09/2020    ALKPHOS 55 04/09/2020    BILITOT 0.3 04/09/2020      Lab Results   Component Value Date    HGBA1C 7.4 (H) 04/09/2020     No results found for: BNP, BNPTRIAGEBLO  Lab Results   Component Value Date    CHOL 309 (H) 04/09/2020    HDL 48 04/09/2020    LDLCALC 213.0 (H) 04/09/2020    TRIG 240 (H) 04/09/2020     Lab Results   Component Value Date    INR 1.1 03/14/2016        I have reviewed all pertinent labs within the past 24 hours.    Cardiovascular Imaging:   CT: No results found in the last 24 hours.  CXR: No results found in the last 24 hours.  U/S: No results found in the last 24 hours.  I have reviewed and interpreted all pertinent " imaging results/findings within the past 24 hours.  Echo: No results found for: EF  2D echo with color flow doppler:   Results for orders placed or performed in visit on 10/26/17   2D Echo w/ Color Flow Doppler   Result Value Ref Range    QEF 60 55 - 65    Mitral Valve Regurgitation MODERATE (A)     Est. PA Systolic Pressure 23.07     Narrative    Date of Procedure: 10/26/2017        TEST DESCRIPTION       Aorta: The aortic root is normal in size, measuring 2.8 cm at sinotubular junction and 3.0 cm at Sinuses of Valsalva. The proximal ascending aorta is normal in size, measuring 3.0 cm across.     Left Atrium: The left atrial volume index is mildly enlarged, measuring 36.36 cc/m2.     Left Ventricle: The left ventricle is normal in size, with an end-diastolic diameter of 4.7 cm, and an end-systolic diameter of 3.2 cm. LV wall thickness is normal, with the septum measuring 0.8 cm and the posterior wall measuring 0.6 cm across. Relative   wall thickness was normal at 0.26, and the LV mass index was 69.4 g/m2 consistent with normal left ventricular mass. There are no regional wall motion abnormalities. Left ventricular systolic function appears normal. Visually estimated ejection fraction   is 60-65%. The LV Doppler derived stroke volume equals 86.0 ccs.     There is significant mitral annular calcification and therefore the E/E' ratio was not used to determine LV diastolic function or LA pressure.     Right Atrium: The right atrium is normal in size, measuring 3.9 cm in length and 3.1 cm in width in the apical view.     Right Ventricle: The right ventricle is normal in size measuring 2.7 cm at the base in the apical right ventricle-focused view. Global right ventricular systolic function appears normal. Tricuspid annular plane systolic excursion (TAPSE) is 2.2 cm. The   estimated PA systolic pressure is 23 mmHg.     Aortic Valve:  The aortic valve is mildly sclerotic with normal leaflet mobility. Using a left  ventricular outflow tract diameter of 2.0 cm, a left ventricular outflow tract velocity time integral of 28 cm, and a peak instantaneous transvalvular velocity   time integral of 44 cm, the calculated aortic valve area is 1.95 cm2.     Mitral Valve:  The mitral valve is mildly sclerotic. There is moderate eccentric mitral regurgitation directed posteriorly. It is best seen in the parasternal long view. There is marked mitral annular calcification. There is restricted movement of the   posterior leaflet.     Tricuspid Valve:  The tricuspid valve is normal in structure with normal leaflet mobility.     Pulmonary Valve:  The pulmonic valve is normal in structure with normal leaflet mobility.     IVC: IVC is normal in size and collapses > 50% with a sniff, suggesting normal right atrial pressure of 3 mmHg.     Intracavitary: There is no evidence of pericardial effusion, intracavity mass, thrombi, or vegetation.         CONCLUSIONS     1 - Mild left atrial enlargement.     2 - Normal left ventricular systolic function (EF 60-65%).     3 - No wall motion abnormalities.     4 - Normal right ventricular systolic function .     5 - Moderate mitral regurgitation.     6 - The estimated PA systolic pressure is 23 mmHg.             This document has been electronically    SIGNED BY: Juma Blanchard MD On: 10/26/2017 17:59    This document was originally electronically signed on: 10/26/2017 17:57    and Transthoracic echo (TTE) complete (Cupid Only):   Results for orders placed or performed during the hospital encounter of 03/02/20   Echo Color Flow Doppler? Yes   Result Value Ref Range    Ascending aorta 2.90 cm    STJ 2.31 cm    AV mean gradient 6 mmHg    Ao peak henrique 1.64 m/s    Ao VTI 34.72 cm    IVRT 0.11 msec    IVS 0.97 0.6 - 1.1 cm    LA size 4.40 cm    Left Atrium Major Axis 5.80 cm    Left Atrium Minor Axis 5.99 cm    LVIDD 4.82 3.5 - 6.0 cm    LVIDS 2.56 2.1 - 4.0 cm    LVOT diameter 2.02 cm    LVOT peak VTI 22.56 cm     PW 1.07 0.6 - 1.1 cm    MV Peak A Indra 1.33 m/s    E wave decelartion time 306.30 msec    MV Peak E Indra 1.26 m/s    PV Peak D Indra 0.51 m/s    PV Peak S Indar 0.50 m/s    RA Major Axis 4.84 cm    RA Width 3.71 cm    RVDD 3.52 cm    Sinus 2.67 cm    TAPSE 2.45 cm    TDI LATERAL 0.06 m/s    TDI SEPTAL 0.05 m/s    LA WIDTH 4.04 cm    LV Diastolic Volume 108.70 mL    LV Systolic Volume 23.69 mL    RV S' 13.94 cm/s    LVOT peak indra 1.06 m/s    LV LATERAL E/E' RATIO 21.00 m/s    LV SEPTAL E/E' RATIO 25.20 m/s    FS 47 %    LA volume 89.05 cm3    LV mass 176.03 g    Left Ventricle Relative Wall Thickness 0.44 cm    AV valve area 2.08 cm2    AV Velocity Ratio 0.65     AV index (prosthetic) 0.65     E/A ratio 0.95     Mean e' 0.06 m/s    Pulm vein S/D ratio 0.98     LVOT area 3.2 cm2    LVOT stroke volume 72.26 cm3    AV peak gradient 11 mmHg    E/E' ratio 22.91 m/s    BSA 1.69 m2    LV Systolic Volume Index 14.3 mL/m2    LV Diastolic Volume Index 65.58 mL/m2    LA Volume Index 53.7 mL/m2    LV Mass Index 106 g/m2    Right Atrial Pressure (from IVC) 3 mmHg    Narrative    · Concentric left ventricular hypertrophy.  · Normal left ventricular systolic function. The estimated ejection   fraction is 65%.  · Normal right ventricular systolic function.  · Grade II (moderate) left ventricular diastolic dysfunction consistent   with pseudonormalization.  · Severe left atrial enlargement.  · Severe eccentric mitral regurgitation.  · Normal central venous pressure (3 mmHg).              Assessment & Plan:   FRANK (dyspnea on exertion)  -    Patient has risk factors for coronary artery disease including increasing dyspnea on exertion, diabetes, hypertension, hyperlipidemia.  She has a history of carotid artery stenosis.  Discussed stress testing with patient and her son.  Declined stress testing at this time.  - EKG performed in office showed normal sinus rhythm with ST depressions V4 through V6 which are present on prior but are now more  pronounced.  She is currently on aspirin and Zetia.  -Echo from March 2, 2020 is notable for severe mitral regurgitation.  And moderate MAC.  Reviewed echo in clinic today.  Patient is not a candidate for mitral valve replacement and or MitraClip.  Likely her symptoms are related to severe mitral regurgitation and diastolic dysfunction  - Ordered basic labs for evaluation of dyspnea and fatigue. Will contact patient with results.   -     CBC W/ AUTO DIFFERENTIAL; Future; Expected date: 06/16/2020  -     LACTIC ACID, PLASMA; Future; Expected date: 06/16/2020  -     Procalcitonin; Future; Expected date: 06/16/2020  -     COMPREHENSIVE METABOLIC PANEL; Future; Expected date: 06/16/2020  -     Magnesium; Future; Expected date: 06/16/2020  -     PHOSPHORUS; Future; Expected date: 06/16/2020  -     B-TYPE NATRIURETIC PEPTIDE; Future; Expected date: 06/16/2020  -     TSH; Future; Expected date: 06/16/2020         The ASCVD Risk score (Liudmila KIMBERLY Jr., et al., 2013) failed to calculate for the following reasons:    The 2013 ASCVD risk score is only valid for ages 40 to 79     -RTC in 3 months.      Patient was discussed with  who agrees with the assessment and plan as above.    Young Delarosa - Pager# [phone]  6/16/2020  2:51 PM  Cardiovascular Fellow  [facility]

## 2020-06-16 NOTE — TELEPHONE ENCOUNTER
Contacted patient's son regarding labs results. Patient had an acute hgb drop over the past month to 6. She has complaints of fatigue, dyspnea on exertion, and hypotension. No known GI bleeding.     Encouraged  Alvarado to bring the patient to the ED for blood transfusions and evaluation for anemia. He is in agreement and will bring the patient to ED tonight.

## 2020-06-16 NOTE — PROGRESS NOTES
I have reviewed the notes, assessments, and/or procedures performed by Dr Delarosa, I concur with her/his documentation of Luiza Childers.

## 2020-06-16 NOTE — PROVIDER PROGRESS NOTES - EMERGENCY DEPT.
Emergency Department TeleTRIAGE Encounter Note      CHIEF COMPLAINT    Chief Complaint   Patient presents with    Abnormal Lab     low h/h sent here for transfusion and admission       VITAL SIGNS   Initial Vitals [06/16/20 1746]   BP Pulse Resp Temp SpO2   (!) 147/67 65 18 98.9 °F (37.2 °C) 99 %      MAP       --            ALLERGIES    Review of patient's allergies indicates:  No Known Allergies    PROVIDER TRIAGE NOTE  Patient with past medical history diabetes and hypertension presents to the ED for evaluation of anemia.  Patient was seen by her cardiologist this morning.  Labs at that time showed H&H 6 and 20.  Patient was called to come back to the ED for blood transfusion and admission.  Patient reports shortness of breath with minimal activities over the past few weeks.  She denies dark or tarry stools.  She denies chest pain, abdominal pain, back pain at this time.      ORDERS  Labs Reviewed - No data to display    ED Orders (720h ago, onward)    Start Ordered     Status Ordering Provider    06/16/20 1755 06/16/20 1754  Insert Saline lock IV  Once      Ordered CLINT NUNES    06/16/20 1755 06/16/20 1754  Type & Screen  STAT      Ordered CLINT NUNES            Virtual Visit Note: The provider triage portion of this emergency department evaluation and documentation was performed via [employer], a HIPAA-compliant telemedicine application, in concert with a tele-presenter in the room. A face to face patient evaluation with one of my colleagues will occur once the patient is placed in an emergency department room.      DISCLAIMER: This note was prepared with [employer] voice recognition transcription software. Garbled syntax, mangled pronouns, and other bizarre constructions may be attributed to that software system.

## 2020-06-17 ENCOUNTER — ANESTHESIA EVENT (OUTPATIENT)
Dept: ENDOSCOPY | Facility: HOSPITAL | Age: 85
DRG: 378 | End: 2020-06-17
Payer: MEDICARE

## 2020-06-17 LAB
ALBUMIN SERPL BCP-MCNC: 3.4 G/DL (ref 3.5–5.2)
ALP SERPL-CCNC: 40 U/L (ref 55–135)
ALT SERPL W/O P-5'-P-CCNC: 14 U/L (ref 10–44)
ANION GAP SERPL CALC-SCNC: 7 MMOL/L (ref 8–16)
AST SERPL-CCNC: 21 U/L (ref 10–40)
BASOPHILS # BLD AUTO: 0.02 K/UL (ref 0–0.2)
BASOPHILS NFR BLD: 0.2 % (ref 0–1.9)
BILIRUB SERPL-MCNC: 1.1 MG/DL (ref 0.1–1)
BUN SERPL-MCNC: 36 MG/DL (ref 8–23)
CALCIUM SERPL-MCNC: 8.7 MG/DL (ref 8.7–10.5)
CHLORIDE SERPL-SCNC: 108 MMOL/L (ref 95–110)
CO2 SERPL-SCNC: 22 MMOL/L (ref 23–29)
CREAT SERPL-MCNC: 1.3 MG/DL (ref 0.5–1.4)
DIFFERENTIAL METHOD: ABNORMAL
EOSINOPHIL # BLD AUTO: 0.2 K/UL (ref 0–0.5)
EOSINOPHIL NFR BLD: 2.9 % (ref 0–8)
ERYTHROCYTE [DISTWIDTH] IN BLOOD BY AUTOMATED COUNT: 14.7 % (ref 11.5–14.5)
EST. GFR  (AFRICAN AMERICAN): 42.6 ML/MIN/1.73 M^2
EST. GFR  (NON AFRICAN AMERICAN): 37 ML/MIN/1.73 M^2
GLUCOSE SERPL-MCNC: 132 MG/DL (ref 70–110)
HCT VFR BLD AUTO: 26.7 % (ref 37–48.5)
HGB BLD-MCNC: 8.1 G/DL (ref 12–16)
IMM GRANULOCYTES # BLD AUTO: 0.03 K/UL (ref 0–0.04)
IMM GRANULOCYTES NFR BLD AUTO: 0.4 % (ref 0–0.5)
LYMPHOCYTES # BLD AUTO: 1.1 K/UL (ref 1–4.8)
LYMPHOCYTES NFR BLD: 14 % (ref 18–48)
MAGNESIUM SERPL-MCNC: 1.9 MG/DL (ref 1.6–2.6)
MCH RBC QN AUTO: 28.9 PG (ref 27–31)
MCHC RBC AUTO-ENTMCNC: 30.3 G/DL (ref 32–36)
MCV RBC AUTO: 95 FL (ref 82–98)
MONOCYTES # BLD AUTO: 0.4 K/UL (ref 0.3–1)
MONOCYTES NFR BLD: 4.5 % (ref 4–15)
NEUTROPHILS # BLD AUTO: 6.4 K/UL (ref 1.8–7.7)
NEUTROPHILS NFR BLD: 78 % (ref 38–73)
NRBC BLD-RTO: 0 /100 WBC
PHOSPHATE SERPL-MCNC: 4.2 MG/DL (ref 2.7–4.5)
PLATELET # BLD AUTO: 172 K/UL (ref 150–350)
PMV BLD AUTO: 11.1 FL (ref 9.2–12.9)
POCT GLUCOSE: 120 MG/DL (ref 70–110)
POCT GLUCOSE: 148 MG/DL (ref 70–110)
POCT GLUCOSE: 154 MG/DL (ref 70–110)
POCT GLUCOSE: 166 MG/DL (ref 70–110)
POTASSIUM SERPL-SCNC: 4.3 MMOL/L (ref 3.5–5.1)
PROT SERPL-MCNC: 6 G/DL (ref 6–8.4)
RBC # BLD AUTO: 2.8 M/UL (ref 4–5.4)
SODIUM SERPL-SCNC: 137 MMOL/L (ref 136–145)
WBC # BLD AUTO: 8.16 K/UL (ref 3.9–12.7)

## 2020-06-17 PROCEDURE — 83735 ASSAY OF MAGNESIUM: CPT

## 2020-06-17 PROCEDURE — 25000003 PHARM REV CODE 250: Performed by: HOSPITALIST

## 2020-06-17 PROCEDURE — 63600175 PHARM REV CODE 636 W HCPCS: Performed by: HOSPITALIST

## 2020-06-17 PROCEDURE — 11000001 HC ACUTE MED/SURG PRIVATE ROOM

## 2020-06-17 PROCEDURE — 99223 PR INITIAL HOSPITAL CARE,LEVL III: ICD-10-PCS | Mod: ,,, | Performed by: INTERNAL MEDICINE

## 2020-06-17 PROCEDURE — 80053 COMPREHEN METABOLIC PANEL: CPT

## 2020-06-17 PROCEDURE — 36415 COLL VENOUS BLD VENIPUNCTURE: CPT

## 2020-06-17 PROCEDURE — 85025 COMPLETE CBC W/AUTO DIFF WBC: CPT

## 2020-06-17 PROCEDURE — 99223 1ST HOSP IP/OBS HIGH 75: CPT | Mod: ,,, | Performed by: INTERNAL MEDICINE

## 2020-06-17 PROCEDURE — 99232 PR SUBSEQUENT HOSPITAL CARE,LEVL II: ICD-10-PCS | Mod: ,,, | Performed by: STUDENT IN AN ORGANIZED HEALTH CARE EDUCATION/TRAINING PROGRAM

## 2020-06-17 PROCEDURE — 84100 ASSAY OF PHOSPHORUS: CPT

## 2020-06-17 PROCEDURE — C9113 INJ PANTOPRAZOLE SODIUM, VIA: HCPCS | Performed by: HOSPITALIST

## 2020-06-17 PROCEDURE — 99232 SBSQ HOSP IP/OBS MODERATE 35: CPT | Mod: ,,, | Performed by: STUDENT IN AN ORGANIZED HEALTH CARE EDUCATION/TRAINING PROGRAM

## 2020-06-17 PROCEDURE — 25000003 PHARM REV CODE 250

## 2020-06-17 RX ORDER — POLYETHYLENE GLYCOL 3350, SODIUM SULFATE ANHYDROUS, SODIUM BICARBONATE, SODIUM CHLORIDE, POTASSIUM CHLORIDE 236; 22.74; 6.74; 5.86; 2.97 G/4L; G/4L; G/4L; G/4L; G/4L
4000 POWDER, FOR SOLUTION ORAL ONCE
Status: COMPLETED | OUTPATIENT
Start: 2020-06-17 | End: 2020-06-17

## 2020-06-17 RX ADMIN — HYDROCODONE BITARTRATE AND ACETAMINOPHEN 1 TABLET: 10; 325 TABLET ORAL at 02:06

## 2020-06-17 RX ADMIN — Medication 6 MG: at 10:06

## 2020-06-17 RX ADMIN — Medication 6 MG: at 02:06

## 2020-06-17 RX ADMIN — PANTOPRAZOLE SODIUM 40 MG: 40 INJECTION, POWDER, LYOPHILIZED, FOR SOLUTION INTRAVENOUS at 08:06

## 2020-06-17 RX ADMIN — POLYETHYLENE GLYCOL 3350, SODIUM SULFATE ANHYDROUS, SODIUM BICARBONATE, SODIUM CHLORIDE, POTASSIUM CHLORIDE 4000 ML: 236; 22.74; 6.74; 5.86; 2.97 POWDER, FOR SOLUTION ORAL at 05:06

## 2020-06-17 NOTE — PROGRESS NOTES
Hospital Medicine  Progress Note      Patient Name: Luiza Childers  MRN:  [id number]  [facility] Medicine Team: [facility] CORY Lopez MD  Date of Admission:  6/16/2020     Length of Stay:  LOS: 1 day   Expected Discharge Date:   Principal Problem:  Symptomatic anemia      Subjective:    Interval History/Overnight Events:  Patient admitted overnight with symptomatic anemia, started on protonix and transfused 2U PRBC's with rise in Hb from 6.1 to 8.1. GI consulted, plan on clear liquid diet today and EGD/Colonoscopy tomorrow. Reports improvement in symptoms, denies any chest pain/SOB, palpitations, melena or BRBPR.       Review of Systems:  Constitutional: Negative for chills, fatigue, fever.   Respiratory: Negative for cough, shortness of breath.    Cardiovascular: Negative for chest pain, palpitations, leg swelling.   Gastrointestinal: Negative for abdominal pain, nausea, vomiting, diarrhea, constipation  Genitourinary: Negative for dysuria, frequency.   All other systems reviewed and are negative.      Objective:    Physical Exam:  Temp:  [96.3 °F (35.7 °C)-98.9 °F (37.2 °C)]   Pulse:  [60-72]   Resp:  []   BP: (105-186)/(55-91)   SpO2:  [16 %-100 %]     Constitutional: Appears comfortable, in no acute distress  Eyes: No scleral icterus or eye discharge  Cardiovascular: Normal heart rate.  Regular heart rhythm.  No murmur heard.  Pulmonary/Chest: Effort normal and breath sounds normal. No respiratory distress. No wheezes, rales, or rhonchi  Abdominal: Soft. Bowel sounds are normal.  No distension.  No tenderness  Musculoskeletal: No deformities.  No edema.   Neurological: Alert.  Oriented to person, place, and time.   Skin: Skin is warm and dry.       Assessment and Plan:  Ms. Luiza Childers is a 87 y.o. female who presented to [facility] on 6/16/2020 with symptomatic anemia.     Symptomatic Anemia  GI Hemorrhage  Acute Blood Loss Anemia  · GIB Pathway initiated  · Hgb 6 on admit, baseline around  11  · Start Protonix 40mg IV BID  · GI consulted, appreciate assistance  · NPO at midnight for EGD and colonoscopy tomorrow  · Type and screen, blood consent obtained  · Transfuse 2 units PRBCs, appropriate rise in Hb to 8.1 today  · Check iron studies and hemolysis labs     Type 2 DM without Long Term Insulin Use  · HbA1c 5.8  · Home DM regimen:  Zetia 10mg daily  · SSI with POCT accuchecks and Clear liquid diet today in preparation of EGD/Colonoscopy tomorrow     CKD Stage 3  · Chronic and stable  · Montior     Mild Cognitive Impairment  · Oriented to person and place at baseline     Diet:  Clear liquids then NPO at midnight  VTE PPx:  Holding with anemia  Goals of Care:  Full        Disposition:  Pending GI recs and anemia  Discharge Needs:  LAMONTE Lopez MD  [facility]  Spectra: [phone]

## 2020-06-17 NOTE — PLAN OF CARE
PT has been very confused and has cont to get out of bed with out calling for assistance. She has pulled telemetry equipment off and thrown it on floor or placed it on over bed table  several times. She was unable to retain any education no evidence of learning.     LOC: The patient is awake, alert, aware of environment with an appropriate affect. Disoriented to time, place and situation she is very forgetful    SKIN: Skin warm, dry and intact, normal skin turgor, moist mucus membranes  RESPIRATORY: Airway is open and patent, respirations are spontaneous, even and unlabored, normal effort and rate. +sob on exertion  CARDIAC: paced rhythm at 60 beats per min, no peripheral edema noted, capillary refill < 3 seconds, bilateral radial pulses 2+  ABDOMEN: Soft, nontender, nondistended.   NEUROLOGIC:  facial expression is symmetrical, patient moving all extremities spontaneously, normal sensation in all extremities when touched with a finger.  Follows all commands appropriately  MUSCULOSKELETAL: No obvious deformities. +generalized weakness; assistance needed with ambulating      Problem: Fall Injury Risk  Goal: Absence of Fall and Fall-Related Injury  Outcome: Ongoing, Not Progressing  Intervention: Identify and Manage Contributors to Fall Injury Risk  Flowsheets (Taken 6/17/2020 0646)  Self-Care Promotion: safe use of adaptive equipment encouraged  Medication Review/Management: medications reviewed  Intervention: Promote Injury-Free Environment  Flowsheets (Taken 6/17/2020 0646)  Safety Promotion/Fall Prevention:   assistive device/personal item within reach   bed alarm set   Fall Risk signage in place   lighting adjusted   upper side rails raised x 2, lower siderails raised x 1 (Peds only)  Environmental Safety Modification:   assistive device/personal items within reach   clutter free environment maintained   lighting adjusted

## 2020-06-17 NOTE — ED TRIAGE NOTES
Luiza Childers, a 87 y.o. female presents to the ED     Triage note:  Chief Complaint   Patient presents with    Abnormal Lab     low h/h sent here for transfusion and admission     Pt and son reports hypotension starting last week and SOB on exertion this week. She had blood work done by PCP and showed low H&H. She stopped taking her bp meds last Monday. She is disoriented to time which is normal for her. Denies blood in stool.       Review of patient's allergies indicates:  No Known Allergies  Past Medical History:   Diagnosis Date    Diabetes mellitus, type 2     Hypertension     Syncope and collapse        LOC: The patient is awake, alert, aware of environment with an appropriate affect. Disoriented to time, speaking appropriately +forgetful  APPEARANCE: Pt resting comfortably, in no acute distress, pt is clean and well groomed, clothing properly fastened  SKIN: Skin warm, dry and intact, normal skin turgor, moist mucus membranes  RESPIRATORY: Airway is open and patent, respirations are spontaneous, even and unlabored, normal effort and rate. +sob on exertion  CARDIAC: paced rhythm at 60 beats per min, no peripheral edema noted, capillary refill < 3 seconds, bilateral radial pulses 2+  ABDOMEN: Soft, nontender, nondistended.   NEUROLOGIC:  facial expression is symmetrical, patient moving all extremities spontaneously, normal sensation in all extremities when touched with a finger.  Follows all commands appropriately  MUSCULOSKELETAL: No obvious deformities. +generalized weakness; assistance needed with ambulating; pt brought her home wheelchair

## 2020-06-17 NOTE — ANESTHESIA PREPROCEDURE EVALUATION
[facility]  Anesthesia Pre-Operative Evaluation         Patient Name: Luiza Childers  YOB: 1933  MRN: [id number]    SUBJECTIVE:     Pre-operative evaluation for Procedure(s) (LRB):  EGD (ESOPHAGOGASTRODUODENOSCOPY) (N/A)  COLONOSCOPY (N/A)     06/17/2020    Luiza Childers is a 87 y.o. female w/ a significant PMHx of DM, HTN, on asa 81 daily, was transferred from the cardiology clinic for evaluation of anemia. Upon arrival patient is vitally stable, afebrile, was given 2 units or pRbc, hb this morning is 8.1, iron studies show NHI, BUN 41, cr 1.4, no previous EGD/colonoscopy in our system. patient has mild cognitive impairment (not oriented to month or year at baseline).  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     Patient now presents for the above procedure(s).      LDA: None documented.       Peripheral IV - Single Lumen 06/16/20 1920 18 G Left Antecubital (Active)   Site Assessment Clean;Dry;Intact;No redness;No swelling;No drainage;No warmth 06/17/20 0823   Line Status Flushed;Saline locked 06/17/20 0823   Dressing Status Clean;Dry;Intact 06/17/20 0823   Dressing Intervention Integrity maintained 06/17/20 0823   Dressing Change Due 06/20/20 06/17/20 0823   Site Change Due 06/20/20 06/17/20 0823   Reason Not Rotated Not due 06/17/20 0823   Number of days: 0            Peripheral IV - Single Lumen 06/16/20 1940 20 G Right Forearm (Active)   Site Assessment Clean;Dry;Intact;No redness;No swelling;No drainage;No warmth 06/17/20 0823   Line Status Flushed;Saline locked 06/17/20 0823   Dressing Status Clean;Dry;Intact 06/17/20 0823   Dressing Intervention Integrity maintained 06/17/20 0823   Dressing Change Due 06/20/20 06/17/20 0823   Site Change Due 06/20/20 06/17/20 0823   Reason Not Rotated Not due 06/17/20 0823   Number of days: 0       Prev airway: None documented.    Drips: None documented.      Patient Active Problem List   Diagnosis    Diabetes  mellitus, type 2    Hypertension associated with diabetes    Diabetic macular edema, both eyes    Mild nonproliferative diabetic retinopathy of both eyes    Posterior vitreous detachment, both eyes    Hypercholesteremia    Cardiac pacemaker in situ    Bilateral carotid artery stenosis    CKD stage 3 due to type 2 diabetes mellitus    Symptomatic anemia    GI hemorrhage    Mild cognitive impairment    Acute blood loss anemia       Review of patient's allergies indicates:  No Known Allergies    Current Inpatient Medications:   pantoprazole  40 mg Intravenous BID    polyethylene glycol  4,000 mL Oral Once       No current facility-administered medications on file prior to encounter.      Current Outpatient Medications on File Prior to Encounter   Medication Sig Dispense Refill    blood sugar diagnostic (ACCU-CHEK GERMÁN PLUS TEST STRP) Strp USE ONE STRIP TO TEST DAILY. 100 strip 11    alpha lipoic acid 300 mg Cap Take by mouth.      aspirin (ECOTRIN) 81 MG EC tablet Take 81 mg by mouth once daily.      blood sugar diagnostic (ACCU-CHEK GERMÁN PLUS TEST STRP) Strp USE ONE STRIP TO TEST DAILY. 300 strip 1    cholecalciferol, vitamin D3, (VITAMIN D3) 5,000 unit Tab Take 10,000 Units by mouth once daily.      co-enzyme Q-10 30 mg capsule Take 30 mg by mouth once daily.       cranberry 400 mg Cap Take by mouth.      diclofenac sodium (VOLTAREN) 1 % Gel Apply 2 g topically once daily. 100 g 3    ezetimibe (ZETIA) 10 mg tablet Take 1 tablet (10 mg total) by mouth once daily. 30 tablet 6    lancets (ONETOUCH DELICA LANCETS) 33 gauge Misc Check fasting glucose daily 300 each 3    lancets Misc Check glucose daily 100 each 3    TURMERIC ORAL Take by mouth.      vit A/vit C/vit E/zinc/copper (OCUVITE PRESERVISION ORAL) Take by mouth.         Past Surgical History:   Procedure Laterality Date    CARDIAC PACEMAKER PLACEMENT      CATARACT EXTRACTION      HYSTERECTOMY         Social History      Socioeconomic History    Marital status:      Spouse name: Not on file    Number of children: Not on file    Years of education: Not on file    Highest education level: Not on file   Occupational History    Not on file   Social Needs    Financial resource strain: Not hard at all    Food insecurity     Worry: Never true     Inability: Never true    Transportation needs     Medical: No     Non-medical: No   Tobacco Use    Smoking status: Former Smoker     Years: 25.00     Quit date: 1974     Years since quittin.0    Smokeless tobacco: Never Used   Substance and Sexual Activity    Alcohol use: No     Alcohol/week: 0.0 standard drinks     Frequency: Monthly or less     Drinks per session: 1 or 2     Binge frequency: Never    Drug use: No    Sexual activity: Not Currently   Lifestyle    Physical activity     Days per week: 0 days     Minutes per session: 10 min    Stress: To some extent   Relationships    Social connections     Talks on phone: Three times a week     Gets together: Once a week     Attends Druze service: Not on file     Active member of club or organization: No     Attends meetings of clubs or organizations: Never     Relationship status:    Other Topics Concern    Not on file   Social History Narrative    2 kids, son local, dtr in [place].    .    Lives alone, she drives, memory [place].       OBJECTIVE:     Vital Signs Range (Last 24H):  Temp:  [35.7 °C (96.3 °F)-37.2 °C (98.9 °F)]   Pulse:  [60-72]   Resp:  []   BP: (105-186)/(55-91)   SpO2:  [16 %-100 %]       Significant Labs:  Lab Results   Component Value Date    WBC 8.16 2020    HGB 8.1 (L) 2020    HCT 26.7 (L) 2020     2020    CHOL 309 (H) 2020    TRIG 240 (H) 2020    HDL 48 2020    ALT 14 2020    AST 21 2020     2020    K 4.3 2020     2020    CREATININE 1.3 2020    BUN 36 (H) 2020    CO2 22 (L)  06/17/2020    TSH 1.409 06/16/2020    INR 1.1 03/14/2016    HGBA1C 5.8 (H) 06/16/2020       Diagnostic Studies: No relevant studies.    EKG:   Results for orders placed or performed in visit on 06/16/20   EKG 12-lead    Collection Time: 06/16/20  7:55 PM    Narrative    Test Reason :     Vent. Rate : 060 BPM     Atrial Rate : 060 BPM     P-R Int : 208 ms          QRS Dur : 098 ms      QT Int : 426 ms       P-R-T Axes : 072 046 081 degrees     QTc Int : 426 ms    Age and gender specific analysis  Normal sinus rhythm  Incomplete right bundle branch block  ST and T wave abnormality, consider lateral ischemia  Abnormal ECG  When compared with ECG of 16-JUN-2020 13:19,  No significant change was found    Referred By: [email or web address]   SELF           Confirmed By:        2D ECHO:  TTE:  Results for orders placed or performed during the hospital encounter of 03/02/20   Echo Color Flow Doppler? Yes   Result Value Ref Range    Ascending aorta 2.90 cm    STJ 2.31 cm    AV mean gradient 6 mmHg    Ao peak indra 1.64 m/s    Ao VTI 34.72 cm    IVRT 0.11 msec    IVS 0.97 0.6 - 1.1 cm    LA size 4.40 cm    Left Atrium Major Axis 5.80 cm    Left Atrium Minor Axis 5.99 cm    LVIDD 4.82 3.5 - 6.0 cm    LVIDS 2.56 2.1 - 4.0 cm    LVOT diameter 2.02 cm    LVOT peak VTI 22.56 cm    PW 1.07 0.6 - 1.1 cm    MV Peak A Indra 1.33 m/s    E wave decelartion time 306.30 msec    MV Peak E Indra 1.26 m/s    PV Peak D Indra 0.51 m/s    PV Peak S Indra 0.50 m/s    RA Major Axis 4.84 cm    RA Width 3.71 cm    RVDD 3.52 cm    Sinus 2.67 cm    TAPSE 2.45 cm    TDI LATERAL 0.06 m/s    TDI SEPTAL 0.05 m/s    LA WIDTH 4.04 cm    LV Diastolic Volume 108.70 mL    LV Systolic Volume 23.69 mL    RV S' 13.94 cm/s    LVOT peak indra 1.06 m/s    LV LATERAL E/E' RATIO 21.00 m/s    LV SEPTAL E/E' RATIO 25.20 m/s    FS 47 %    LA volume 89.05 cm3    LV mass 176.03 g    Left Ventricle Relative Wall Thickness 0.44 cm    AV valve area 2.08 cm2    AV Velocity Ratio 0.65     AV index  (prosthetic) 0.65     E/A ratio 0.95     Mean e' 0.06 m/s    Pulm vein S/D ratio 0.98     LVOT area 3.2 cm2    LVOT stroke volume 72.26 cm3    AV peak gradient 11 mmHg    E/E' ratio 22.91 m/s    BSA 1.69 m2    LV Systolic Volume Index 14.3 mL/m2    LV Diastolic Volume Index 65.58 mL/m2    LA Volume Index 53.7 mL/m2    LV Mass Index 106 g/m2    Right Atrial Pressure (from IVC) 3 mmHg    Narrative    · Concentric left ventricular hypertrophy.  · Normal left ventricular systolic function. The estimated ejection   fraction is 65%.  · Normal right ventricular systolic function.  · Grade II (moderate) left ventricular diastolic dysfunction consistent   with pseudonormalization.  · Severe left atrial enlargement.  · Severe eccentric mitral regurgitation.  · Normal central venous pressure (3 mmHg).          MARY ANNE:  No results found for this or any previous visit.    ASSESSMENT/PLAN:                                                                                                                  06/17/2020  Luiza Childers is a 87 y.o., female.    Anesthesia Evaluation    I have reviewed the Patient Summary Reports.     I have reviewed the Nursing Notes.       Review of Systems  Anesthesia Hx:  No problems with previous Anesthesia Denies Hx of Anesthetic complications  Denies Family Hx of Anesthesia complications.   Denies Personal Hx of Anesthesia complications.   Hematology/Oncology:         -- Anemia:   Cardiovascular:   Pacemaker Hypertension    Renal/:   Chronic Renal Disease    Endocrine:   Diabetes, type 2        Physical Exam  General:  Well nourished    Airway/Jaw/Neck:  Airway Findings: Mouth Opening: Normal Tongue: Normal  General Airway Assessment: Adult  Mallampati: III      Dental:  Dental Findings: Upper Dentures, Lower Dentures Single tooth remaining on lower mandible    Chest/Lungs:  Chest/Lungs Findings: Normal Respiratory Rate         Mental Status:  Mental Status Findings:  Cooperative, Alert and  Oriented         Anesthesia Plan  Type of Anesthesia, risks & benefits discussed:  Anesthesia Type:  MAC  Patient's Preference:   Intra-op Monitoring Plan: standard ASA monitors  Intra-op Monitoring Plan Comments:   Post Op Pain Control Plan: multimodal analgesia, IV/PO Opioids PRN and per primary service following discharge from PACU  Post Op Pain Control Plan Comments:   Induction:   IV  Beta Blocker:  Patient is not currently on a Beta-Blocker (No further documentation required).       Informed Consent: Patient representative understands risks and agrees with Anesthesia plan.  Questions answered. Anesthesia consent signed with patient representative.  ASA Score: 3     Day of Surgery Review of History & Physical:    H&P update referred to the surgeon.         Ready For Surgery From Anesthesia Perspective.

## 2020-06-17 NOTE — CONSULTS
[facility]  Gastroenterology  Consult Note    Patient Name: Luiza Childers  MRN: [id number]  Admission Date: 6/16/2020  Hospital Length of Stay: 1 days  Code Status: Full Code   Attending Provider: Hugh Lopez MD   Consulting Provider: Garry Lopez MD  Primary Care Physician: Isis Richter DO  Principal Problem:Symptomatic anemia    Inpatient consult to Gastroenterology  Consult performed by: Garry Lopez MD  Consult ordered by: Nadeen Fischer MD        Subjective:     HPI: Luiza Childers is a 87 y.o. female with history of DM, HTN, on asa 81 daily, was transferred from the cardiology clinic for evaluation of anemia.     History obtained by primary team and patient's son(talked to him over the phone) (patient has mild cognitive impairement). Over the last 1-2 weeks patient has increased shortness of breath. No reports of blood in stool, black stool, BRBPR, dysphagia, odynophagia. She does not take NSAIDS at home. She presented to the cardiology clinic, labs were drawn: Hb was fount ot be 6 (down from 11 2 months ago which is her baseline) so patient was transferred to the ED for further evaluation.       Upon arrival patient is vitally stable, afebrile, was given 2 units or pRbc, hb this morning is 8.1, iron studies show NHI, BUN 41, cr 1.4, no previous EGD/colonoscopy in our system. Son reports if she had a colonoscopy, it was at least 30 years ago.        Past Medical History:   Diagnosis Date    Diabetes mellitus, type 2     Hypertension     Syncope and collapse        Past Surgical History:   Procedure Laterality Date    CARDIAC PACEMAKER PLACEMENT      CATARACT EXTRACTION      HYSTERECTOMY         Family History   Family history unknown: Yes       Social History     Socioeconomic History    Marital status:      Spouse name: Not on file    Number of children: Not on file    Years of education: Not on file    Highest education level: Not on  file   Occupational History    Not on file   Social Needs    Financial resource strain: Not hard at all    Food insecurity     Worry: Never true     Inability: Never true    Transportation needs     Medical: No     Non-medical: No   Tobacco Use    Smoking status: Former Smoker     Years: 25.00     Quit date: 1974     Years since quittin.0    Smokeless tobacco: Never Used   Substance and Sexual Activity    Alcohol use: No     Alcohol/week: 0.0 standard drinks     Frequency: Monthly or less     Drinks per session: 1 or 2     Binge frequency: Never    Drug use: No    Sexual activity: Not Currently   Lifestyle    Physical activity     Days per week: 0 days     Minutes per session: 10 min    Stress: To some extent   Relationships    Social connections     Talks on phone: Three times a week     Gets together: Once a week     Attends Yarsani service: Not on file     Active member of club or organization: No     Attends meetings of clubs or organizations: Never     Relationship status:    Other Topics Concern    Not on file   Social History Narrative    2 kids, son local, dtr in [place].    .    Lives alone, she drives, memory [place].       No current facility-administered medications on file prior to encounter.      Current Outpatient Medications on File Prior to Encounter   Medication Sig Dispense Refill    blood sugar diagnostic (ACCU-CHEK GERMÁN PLUS TEST STRP) Strp USE ONE STRIP TO TEST DAILY. 100 strip 11    alpha lipoic acid 300 mg Cap Take by mouth.      aspirin (ECOTRIN) 81 MG EC tablet Take 81 mg by mouth once daily.      blood sugar diagnostic (ACCU-CHEK GERMÁN PLUS TEST STRP) Strp USE ONE STRIP TO TEST DAILY. 300 strip 1    cholecalciferol, vitamin D3, (VITAMIN D3) 5,000 unit Tab Take 10,000 Units by mouth once daily.      co-enzyme Q-10 30 mg capsule Take 30 mg by mouth once daily.       cranberry 400 mg Cap Take by mouth.      diclofenac sodium (VOLTAREN) 1 % Gel Apply 2 g  topically once daily. 100 g 3    ezetimibe (ZETIA) 10 mg tablet Take 1 tablet (10 mg total) by mouth once daily. 30 tablet 6    lancets (ONETOUCH DELICA LANCETS) 33 gauge Misc Check fasting glucose daily 300 each 3    lancets Misc Check glucose daily 100 each 3    TURMERIC ORAL Take by mouth.      vit A/vit C/vit E/zinc/copper (OCUVITE PRESERVISION ORAL) Take by mouth.         Review of patient's allergies indicates:  No Known Allergies    Review of Systems   Unable to perform ROS: Dementia   Constitutional: Positive for malaise/fatigue. Negative for chills, fever and weight loss.   HENT: Negative for hearing loss and sore throat.    Eyes: Negative for blurred vision and double vision.   Skin: Negative for itching and rash.        Objective:     Vitals:    06/17/20 0438   BP: (!) 186/78   Pulse: 69   Resp: 20   Temp: 96.3 °F (35.7 °C)         Physical Exam  Vitals signs and nursing note reviewed.   Constitutional:       General: She is not in acute distress.     Appearance: Normal appearance. She is not diaphoretic.   HENT:      Head: Normocephalic and atraumatic.   Eyes:      General: No scleral icterus.     Conjunctiva/sclera: Conjunctivae normal.   Neck:      Musculoskeletal: Neck supple.   Cardiovascular:      Rate and Rhythm: Normal rate and regular rhythm.   Pulmonary:      Effort: Pulmonary effort is normal.   Abdominal:      General: Bowel sounds are normal. There is no distension.      Palpations: Abdomen is soft. Abdomen is not rigid.      Tenderness: There is no abdominal tenderness. There is no guarding or rebound.   Musculoskeletal:         General: No tenderness.   Lymphadenopathy:      Cervical: No cervical adenopathy.   Skin:     General: Skin is warm and dry.      Capillary Refill: Capillary refill takes less than 2 seconds.      Coloration: Skin is pale.   Neurological:      Mental Status: She is alert and oriented to person, place, and time.          Significant Labs:  Recent Labs   Lab  06/16/20  1500 06/16/20  1920 06/17/20  0419   HGB 6.0* 6.1* 8.1*       Lab Results   Component Value Date    WBC 8.16 06/17/2020    HGB 8.1 (L) 06/17/2020    HCT 26.7 (L) 06/17/2020    MCV 95 06/17/2020     06/17/2020       Lab Results   Component Value Date     06/17/2020    K 4.3 06/17/2020     06/17/2020    CO2 22 (L) 06/17/2020    BUN 36 (H) 06/17/2020    CREATININE 1.3 06/17/2020    CALCIUM 8.7 06/17/2020    ANIONGAP 7 (L) 06/17/2020    ESTGFRAFRICA 42.6 (A) 06/17/2020    EGFRNONAA 37.0 (A) 06/17/2020       Lab Results   Component Value Date    ALT 14 06/17/2020    AST 21 06/17/2020    ALKPHOS 40 (L) 06/17/2020    BILITOT 1.1 (H) 06/17/2020       Lab Results   Component Value Date    INR 1.1 03/14/2016       Significant Imaging:  Reviewed pertinent radiology findings.       Assessment/Plan:     Luiza Childers is a 87 y.o. female with history of DM, HTN, on asa 81 daily, was transferred from the cardiology clinic for evaluation of anemia.     History obtained by primary team and patient's son(talked to him over the phone) (patient has mild cognitive impairement). Over the last 1-2 weeks patient has increased shortness of breath. No reports of blood in stool, black stool, BRBPR, dysphagia, odynophagia. She does not take NSAIDS at home. She presented to the cardiology clinic, labs were drawn: Hb was fount ot be 6 (down from 11 2 months ago which is her baseline) so patient was transferred to the ED for further evaluation.       Upon arrival patient is vitally stable, afebrile, was given 2 units or pRbc, hb this morning is 8.1, iron studies show NHI, BUN 41, cr 1.4, no previous EGD/colonoscopy in our system. Son reports if she had a colonoscopy, it was at least 30 years ago.      Problem List:  1. Symptomatic anemia  2. NHI  3. Takes asa 81 daily    Plan:  1. Clear liquid diet today  2. Colon prep ordered  3. NPO from midnight for EGD/colonoscopy 6/18     Thank you for involving us in the  care of Luiza Childers. Please call with any additional questions, concerns or changes in the patient's clinical status.    Garry Lopez MD  Gastroenterology Fellow PGY IV   [facility]

## 2020-06-17 NOTE — H&P
Hospital Medicine  History and Physical  [facility]      Patient Name: Luiza Childers  MRN:  [id number]  Hospital Medicine Team: [facility]OS[facility] MED R Nadeen Fischer MD  Date of Admission:  6/16/2020     Principal Problem:  Symptomatic anemia   Primary Care Physician: Isis Richter DO       History of Present Illness:    Ms. Luiza Childers is a 87 y.o. female who presents to the ER from Cardiology clinic for evaluation of anemia.  History is obtained from the son, as the patient has mild cognitive impairment (not oriented to month or year at baseline).  He mentions that over the last 1-2 weeks, he has noticed that she has been having more dyspnea on exertion.  He mentions that doing light activity such as putting clothes into the laundry caused her to be very short of breath.  He denies any shortness of breath at rest.  He endorses decreased PO intake.  He took her BP, and found it to be low, so her Lisinopril 40mg was stopped.  She denies any dark stools or abdominal pain.  She was previously taking Aspirin 81mg three times a week.  He denies any fevers or chills.  Given her symptoms, they went to Cardiology clinic for evaluation.  Labs performed showed a Hemoglobin 6 down from 11 two months ago.  She was sent to the ER for further evaluation.    Upon arrival to the ER, vitals were temp 98.9F, HR 65 and /67.  Labs confirmed a Hemoglobin 6.  She was transfused 2 units PRBCs.  She was admitted to Hospital Medicine for further management.        Review of Systems:  Constitutional: Negative for chills, fever, fatigue, weakness  HENT: Negative for sore throat, trouble swallowing.    Eyes: Negative for photophobia, visual disturbance.   Respiratory: + shortness of breath.    Cardiovascular: Negative for chest pain, palpitations, leg swelling.   Gastrointestinal: Negative for abdominal pain, nausea, vomiting, diarrhea, constipation  Endocrine: Negative for cold intolerance, heat  intolerance.   Genitourinary: Negative for dysuria, frequency.   Musculoskeletal: Negative for arthralgias, myalgias.   Skin: Negative for rash, wound, erythema   Neurological: Negative for numbness, paresthesias  Psychiatric/Behavioral: Negative for confusion, hallucinations, anxiety  All other systems reviewed and are negative.      Past Medical History: Patient has a past medical history of Diabetes mellitus, type 2, Hypertension, and Syncope and collapse.      Past Surgical History: Patient has a past surgical history that includes Hysterectomy; Cataract extraction; and Cardiac pacemaker placement.      Social History: Patient reports that she quit smoking about 46 years ago. She quit after 25.00 years of use. She has never used smokeless tobacco. She reports that she does not drink alcohol or use drugs.      Family History: Patient's Family history is unknown by patient.      Medications: Scheduled Meds:   pantoprazole  40 mg Intravenous BID     Continuous Infusions:  PRN Meds:.sodium chloride, acetaminophen, dextrose 50%, dextrose 50%, dextrose 50%, dextrose 50%, glucagon (human recombinant), glucagon (human recombinant), glucose, glucose, glucose, glucose, HYDROcodone-acetaminophen, HYDROcodone-acetaminophen, insulin aspart U-100, melatonin, ondansetron, promethazine (PHENERGAN) IVPB, senna-docusate 8.6-50 mg, sodium chloride 0.9%      Allergies: Patient has No Known Allergies.      Physical Exam:    Temp:  [98.2 °F (36.8 °C)-98.9 °F (37.2 °C)]   Pulse:  [60-66]   Resp:  []   BP: (123-170)/(60-83)   SpO2:  [16 %-100 %]     Constitutional: Appears well developed and well nourished  Head: Normocephalic and atraumatic.   Mouth/Throat: Oropharynx is clear and moist.   Eyes: EOM are normal. Pupils are equal, round, and reactive to light. No scleral icterus.   Neck: Normal range of motion. Neck supple.   Cardiovascular: Normal heart rate.  Regular heart rhythm.  No murmur heard.  Pulmonary/Chest: Effort  normal. No respiratory distress. No wheezes, rales, or rhonchi  Abdominal: Soft. Bowel sounds are normal.  No distension.  No tenderness  Musculoskeletal: Normal range of motion. No edema.   Neurological: Alert and oriented to person and place.  Skin: Skin is warm and dry.   Psychiatric: Normal mood and affect. Behavior is normal.         Intake/Output Summary (Last 24 hours) at 6/16/2020 2220  Last data filed at 6/16/2020 2112  Gross per 24 hour   Intake 217 ml   Output --   Net 217 ml     Recent Labs   Lab 06/16/20  1500 06/16/20  1920   WBC 6.87 7.48   HGB 6.0* 6.1*   HCT 20.0* 20.2*    251     Recent Labs   Lab 06/16/20  1500   *   K 4.9      CO2 22*   BUN 40*   CREATININE 1.4   *   CALCIUM 9.6   MG 2.0   PHOS 4.3     Recent Labs   Lab 06/16/20  1500   ALKPHOS 42*   ALT 11   AST 13   ALBUMIN 3.8   PROT 6.8   BILITOT 0.3      Recent Labs     06/16/20  1500   LACTATE 0.9      No results for input(s): CPK, CPKMB, MB, TROPONINI in the last 72 hours.      Assessment and Plan:    Ms. Luiza Childers is a 87 y.o. female who presented to [facility] on 6/16/2020 with symptomatic anemia.    Symptomatic Anemia  GI Hemorrhage  Acute Blood Loss Anemia  · GIB Pathway initiated  · Hgb 6 on admit, baseline around 11  · Start Protonix 40mg IV BID  · GI consulted, appreciate assistance  · NPO at midnight for possible EGD  · Type and screen, blood consent obtained  · Transfuse 2 units PRBCs  · Check iron studies and hemolysis labs    Type 2 DM without Long Term Insulin Use  · HbA1c pending  · Home DM regimen:  Zetia 10mg daily  · SSI with POCT accuchecks and Diabetic diet    CKD Stage 3  · Chronic and stable  · Montior    Mild Cognitive Impairment  · Oriented to person and place at baseline     Diet:  Diabetic then NPO at midnight  VTE PPx:  Holding with anemia  Goals of Care:  Full      Disposition:  Pending GI recs and anemia  Discharge Needs:  TBD      Nadeen Fischer MD  [facility]  Cell:   [phone]  Spectra:  [id number]

## 2020-06-17 NOTE — ED NOTES
Telemetry Verification   Patient placed on Telemetry Box  Verified with War Room  Box # [id number]   Monitor Tech    Rate 65   Rhythm Normal sinus

## 2020-06-17 NOTE — H&P (VIEW-ONLY)
[facility]  Gastroenterology  Consult Note    Patient Name: Luiza Childers  MRN: [id number]  Admission Date: 6/16/2020  Hospital Length of Stay: 1 days  Code Status: Full Code   Attending Provider: Hugh Lopez MD   Consulting Provider: Garry Lopez MD  Primary Care Physician: Isis Richter DO  Principal Problem:Symptomatic anemia    Inpatient consult to Gastroenterology  Consult performed by: Garry Lopez MD  Consult ordered by: Nadeen Fischer MD        Subjective:     HPI: Luiza Childers is a 87 y.o. female with history of DM, HTN, on asa 81 daily, was transferred from the cardiology clinic for evaluation of anemia.     History obtained by primary team and patient's son(talked to him over the phone) (patient has mild cognitive impairement). Over the last 1-2 weeks patient has increased shortness of breath. No reports of blood in stool, black stool, BRBPR, dysphagia, odynophagia. She does not take NSAIDS at home. She presented to the cardiology clinic, labs were drawn: Hb was fount ot be 6 (down from 11 2 months ago which is her baseline) so patient was transferred to the ED for further evaluation.       Upon arrival patient is vitally stable, afebrile, was given 2 units or pRbc, hb this morning is 8.1, iron studies show NHI, BUN 41, cr 1.4, no previous EGD/colonoscopy in our system. Son reports if she had a colonoscopy, it was at least 30 years ago.        Past Medical History:   Diagnosis Date    Diabetes mellitus, type 2     Hypertension     Syncope and collapse        Past Surgical History:   Procedure Laterality Date    CARDIAC PACEMAKER PLACEMENT      CATARACT EXTRACTION      HYSTERECTOMY         Family History   Family history unknown: Yes       Social History     Socioeconomic History    Marital status:      Spouse name: Not on file    Number of children: Not on file    Years of education: Not on file    Highest education level: Not on  file   Occupational History    Not on file   Social Needs    Financial resource strain: Not hard at all    Food insecurity     Worry: Never true     Inability: Never true    Transportation needs     Medical: No     Non-medical: No   Tobacco Use    Smoking status: Former Smoker     Years: 25.00     Quit date: 1974     Years since quittin.0    Smokeless tobacco: Never Used   Substance and Sexual Activity    Alcohol use: No     Alcohol/week: 0.0 standard drinks     Frequency: Monthly or less     Drinks per session: 1 or 2     Binge frequency: Never    Drug use: No    Sexual activity: Not Currently   Lifestyle    Physical activity     Days per week: 0 days     Minutes per session: 10 min    Stress: To some extent   Relationships    Social connections     Talks on phone: Three times a week     Gets together: Once a week     Attends Orthodox service: Not on file     Active member of club or organization: No     Attends meetings of clubs or organizations: Never     Relationship status:    Other Topics Concern    Not on file   Social History Narrative    2 kids, son local, dtr in [place].    .    Lives alone, she drives, memory OK.       No current facility-administered medications on file prior to encounter.      Current Outpatient Medications on File Prior to Encounter   Medication Sig Dispense Refill    blood sugar diagnostic (ACCU-CHEK GERMÁN PLUS TEST STRP) Strp USE ONE STRIP TO TEST DAILY. 100 strip 11    alpha lipoic acid 300 mg Cap Take by mouth.      aspirin (ECOTRIN) 81 MG EC tablet Take 81 mg by mouth once daily.      blood sugar diagnostic (ACCU-CHEK GERMÁN PLUS TEST STRP) Strp USE ONE STRIP TO TEST DAILY. 300 strip 1    cholecalciferol, vitamin D3, (VITAMIN D3) 5,000 unit Tab Take 10,000 Units by mouth once daily.      co-enzyme Q-10 30 mg capsule Take 30 mg by mouth once daily.       cranberry 400 mg Cap Take by mouth.      diclofenac sodium (VOLTAREN) 1 % Gel Apply 2 g  topically once daily. 100 g 3    ezetimibe (ZETIA) 10 mg tablet Take 1 tablet (10 mg total) by mouth once daily. 30 tablet 6    lancets (ONETOUCH DELICA LANCETS) 33 gauge Misc Check fasting glucose daily 300 each 3    lancets Misc Check glucose daily 100 each 3    TURMERIC ORAL Take by mouth.      vit A/vit C/vit E/zinc/copper (OCUVITE PRESERVISION ORAL) Take by mouth.         Review of patient's allergies indicates:  No Known Allergies    Review of Systems   Unable to perform ROS: Dementia   Constitutional: Positive for malaise/fatigue. Negative for chills, fever and weight loss.   HENT: Negative for hearing loss and sore throat.    Eyes: Negative for blurred vision and double vision.   Skin: Negative for itching and rash.        Objective:     Vitals:    06/17/20 0438   BP: (!) 186/78   Pulse: 69   Resp: 20   Temp: 96.3 °F (35.7 °C)         Physical Exam  Vitals signs and nursing note reviewed.   Constitutional:       General: She is not in acute distress.     Appearance: Normal appearance. She is not diaphoretic.   HENT:      Head: Normocephalic and atraumatic.   Eyes:      General: No scleral icterus.     Conjunctiva/sclera: Conjunctivae normal.   Neck:      Musculoskeletal: Neck supple.   Cardiovascular:      Rate and Rhythm: Normal rate and regular rhythm.   Pulmonary:      Effort: Pulmonary effort is normal.   Abdominal:      General: Bowel sounds are normal. There is no distension.      Palpations: Abdomen is soft. Abdomen is not rigid.      Tenderness: There is no abdominal tenderness. There is no guarding or rebound.   Musculoskeletal:         General: No tenderness.   Lymphadenopathy:      Cervical: No cervical adenopathy.   Skin:     General: Skin is warm and dry.      Capillary Refill: Capillary refill takes less than 2 seconds.      Coloration: Skin is pale.   Neurological:      Mental Status: She is alert and oriented to person, place, and time.          Significant Labs:  Recent Labs   Lab  06/16/20  1500 06/16/20  1920 06/17/20  0419   HGB 6.0* 6.1* 8.1*       Lab Results   Component Value Date    WBC 8.16 06/17/2020    HGB 8.1 (L) 06/17/2020    HCT 26.7 (L) 06/17/2020    MCV 95 06/17/2020     06/17/2020       Lab Results   Component Value Date     06/17/2020    K 4.3 06/17/2020     06/17/2020    CO2 22 (L) 06/17/2020    BUN 36 (H) 06/17/2020    CREATININE 1.3 06/17/2020    CALCIUM 8.7 06/17/2020    ANIONGAP 7 (L) 06/17/2020    ESTGFRAFRICA 42.6 (A) 06/17/2020    EGFRNONAA 37.0 (A) 06/17/2020       Lab Results   Component Value Date    ALT 14 06/17/2020    AST 21 06/17/2020    ALKPHOS 40 (L) 06/17/2020    BILITOT 1.1 (H) 06/17/2020       Lab Results   Component Value Date    INR 1.1 03/14/2016       Significant Imaging:  Reviewed pertinent radiology findings.       Assessment/Plan:     Luiza Childers is a 87 y.o. female with history of DM, HTN, on asa 81 daily, was transferred from the cardiology clinic for evaluation of anemia.     History obtained by primary team and patient's son(talked to him over the phone) (patient has mild cognitive impairement). Over the last 1-2 weeks patient has increased shortness of breath. No reports of blood in stool, black stool, BRBPR, dysphagia, odynophagia. She does not take NSAIDS at home. She presented to the cardiology clinic, labs were drawn: Hb was fount ot be 6 (down from 11 2 months ago which is her baseline) so patient was transferred to the ED for further evaluation.       Upon arrival patient is vitally stable, afebrile, was given 2 units or pRbc, hb this morning is 8.1, iron studies show NHI, BUN 41, cr 1.4, no previous EGD/colonoscopy in our system. Son reports if she had a colonoscopy, it was at least 30 years ago.      Problem List:  1. Symptomatic anemia  2. NHI  3. Takes asa 81 daily    Plan:  1. Clear liquid diet today  2. Colon prep ordered  3. NPO from midnight for EGD/colonoscopy 6/18     Thank you for involving us in the  care of Luiza Childers. Please call with any additional questions, concerns or changes in the patient's clinical status.    Garry Lopez MD  Gastroenterology Fellow PGY IV   [facility]

## 2020-06-17 NOTE — ED PROVIDER NOTES
Encounter Date: 2020       History     Chief Complaint   Patient presents with    Abnormal Lab     low h/h sent here for transfusion and admission     87-year-old female to the ER for evaluation of symptomatic anemia.  The patient was seen in cardiology clinic today.  Routine labs performed revealed a hemoglobin drop from 11-6 over the past 2 months.  The patient is symptomatic with shortness breath.  She denies any obvious bleeding.  She denies any dark or black stools.  She has never experienced blood loss like this before.  She has never had a blood transfusion.  She appears well and nontoxic.  She presents with her son.  Both are medically literate.        Review of patient's allergies indicates:  No Known Allergies  Past Medical History:   Diagnosis Date    Diabetes mellitus, type 2     Hypertension     Syncope and collapse      Past Surgical History:   Procedure Laterality Date    CARDIAC PACEMAKER PLACEMENT      CATARACT EXTRACTION      HYSTERECTOMY       Family History   Family history unknown: Yes     Social History     Tobacco Use    Smoking status: Former Smoker     Years: 25.00     Quit date: 1974     Years since quittin.0    Smokeless tobacco: Never Used   Substance Use Topics    Alcohol use: No     Alcohol/week: 0.0 standard drinks     Frequency: Monthly or less     Drinks per session: 1 or 2     Binge frequency: Never    Drug use: No     Review of Systems   Constitutional: Negative for fever.   HENT: Negative for sore throat.    Respiratory: Positive for shortness of breath.    Cardiovascular: Negative for chest pain.   Gastrointestinal: Negative for nausea.   Genitourinary: Negative for dysuria.   Musculoskeletal: Negative for back pain.   Skin: Negative for rash.   Neurological: Negative for weakness.   Hematological: Does not bruise/bleed easily.       Physical Exam     Initial Vitals [20 1746]   BP Pulse Resp Temp SpO2   (!) 147/67 65 18 98.9 °F (37.2 °C) 99 %       MAP       --         Physical Exam    Constitutional: Vital signs are normal. She appears well-developed and well-nourished. She is not diaphoretic. No distress.   HENT:   Head: Normocephalic and atraumatic.   Right Ear: Hearing and external ear normal.   Left Ear: Hearing and external ear normal.   Eyes: Conjunctivae are normal.   Cardiovascular: Normal rate and regular rhythm. Exam reveals no gallop and no friction rub.    No murmur heard.  Pulmonary/Chest:   The lungs are clear bilaterally with good air movement   Abdominal: Soft. Normal appearance and bowel sounds are normal.   Musculoskeletal: Normal range of motion.   Neurological: She is alert and oriented to person, place, and time.   Skin: Skin is warm and intact.   Psychiatric: She has a normal mood and affect. Her speech is normal and behavior is normal. Cognition and memory are normal.         ED Course   Critical Care    Date/Time: 6/16/2020 10:50 PM  Performed by: Gutierrez Che PA-C  Authorized by: Hugh Lopez MD   Direct patient critical care time: 10 minutes  Additional history critical care time: 5 minutes  Ordering / reviewing critical care time: 5 minutes  Documentation critical care time: 10 minutes  Consulting other physicians critical care time: 5 minutes  Consult with family critical care time: 0 minutes  Total critical care time (exclusive of procedural time) : 35 minutes  Critical care was necessary to treat or prevent imminent or life-threatening deterioration of the following conditions: Symptomatic anemia.  Critical care was time spent personally by me on the following activities: examination of patient, pulse oximetry and review of old charts.        Labs Reviewed   CBC W/ AUTO DIFFERENTIAL   TYPE & SCREEN   PREPARE RBC SOFT          Imaging Results    None          Medical Decision Making:   History:   I obtained history from: someone other than patient.  Old Medical Records: I decided to obtain old medical records.  Old  Records Summarized: records from clinic visits.  Initial Assessment:   87-year-old female with shortness of breath and anemia  Differential Diagnosis:   Anemia of chronic disease, GI bleed,   Clinical Tests:   Lab Tests: Ordered and Reviewed  ED Management:  Plan:  Repeat CBC.   If hemoglobin is low I intend to transfuse and will discuss and admit to hospital medicine  No signs of GI bleed on my exam.  The patient appears hemodynamically stable at this time. No recent trauma or injury.   Repeat hemoglobin 6.1.  No signs of bleeding on my exam.  Patient has been consented for blood transfusion.  I will begin transfusing in the ED and admit to hospital medicine.  Other:   I discussed test(s) with the performing physician.  I have discussed this case with another health care provider.                                 Clinical Impression:       ICD-10-CM ICD-9-CM   1. Symptomatic anemia  D64.9 285.9         Disposition:   Disposition: Admitted  Condition: Stable                        Gutierrez Che PA-C  06/16/20 [id number]

## 2020-06-17 NOTE — PLAN OF CARE
Pt AAOx2, pt is disorientated to time and situation. Free of pain falls and injuries. BS and Tele monitoring maintained per orders. Pt Clear Liquid diet initiated and maintained per orders. Pt started Golytely per orders. Pt is to be NPO at midnight for EGD and colonoscopy 6/18.  Pt son remained at bedside to assist at night pt confusion and ensure pt drinks Golytely. Pt and son educated on Golytely and colonoscopy/EGD. See previous notes, will continue to monitor.     Problem: Fall Injury Risk  Goal: Absence of Fall and Fall-Related Injury  Outcome: Ongoing, Progressing  Intervention: Identify and Manage Contributors to Fall Injury Risk  Flowsheets (Taken 6/17/2020 1627)  Self-Care Promotion:   independence encouraged   BADL personal objects within reach   BADL personal routines maintained   meal setup provided   safe use of adaptive equipment encouraged  Medication Review/Management:   medications reviewed   high risk medications identified  Intervention: Promote Injury-Free Environment  Flowsheets (Taken 6/17/2020 1627)  Safety Promotion/Fall Prevention:   assistive device/personal item within reach   bed alarm set   commode/urinal/bedpan at bedside   diversional activities provided   Fall Risk reviewed with patient/family   Fall Risk signage in place   high risk medications identified   lighting adjusted   medications reviewed   nonskid shoes/socks when out of bed   side rails raised x 2   instructed to call staff for mobility  Environmental Safety Modification:   assistive device/personal items within reach   clutter free environment maintained   lighting adjusted   room organization consistent     Problem: Adult Inpatient Plan of Care  Goal: Plan of Care Review  Outcome: Ongoing, Progressing  Flowsheets (Taken 6/17/2020 1627)  Plan of Care Reviewed With: patient  Goal: Patient-Specific Goal (Individualization)  Outcome: Ongoing, Progressing  Goal: Absence of Hospital-Acquired Illness or  Injury  Outcome: Ongoing, Progressing  Intervention: Identify and Manage Fall Risk  Flowsheets (Taken 6/17/2020 1627)  Safety Promotion/Fall Prevention:   assistive device/personal item within reach   bed alarm set   commode/urinal/bedpan at bedside   diversional activities provided   Fall Risk reviewed with patient/family   Fall Risk signage in place   high risk medications identified   lighting adjusted   medications reviewed   nonskid shoes/socks when out of bed   side rails raised x 2   instructed to call staff for mobility  Intervention: Prevent VTE (venous thromboembolism)  Flowsheets (Taken 6/17/2020 1627)  VTE Prevention/Management:   ambulation promoted   bleeding precautions maintained   bleeding risk assessed   bleeding risk factor(s) identified, provider notified   fluids promoted   prepared for procedure/surgery  Goal: Optimal Comfort and Wellbeing  Outcome: Ongoing, Progressing  Intervention: Provide Person-Centered Care  Flowsheets (Taken 6/17/2020 1627)  Trust Relationship/Rapport:   care explained   choices provided   emotional support provided   empathic listening provided   questions answered   thoughts/feelings acknowledged   reassurance provided   questions encouraged  Goal: Readiness for Transition of Care  Outcome: Ongoing, Progressing  Goal: Rounds/Family Conference  Outcome: Ongoing, Progressing     Problem: Adjustment to Illness (Gastrointestinal Bleeding)  Goal: Optimal Coping with Acute Illness  Outcome: Ongoing, Progressing  Intervention: Optimize Psychosocial Response to Unexpected Illness  Flowsheets (Taken 6/17/2020 1627)  Supportive Measures:   active listening utilized   relaxation techniques promoted   self-care encouraged   verbalization of feelings encouraged     Problem: Bleeding (Gastrointestinal Bleeding)  Goal: Hemostasis  Outcome: Ongoing, Progressing  Intervention: Manage Gastrointestinal Bleeding  Flowsheets (Taken 6/17/2020  1634)  Environmental Support:   distractions minimized   personal routine supported   environmental consistency promoted     Problem: Diabetes Comorbidity  Goal: Blood Glucose Level Within Desired Range  Outcome: Ongoing, Progressing  Intervention: Maintain Glycemic Control  Flowsheets (Taken 6/17/2020 1634)  Glycemic Management: blood glucose monitoring

## 2020-06-18 ENCOUNTER — ANESTHESIA (OUTPATIENT)
Dept: ENDOSCOPY | Facility: HOSPITAL | Age: 85
DRG: 378 | End: 2020-06-18
Payer: MEDICARE

## 2020-06-18 VITALS
HEART RATE: 66 BPM | RESPIRATION RATE: 18 BRPM | SYSTOLIC BLOOD PRESSURE: 165 MMHG | DIASTOLIC BLOOD PRESSURE: 69 MMHG | OXYGEN SATURATION: 96 % | WEIGHT: 145.75 LBS | TEMPERATURE: 98 F | BODY MASS INDEX: 27.52 KG/M2 | HEIGHT: 61 IN

## 2020-06-18 PROBLEM — D64.9 SYMPTOMATIC ANEMIA: Status: RESOLVED | Noted: 2020-06-16 | Resolved: 2020-06-18

## 2020-06-18 LAB
ALBUMIN SERPL BCP-MCNC: 3.3 G/DL (ref 3.5–5.2)
ALP SERPL-CCNC: 76 U/L (ref 55–135)
ALT SERPL W/O P-5'-P-CCNC: 126 U/L (ref 10–44)
ANION GAP SERPL CALC-SCNC: 8 MMOL/L (ref 8–16)
AST SERPL-CCNC: 72 U/L (ref 10–40)
BASOPHILS # BLD AUTO: 0.01 K/UL (ref 0–0.2)
BASOPHILS NFR BLD: 0.2 % (ref 0–1.9)
BILIRUB SERPL-MCNC: 0.5 MG/DL (ref 0.1–1)
BUN SERPL-MCNC: 25 MG/DL (ref 8–23)
CALCIUM SERPL-MCNC: 8.5 MG/DL (ref 8.7–10.5)
CHLORIDE SERPL-SCNC: 107 MMOL/L (ref 95–110)
CO2 SERPL-SCNC: 23 MMOL/L (ref 23–29)
CREAT SERPL-MCNC: 1.1 MG/DL (ref 0.5–1.4)
DIFFERENTIAL METHOD: ABNORMAL
EOSINOPHIL # BLD AUTO: 0.3 K/UL (ref 0–0.5)
EOSINOPHIL NFR BLD: 5.8 % (ref 0–8)
ERYTHROCYTE [DISTWIDTH] IN BLOOD BY AUTOMATED COUNT: 14.7 % (ref 11.5–14.5)
EST. GFR  (AFRICAN AMERICAN): 52.2 ML/MIN/1.73 M^2
EST. GFR  (NON AFRICAN AMERICAN): 45.3 ML/MIN/1.73 M^2
GLUCOSE SERPL-MCNC: 110 MG/DL (ref 70–110)
HCT VFR BLD AUTO: 25.8 % (ref 37–48.5)
HGB BLD-MCNC: 8.3 G/DL (ref 12–16)
IMM GRANULOCYTES # BLD AUTO: 0.01 K/UL (ref 0–0.04)
IMM GRANULOCYTES NFR BLD AUTO: 0.2 % (ref 0–0.5)
LYMPHOCYTES # BLD AUTO: 0.6 K/UL (ref 1–4.8)
LYMPHOCYTES NFR BLD: 14.3 % (ref 18–48)
MAGNESIUM SERPL-MCNC: 1.8 MG/DL (ref 1.6–2.6)
MCH RBC QN AUTO: 29.4 PG (ref 27–31)
MCHC RBC AUTO-ENTMCNC: 32.2 G/DL (ref 32–36)
MCV RBC AUTO: 92 FL (ref 82–98)
MONOCYTES # BLD AUTO: 0.3 K/UL (ref 0.3–1)
MONOCYTES NFR BLD: 6.9 % (ref 4–15)
NEUTROPHILS # BLD AUTO: 3.3 K/UL (ref 1.8–7.7)
NEUTROPHILS NFR BLD: 72.6 % (ref 38–73)
NRBC BLD-RTO: 0 /100 WBC
PHOSPHATE SERPL-MCNC: 3.1 MG/DL (ref 2.7–4.5)
PLATELET # BLD AUTO: 185 K/UL (ref 150–350)
PMV BLD AUTO: 11 FL (ref 9.2–12.9)
POCT GLUCOSE: 128 MG/DL (ref 70–110)
POCT GLUCOSE: 136 MG/DL (ref 70–110)
POCT GLUCOSE: 157 MG/DL (ref 70–110)
POCT GLUCOSE: 160 MG/DL (ref 70–110)
POTASSIUM SERPL-SCNC: 3.6 MMOL/L (ref 3.5–5.1)
PROT SERPL-MCNC: 5.9 G/DL (ref 6–8.4)
RBC # BLD AUTO: 2.82 M/UL (ref 4–5.4)
SODIUM SERPL-SCNC: 138 MMOL/L (ref 136–145)
WBC # BLD AUTO: 4.49 K/UL (ref 3.9–12.7)

## 2020-06-18 PROCEDURE — 85025 COMPLETE CBC W/AUTO DIFF WBC: CPT

## 2020-06-18 PROCEDURE — 45378 DIAGNOSTIC COLONOSCOPY: CPT | Mod: ,,, | Performed by: INTERNAL MEDICINE

## 2020-06-18 PROCEDURE — 37000008 HC ANESTHESIA 1ST 15 MINUTES: Performed by: INTERNAL MEDICINE

## 2020-06-18 PROCEDURE — 43235 EGD DIAGNOSTIC BRUSH WASH: CPT | Mod: 51,,, | Performed by: INTERNAL MEDICINE

## 2020-06-18 PROCEDURE — 99239 HOSP IP/OBS DSCHRG MGMT >30: CPT | Mod: ,,, | Performed by: INTERNAL MEDICINE

## 2020-06-18 PROCEDURE — 43235 PR EGD, FLEX, DIAGNOSTIC: ICD-10-PCS | Mod: 51,,, | Performed by: INTERNAL MEDICINE

## 2020-06-18 PROCEDURE — 94761 N-INVAS EAR/PLS OXIMETRY MLT: CPT

## 2020-06-18 PROCEDURE — D9220A PRA ANESTHESIA: Mod: CRNA,,, | Performed by: NURSE ANESTHETIST, CERTIFIED REGISTERED

## 2020-06-18 PROCEDURE — D9220A PRA ANESTHESIA: ICD-10-PCS | Mod: ANES,,, | Performed by: ANESTHESIOLOGY

## 2020-06-18 PROCEDURE — C9113 INJ PANTOPRAZOLE SODIUM, VIA: HCPCS | Performed by: HOSPITALIST

## 2020-06-18 PROCEDURE — 63600175 PHARM REV CODE 636 W HCPCS: Performed by: NURSE ANESTHETIST, CERTIFIED REGISTERED

## 2020-06-18 PROCEDURE — D9220A PRA ANESTHESIA: ICD-10-PCS | Mod: CRNA,,, | Performed by: NURSE ANESTHETIST, CERTIFIED REGISTERED

## 2020-06-18 PROCEDURE — 83735 ASSAY OF MAGNESIUM: CPT

## 2020-06-18 PROCEDURE — 36415 COLL VENOUS BLD VENIPUNCTURE: CPT

## 2020-06-18 PROCEDURE — 37000009 HC ANESTHESIA EA ADD 15 MINS: Performed by: INTERNAL MEDICINE

## 2020-06-18 PROCEDURE — 43235 EGD DIAGNOSTIC BRUSH WASH: CPT | Performed by: INTERNAL MEDICINE

## 2020-06-18 PROCEDURE — 63600175 PHARM REV CODE 636 W HCPCS: Performed by: HOSPITALIST

## 2020-06-18 PROCEDURE — 99239 PR HOSPITAL DISCHARGE DAY,>30 MIN: ICD-10-PCS | Mod: ,,, | Performed by: INTERNAL MEDICINE

## 2020-06-18 PROCEDURE — 45378 DIAGNOSTIC COLONOSCOPY: CPT | Performed by: INTERNAL MEDICINE

## 2020-06-18 PROCEDURE — 25000003 PHARM REV CODE 250: Performed by: NURSE ANESTHETIST, CERTIFIED REGISTERED

## 2020-06-18 PROCEDURE — D9220A PRA ANESTHESIA: Mod: ANES,,, | Performed by: ANESTHESIOLOGY

## 2020-06-18 PROCEDURE — 84100 ASSAY OF PHOSPHORUS: CPT

## 2020-06-18 PROCEDURE — 45378 PR COLONOSCOPY,DIAGNOSTIC: ICD-10-PCS | Mod: ,,, | Performed by: INTERNAL MEDICINE

## 2020-06-18 PROCEDURE — 80053 COMPREHEN METABOLIC PANEL: CPT

## 2020-06-18 RX ORDER — LIDOCAINE HYDROCHLORIDE 20 MG/ML
INJECTION INTRAVENOUS
Status: DISCONTINUED | OUTPATIENT
Start: 2020-06-18 | End: 2020-06-18

## 2020-06-18 RX ORDER — SODIUM CHLORIDE 0.9 % (FLUSH) 0.9 %
10 SYRINGE (ML) INJECTION
Status: DISCONTINUED | OUTPATIENT
Start: 2020-06-18 | End: 2020-06-18 | Stop reason: HOSPADM

## 2020-06-18 RX ORDER — PROPOFOL 10 MG/ML
VIAL (ML) INTRAVENOUS
Status: DISCONTINUED | OUTPATIENT
Start: 2020-06-18 | End: 2020-06-18

## 2020-06-18 RX ORDER — SODIUM CHLORIDE 9 MG/ML
INJECTION, SOLUTION INTRAVENOUS CONTINUOUS PRN
Status: DISCONTINUED | OUTPATIENT
Start: 2020-06-18 | End: 2020-06-18

## 2020-06-18 RX ORDER — ONDANSETRON 2 MG/ML
INJECTION INTRAMUSCULAR; INTRAVENOUS
Status: DISCONTINUED | OUTPATIENT
Start: 2020-06-18 | End: 2020-06-18

## 2020-06-18 RX ORDER — PROPOFOL 10 MG/ML
VIAL (ML) INTRAVENOUS CONTINUOUS PRN
Status: DISCONTINUED | OUTPATIENT
Start: 2020-06-18 | End: 2020-06-18

## 2020-06-18 RX ADMIN — PROPOFOL 175 MCG/KG/MIN: 10 INJECTION, EMULSION INTRAVENOUS at 01:06

## 2020-06-18 RX ADMIN — PANTOPRAZOLE SODIUM 40 MG: 40 INJECTION, POWDER, LYOPHILIZED, FOR SOLUTION INTRAVENOUS at 08:06

## 2020-06-18 RX ADMIN — PROPOFOL 20 MG: 10 INJECTION, EMULSION INTRAVENOUS at 01:06

## 2020-06-18 RX ADMIN — ONDANSETRON 4 MG: 2 INJECTION INTRAMUSCULAR; INTRAVENOUS at 01:06

## 2020-06-18 RX ADMIN — PROPOFOL 60 MG: 10 INJECTION, EMULSION INTRAVENOUS at 01:06

## 2020-06-18 RX ADMIN — LIDOCAINE HYDROCHLORIDE 100 MG: 20 INJECTION, SOLUTION INTRAVENOUS at 01:06

## 2020-06-18 RX ADMIN — PROPOFOL 30 MG: 10 INJECTION, EMULSION INTRAVENOUS at 01:06

## 2020-06-18 RX ADMIN — PROPOFOL 30 MG: 10 INJECTION, EMULSION INTRAVENOUS at 02:06

## 2020-06-18 RX ADMIN — SODIUM CHLORIDE: 0.9 INJECTION, SOLUTION INTRAVENOUS at 01:06

## 2020-06-18 NOTE — DISCHARGE INSTRUCTIONS
Call MD if any sign of recurrent bleeding.    Follow up with PCP within 1 week of discharge - any available provider.

## 2020-06-18 NOTE — PROGRESS NOTES
Contacted MD Pt is on GOLYETLY but says she feels constipated and son wants her to get fleet enema. MD ordered.

## 2020-06-18 NOTE — PROVATION PATIENT INSTRUCTIONS
Discharge Summary/Instructions after an Endoscopic Procedure  Patient Name: Luiza Childers  Patient MRN: [id number]  Patient YOB: 1933 Thursday, June 18, 2020  Toro Burgos MD  RESTRICTIONS:  During your procedure today, you received medications for sedation.  These   medications may affect your judgment, balance and coordination.  Therefore,   for 24 hours, you have the following restrictions:   - DO NOT drive a car, operate machinery, make legal/financial decisions,   sign important papers or drink alcohol.    ACTIVITY:  Today: no heavy lifting, straining or running due to procedural   sedation/anesthesia.  The following day: return to full activity including work.  DIET:  Eat and drink normally unless instructed otherwise.     TREATMENT FOR COMMON SIDE EFFECTS:  - Mild abdominal pain, nausea, belching, bloating or excessive gas:  rest,   eat lightly and use a heating pad.  - Sore Throat: treat with throat lozenges and/or gargle with warm salt   water.  - Because air was used during the procedure, expelling large amounts of air   from your rectum or belching is normal.  - If a bowel prep was taken, you may not have a bowel movement for 1-3 days.    This is normal.  SYMPTOMS TO WATCH FOR AND REPORT TO YOUR PHYSICIAN:  1. Abdominal pain or bloating, other than gas cramps.  2. Chest pain.  3. Back pain.  4. Signs of infection such as: chills or fever occurring within 24 hours   after the procedure.  5. Rectal bleeding, which would show as bright red, maroon, or black stools.   (A tablespoon of blood from the rectum is not serious, especially if   hemorrhoids are present.)  6. Vomiting.  7. Weakness or dizziness.  GO DIRECTLY TO THE NEAREST EMERGENCY ROOM IF YOU HAVE ANY OF THE FOLLOWING:      Difficulty breathing              Chills and/or fever over 101 F   Persistent vomiting and/or vomiting blood   Severe abdominal pain   Severe chest pain   Black, tarry stools   Bleeding- more than one  tablespoon   Any other symptom or condition that you feel may need urgent attention  Your doctor recommends these additional instructions:  If any biopsies were taken, your doctors clinic will contact you in 1 to 2   weeks with any results.  - Patient has a contact number available for emergencies.  The signs and   symptoms of potential delayed complications were discussed with the   patient.  Return to normal activities tomorrow.  Written discharge   instructions were provided to the patient.   - Discharge patient to home.   - Resume previous diet.   - Continue present medications.   For questions, problems or results please call your physician - Toro Burgos MD at Work:  [phone].  [facility], EMERGENCY ROOM PHONE NUMBER: [phone]  IF A COMPLICATION OR EMERGENCY SITUATION ARISES AND YOU ARE UNABLE TO REACH   YOUR PHYSICIAN - GO DIRECTLY TO THE EMERGENCY ROOM.  Toro Burgos MD  6/18/2020 1:49:15 PM  This report has been verified and signed electronically.  PROVATION

## 2020-06-18 NOTE — PLAN OF CARE
Pt AAOx2, VSS, no complaints of pain, free of falls and injuries. BS and Tele monitoring maintained per orders. Pt NPO diet maintained per orders. Pt had EGD and Colonoscopy done, nothing was found. Pt son is at the bedside, Telesitter maintained per orders. IV access removed, tele box removed per orders. D/C education done. Pt D/C home with son via wheel chair.  Problem: Fall Injury Risk  Goal: Absence of Fall and Fall-Related Injury  Outcome: Ongoing, Progressing  Intervention: Identify and Manage Contributors to Fall Injury Risk  Flowsheets (Taken 6/18/2020 1641)  Self-Care Promotion:   independence encouraged   BADL personal objects within reach   BADL personal routines maintained   safe use of adaptive equipment encouraged  Medication Review/Management:   medications reviewed   high risk medications identified  Intervention: Promote Injury-Free Environment  Flowsheets (Taken 6/18/2020 1641)  Safety Promotion/Fall Prevention:   assistive device/personal item within reach   bed alarm set   commode/urinal/bedpan at bedside   crib side rails raised x2   diversional activities provided   Fall Risk reviewed with patient/family   Fall Risk signage in place   family to remain at bedside   high risk medications identified   lighting adjusted   medications reviewed   nonskid shoes/socks when out of bed   /camera at bedside   instructed to call staff for mobility   side rails raised x 2  Environmental Safety Modification:   assistive device/personal items within reach   clutter free environment maintained   lighting adjusted   room organization consistent     Problem: Adult Inpatient Plan of Care  Goal: Plan of Care Review  Outcome: Ongoing, Progressing  Flowsheets (Taken 6/18/2020 1641)  Plan of Care Reviewed With:   patient   son  Goal: Patient-Specific Goal (Individualization)  Outcome: Ongoing, Progressing  Goal: Absence of Hospital-Acquired Illness or Injury  Outcome:  Ongoing, Progressing  Intervention: Identify and Manage Fall Risk  Flowsheets (Taken 6/18/2020 1641)  Safety Promotion/Fall Prevention:   assistive device/personal item within reach   bed alarm set   commode/urinal/bedpan at bedside   crib side rails raised x2   diversional activities provided   Fall Risk reviewed with patient/family   Fall Risk signage in place   family to remain at bedside   high risk medications identified   lighting adjusted   medications reviewed   nonskid shoes/socks when out of bed   /camera at bedside   instructed to call staff for mobility   side rails raised x 2  Intervention: Prevent VTE (venous thromboembolism)  Flowsheets (Taken 6/18/2020 1641)  VTE Prevention/Management:   ambulation promoted   bleeding precautions maintained   bleeding risk assessed   bleeding risk factor(s) identified, provider notified   prepared for procedure/surgery   fluids promoted  Goal: Optimal Comfort and Wellbeing  Outcome: Ongoing, Progressing  Intervention: Provide Person-Centered Care  Flowsheets (Taken 6/18/2020 1641)  Trust Relationship/Rapport:   care explained   choices provided   emotional support provided   empathic listening provided   questions answered   questions encouraged   thoughts/feelings acknowledged   reassurance provided  Goal: Readiness for Transition of Care  Outcome: Ongoing, Progressing  Goal: Rounds/Family Conference  Outcome: Ongoing, Progressing     Problem: Adjustment to Illness (Gastrointestinal Bleeding)  Goal: Optimal Coping with Acute Illness  Outcome: Ongoing, Progressing  Intervention: Optimize Psychosocial Response to Unexpected Illness  Flowsheets (Taken 6/18/2020 1641)  Supportive Measures:   active listening utilized   verbalization of feelings encouraged     Problem: Bleeding (Gastrointestinal Bleeding)  Goal: Hemostasis  Outcome: Ongoing, Progressing  Intervention: Manage Gastrointestinal Bleeding  Flowsheets (Taken  6/18/2020 1641)  Environmental Support:   calm environment promoted   environmental consistency promoted   distractions minimized     Problem: Diabetes Comorbidity  Goal: Blood Glucose Level Within Desired Range  Outcome: Ongoing, Progressing  Intervention: Maintain Glycemic Control  Flowsheets (Taken 6/18/2020 1641)  Glycemic Management: blood glucose monitoring

## 2020-06-18 NOTE — PLAN OF CARE
CM spoke with patient's son for discharge planning assessment due to patient's mentation. Per son, the patient lives with him in a house with 0 steps to enter. Per son, the patient did not use DME to ambulate, but was dependent with ADLs due to forgetfulness. Transport can be provided by the son. All questions answered. CM will continue to follow.      06/18/20 1139   Discharge Assessment   Assessment Type Discharge Planning Assessment   Confirmed/corrected address and phone number on facesheet? Yes   Assessment information obtained from? Other  (Son)   Expected Length of Stay (days) 4   Communicated expected length of stay with patient/caregiver yes   Prior to hospitilization cognitive status: Not Oriented to Person;Not Oriented to Place;Not Oriented to Time   Prior to hospitalization functional status: Independent   Current cognitive status: Not Oriented to Place;Not Oriented to Time;Not Oriented to Person   Current Functional Status: Needs Assistance   Lives With child(julio), adult   Able to Return to Prior Arrangements yes   Is patient able to care for self after discharge? No   Who are your caregiver(s) and their phone number(s)? Aashish Childers [phone]   Patient's perception of discharge disposition home or selfcare   Readmission Within the Last 30 Days no previous admission in last 30 days   Patient currently being followed by outpatient case management? No   Patient currently receives any other outside agency services? No   Equipment Currently Used at Home none   Is the patient taking medications as prescribed? yes   Does the patient have transportation home? Yes   Transportation Anticipated family or friend will provide   Does the patient receive services at the [facility]? No   Discharge Plan A Home with family   Discharge Plan B Home Health   DME Needed Upon Discharge  other (see comments)  (TBD)   Patient/Family in Agreement with Plan yes              PCP:  Isis Richter,  DO        Pharmacy:    [id number] [place] - [address]  [address]  [place] [address]  Phone: [phone] Fax: [phone]        Emergency Contacts:  Extended Emergency Contact Information  Primary Emergency Contact: Aashish Childers  Address: [address] [address] [place]  Mobile Phone: [phone]  Relation: Son      Insurance:    Payor: MEDICARE / Plan: MEDICARE PART A & B / Product Type: Government /       06/18/2020  11:44 AM    Mraiela Pardo RN, CM   Ext: [phone]

## 2020-06-18 NOTE — TRANSFER OF CARE
"Anesthesia Transfer of Care Note    Patient: Luiza Childers    Procedure(s) Performed: Procedure(s) (LRB):  EGD (ESOPHAGOGASTRODUODENOSCOPY) (N/A)  COLONOSCOPY (N/A)    Patient location: PACU    Anesthesia Type: general    Transport from OR: Transported from OR on room air with adequate spontaneous ventilation    Post pain: adequate analgesia    Post assessment: no apparent anesthetic complications and tolerated procedure well    Post vital signs: stable    Level of consciousness: sedated    Nausea/Vomiting: no nausea/vomiting    Complications: none    Transfer of care protocol was followed      Last vitals:   Visit Vitals  BP (!) 189/95 (BP Location: Left arm, Patient Position: Lying)   Pulse 68   Temp 37.1 °C (98.8 °F) (Temporal)   Resp 16   Ht 5' 1" (1.549 m)   Wt 66.1 kg (145 lb 11.6 oz)   SpO2 100%   Breastfeeding No   BMI 27.53 kg/m²     "

## 2020-06-18 NOTE — NURSING TRANSFER
Nursing Transfer Note      6/18/2020     Transfer From: PACU to 647    Transfer via stretcher    Transfer with cardiac monitoring    Transported by PCT    Medicines sent: none    Chart send with patient: Yes    Notified: son

## 2020-06-18 NOTE — CONSULTS
[facility] Hospital Medicine  Telemedicine Consult Note    Patient Name: Luiza Childers  MRN: [id number]  Admission Date: 6/16/2020  Hospital Length of Stay: 1 days  Attending Physician: Hugh Lopez MD   Primary Care Provider: Isis Richter DO       [facility] Medicine consulted for Luiza Childers to be followed through telemedicine modalities.  The patient is currently not accepted for virtual visits due to service capacity.      Nafisa Gross MD  Department of Hospital Medicine   [facility]

## 2020-06-18 NOTE — PROVATION PATIENT INSTRUCTIONS
Discharge Summary/Instructions after an Endoscopic Procedure  Patient Name: Luiza Childers  Patient MRN: [id number]  Patient YOB: 1933 Thursday, June 18, 2020  Toro Burgos MD  RESTRICTIONS:  During your procedure today, you received medications for sedation.  These   medications may affect your judgment, balance and coordination.  Therefore,   for 24 hours, you have the following restrictions:   - DO NOT drive a car, operate machinery, make legal/financial decisions,   sign important papers or drink alcohol.    ACTIVITY:  Today: no heavy lifting, straining or running due to procedural   sedation/anesthesia.  The following day: return to full activity including work.  DIET:  Eat and drink normally unless instructed otherwise.     TREATMENT FOR COMMON SIDE EFFECTS:  - Mild abdominal pain, nausea, belching, bloating or excessive gas:  rest,   eat lightly and use a heating pad.  - Sore Throat: treat with throat lozenges and/or gargle with warm salt   water.  - Because air was used during the procedure, expelling large amounts of air   from your rectum or belching is normal.  - If a bowel prep was taken, you may not have a bowel movement for 1-3 days.    This is normal.  SYMPTOMS TO WATCH FOR AND REPORT TO YOUR PHYSICIAN:  1. Abdominal pain or bloating, other than gas cramps.  2. Chest pain.  3. Back pain.  4. Signs of infection such as: chills or fever occurring within 24 hours   after the procedure.  5. Rectal bleeding, which would show as bright red, maroon, or black stools.   (A tablespoon of blood from the rectum is not serious, especially if   hemorrhoids are present.)  6. Vomiting.  7. Weakness or dizziness.  GO DIRECTLY TO THE NEAREST EMERGENCY ROOM IF YOU HAVE ANY OF THE FOLLOWING:      Difficulty breathing              Chills and/or fever over 101 F   Persistent vomiting and/or vomiting blood   Severe abdominal pain   Severe chest pain   Black, tarry stools   Bleeding- more than one  tablespoon   Any other symptom or condition that you feel may need urgent attention  Your doctor recommends these additional instructions:  If any biopsies were taken, your doctors clinic will contact you in 1 to 2   weeks with any results.  - Patient has a contact number available for emergencies.  The signs and   symptoms of potential delayed complications were discussed with the   patient.  Return to normal activities tomorrow.  Written discharge   instructions were provided to the patient.   - [facility] to [facility] care.   - Resume previous diet.   - Continue present medications.   - Repeat colonoscopy is not recommended for surveillance.   For questions, problems or results please call your physician - Toro Burgos MD at Work:  [phone].  [facility], EMERGENCY ROOM PHONE NUMBER: [phone]  IF A COMPLICATION OR EMERGENCY SITUATION ARISES AND YOU ARE UNABLE TO REACH   YOUR PHYSICIAN - GO DIRECTLY TO THE EMERGENCY ROOM.  Toro Burgos MD  6/18/2020 2:39:46 PM  This report has been verified and signed electronically.  PROVATION

## 2020-06-18 NOTE — ANESTHESIA POSTPROCEDURE EVALUATION
Anesthesia Post Evaluation    Patient: Luiza Childers    Procedure(s) Performed: Procedure(s) (LRB):  EGD (ESOPHAGOGASTRODUODENOSCOPY) (N/A)  COLONOSCOPY (N/A)    Final Anesthesia Type: general    Patient location during evaluation: PACU  Patient participation: Yes- Able to Participate  Level of consciousness: awake and alert and oriented  Post-procedure vital signs: reviewed and stable  Pain management: adequate  Airway patency: patent    PONV status at discharge: No PONV  Anesthetic complications: no      Cardiovascular status: blood pressure returned to baseline and hemodynamically stable  Respiratory status: unassisted  Hydration status: euvolemic  Follow-up not needed.          Vitals Value Taken Time   /59 06/18/20 1447   Temp 36.6 °C (97.9 °F) 06/18/20 1430   Pulse 60 06/18/20 1459   Resp 15 06/18/20 1459   SpO2 100 % 06/18/20 1459   Vitals shown include unvalidated device data.      No case tracking events are documented in the log.      Pain/Lanette Score: Pain Rating Prior to Med Admin: 7 (6/17/2020  2:00 AM)  Pain Rating Post Med Admin: 4 (6/17/2020  3:00 AM)  Lanette Score: 8 (6/18/2020  2:30 PM)

## 2020-06-18 NOTE — PLAN OF CARE
Pt cont to be confused but is better than she was the previous night. She cont to be on a clear liquid diet and has been unable to complete her golytly but is about 3/4 finished. She is having watery stool that cont to be dark brown. She has remained free of falls or injury. Bed low and locked with call light with in easy reach and bed alarm set.       Problem: Fall Injury Risk  Goal: Absence of Fall and Fall-Related Injury  Outcome: Ongoing, Progressing  Intervention: Identify and Manage Contributors to Fall Injury Risk  Flowsheets (Taken 6/18/2020 0638)  Self-Care Promotion:   BADL personal objects within reach   BADL personal routines maintained  Medication Review/Management: medications reviewed  Intervention: Promote Injury-Free Environment  Flowsheets (Taken 6/18/2020 0638)  Safety Promotion/Fall Prevention: bed alarm set  Environmental Safety Modification:   assistive device/personal items within reach   clutter free environment maintained   lighting adjusted     Problem: Adult Inpatient Plan of Care  Goal: Plan of Care Review  Outcome: Ongoing, Progressing

## 2020-06-18 NOTE — INTERVAL H&P NOTE
The patient has been examined and the H&P has been reviewed:    There is no interval changes since last encounter.    EGD/Colonoscopy: Iron deficiency anemia  Sedation: GA  ASA; Per anesthesia  Mallampati: Per anesthesia    Endoscopy risks, benefits and alternative options discussed and understood by patient/family.          Active Hospital Problems    Diagnosis  POA    *Symptomatic anemia [D64.9]  Yes    GI hemorrhage [K92.2]  Yes    Mild cognitive impairment [G31.84]  Yes    Acute blood loss anemia [D62]  Yes    CKD stage 3 due to type 2 diabetes mellitus [E11.22, N18.3]  Yes    Diabetes mellitus, type 2 [E11.9]  Yes      Resolved Hospital Problems   No resolved problems to display.

## 2020-06-19 ENCOUNTER — PATIENT OUTREACH (OUTPATIENT)
Dept: ADMINISTRATIVE | Facility: CLINIC | Age: 85
End: 2020-06-19

## 2020-06-19 NOTE — PATIENT INSTRUCTIONS
Anemia, Type Not Specified (Adult)  Red blood cells carry oxygen to the tissues of your body. Anemia is a condition in which you have too few red blood cells. You need iron to make red blood cells. The most common cause of anemia is not having enough iron. This may be because of:  · Loss of blood. This can be caused by heavy menstrual periods. It can also be caused by bleeding from the stomach or intestines.  · Poor diet. You may not be eating enough foods that contain iron.  Other causes of anemia include certain vitamin deficiencies, chronic kidney disease, and certain other chronic illnesses.  Anemia makes you to feel tired and run down. When anemia becomes severe, your skin becomes pale. You may feel short of breath after physical activity. Other symptoms include:  · Headaches  · Dizziness  · Leg cramps with physical activity  · Drowsiness  Home care  Follow these guidelines when caring for yourself at home:  · Dont overexert yourself.  · Talk with your healthcare provider before traveling by air or traveling to high altitudes.  Follow-up care  Follow up with your healthcare provider, or as advised. You may need other blood tests to find out the exact cause of your anemia. If you had testing done today, it may take several days to get all of the results. You can follow up with your own healthcare provider to get the results.  When to seek medical advice  Call your healthcare provider right away if any of these occur:  · Shortness of breath or chest pain  · Dizziness or fainting gets worse  · Vomiting blood or passing red or black-colored stool  Date Last Reviewed: 2/25/2016  © 2432-6435 [email or web address]. [address] [place] [address], [place] [address]. All rights reserved. This information is not intended as a substitute for professional medical care. Always follow your healthcare professional's instructions.

## 2020-06-19 NOTE — PLAN OF CARE
Patient was discharged home with no needs. Transport was provided by the son.     06/19/20 1008   Final Note   Assessment Type Final Discharge Note   Anticipated Discharge Disposition Home   Discharge plans and expectations educations in teach back method with documentation complete? Yes   Right Care Referral Info   Post Acute Recommendation Home-care   Post-Acute Status   Post-Acute Authorization Other   Other Status No Post-Acute Service Needs     Future Appointments   Date Time Provider Department Center   9/5/2020 10:00 AM HOME MONITOR DEVICE CHECK, [facility]   10/19/2020  9:00 AM [facility] [place] Pardo RN, CM   Ext: [phone]

## 2020-06-22 ENCOUNTER — TELEPHONE (OUTPATIENT)
Dept: PRIMARY CARE CLINIC | Facility: CLINIC | Age: 85
End: 2020-06-22

## 2020-06-22 NOTE — TELEPHONE ENCOUNTER
----- Message from Jazmin Morgan sent at 6/22/2020  2:17 PM CDT -----  Contact: Aashish ( son )-[phone]  Type:  Needs Medical Advice    Who Called: Aashish ( Son)  Reason For Call: pt's son would like for the Dr to have orders sent in for his mother to have Blood work Done  How long has patient had these symptoms:N/A  Pharmacy name and phone #: N/A  Would the patient rather a call back or a response via [facility]?  Call Back  Best Call Back Number: [phone]  Additional Information: N/A

## 2020-06-24 ENCOUNTER — LAB VISIT (OUTPATIENT)
Dept: LAB | Facility: HOSPITAL | Age: 85
End: 2020-06-24
Attending: INTERNAL MEDICINE
Payer: MEDICARE

## 2020-06-24 ENCOUNTER — TELEPHONE (OUTPATIENT)
Dept: PRIMARY CARE CLINIC | Facility: CLINIC | Age: 85
End: 2020-06-24

## 2020-06-24 DIAGNOSIS — D64.9 SYMPTOMATIC ANEMIA: ICD-10-CM

## 2020-06-24 LAB
ERYTHROCYTE [DISTWIDTH] IN BLOOD BY AUTOMATED COUNT: 14.4 % (ref 11.5–14.5)
HCT VFR BLD AUTO: 29.6 % (ref 37–48.5)
HGB BLD-MCNC: 9 G/DL (ref 12–16)
MCH RBC QN AUTO: 28.8 PG (ref 27–31)
MCHC RBC AUTO-ENTMCNC: 30.4 G/DL (ref 32–36)
MCV RBC AUTO: 95 FL (ref 82–98)
PLATELET # BLD AUTO: 176 K/UL (ref 150–350)
PMV BLD AUTO: 11.7 FL (ref 9.2–12.9)
RBC # BLD AUTO: 3.12 M/UL (ref 4–5.4)
RETICS/RBC NFR AUTO: 2.1 % (ref 0.5–2.5)
WBC # BLD AUTO: 6.58 K/UL (ref 3.9–12.7)

## 2020-06-24 PROCEDURE — 36415 COLL VENOUS BLD VENIPUNCTURE: CPT | Mod: PO

## 2020-06-24 PROCEDURE — 85045 AUTOMATED RETICULOCYTE COUNT: CPT

## 2020-06-24 PROCEDURE — 85027 COMPLETE CBC AUTOMATED: CPT

## 2020-06-24 NOTE — TELEPHONE ENCOUNTER
----- Message from An Shi sent at 6/24/2020 10:57 AM CDT -----  Contact: son/maria e/[phone]  Pt son called in regards to a missed call from the MA about lab and hospital stay.       Please advise

## 2020-06-24 NOTE — TELEPHONE ENCOUNTER
----- Message from Kami Jones sent at 6/24/2020  8:53 AM CDT -----  Contact: Aashish Childers  [phone]  Patient son was waiting on a call back about getting labs done that the hospitalist wanted to have his mother to have upon her release. They want to come in today for this at the [facility]. There are orders in the system by the hospital doctor. He's not sure if that it's the ones are not.     Red blood count low.

## 2020-06-24 NOTE — TELEPHONE ENCOUNTER
----- Message from Jeff Randhawa LPN sent at 6/23/2020  4:23 PM CDT -----  The son left a message regarding follow up labs after hospital stay. This is not Dr. Newton patient can you follow up with the son. Aashish [phone] thanks  ----- Message -----  From: Nicol Beckham LPN  Sent: 6/23/2020   4:17 PM CDT  To: Aman JARA Staff    Were you trying to reach pt?    ----- Message -----  From: Janeth Milner  Sent: 6/23/2020   3:18 PM CDT  To: Rober CABALLERO Staff    Type:  Patient Returning Call    Who Called:yasmine sanderson     Who Left Message for Patient:NURSE bree     Does the patient know what this is regarding?:NA    Would the patient rather a call back or a response via [facility]? Call back    Best Call Back Number: [phone]    Additional Information: RETURNING MISSED CALL

## 2020-07-01 ENCOUNTER — NURSE TRIAGE (OUTPATIENT)
Dept: ADMINISTRATIVE | Facility: CLINIC | Age: 85
End: 2020-07-01

## 2020-07-01 NOTE — TELEPHONE ENCOUNTER
Patient contacted on behalf of the Post Procedural Symptom Tracking Program. No answer. Second attempt to contact patient will occur tomorrow per post procedural protocol.     Reason for Disposition   No answer.  First attempt to contact caller.  Follow-up call scheduled within 15 minutes.    Additional Information   Negative: Caller has already spoken with the PCP (or office), and has no further questions   Negative: Caller has already spoken with another triager and has no further questions   Negative: Caller has already spoken with another triager or PCP (or office), and has further questions and triager able to answer questions.   Negative: Busy signal.  First attempt to contact caller.  Follow-up call scheduled within 15 minutes.    Protocols used: NO CONTACT OR DUPLICATE CONTACT CALL-A-OH

## 2020-07-02 NOTE — TELEPHONE ENCOUNTER
Spoke with pt's son; she verified pt's identity by spelling pt's first and last names and verifying pt's . Son denies that pt is currently experiencing any post-procedural symptoms such as fever, cough or SOB. Instructed pt's son to call OOC back for further assistance if needed and to call 911 for all emergencies; pt's son voiced verbal understanding and agrees with instructions.   Crow Reeves(Attending)

## 2020-07-04 NOTE — DISCHARGE SUMMARY
[facility]  Discharge Summary  Hospital Medicine    Patient Name: Luiza Childers  YOB: 1933    Admit Date: 6/16/2020    Discharge Date and Time: 6/18/2020  6:06 PM    Discharge Attending Physician: Carlos Bauman MD     Team: [facility] MED R    Reason for Admission: GI hemorrhage [K92.2]    Additional Hospital Problems   *Acute blood loss anemia [D62]     Mild cognitive impairment [G31.84]     CKD stage 3 due to type 2 diabetes mellitus [E11.22, N18.3]     Diabetes mellitus, type 2 [E11.9]       Resolved      Symptomatic anemia [D64.9] 06/18/2020       Procedures Performed: Procedure(s) (LRB):  EGD (ESOPHAGOGASTRODUODENOSCOPY) (N/A)  COLONOSCOPY (N/A)      Consults: Gastroenterology      Presentation and Hospital Course  Ms. Luiza Childers is a 87 y.o. female who presented to the ER from Cardiology Clinic for evaluation of anemia.      History was obtained from her son, as the patient has mild cognitive impairment (not oriented to month or year at baseline).  Over the last 1-2 weeks, he noticed that she has been having more dyspnea on exertion.  He mentions that doing light activity such as putting clothes into the laundry caused her to be very short of breath.  He denies any shortness of breath at rest.  He endorses decreased PO intake.  He took her BP, and found it to be low, so her Lisinopril 40mg was stopped.  She denies any dark stools or abdominal pain.  She was previously taking Aspirin 81mg three times a week.  He denies any fevers or chills.  Given her symptoms, they went to Cardiology clinic for evaluation.  Labs performed showed a Hemoglobin 6 down from 11 two months ago.  She was sent to the ER for further evaluation.     Upon arrival to the ER, temperature was 98.9F, HR 65 and /67.  Hemoglobin was  6.  She was transfused 2 units PRBCs.  She was admitted to Hospital Medicine for further management.    On hospital day 3, the patient underwent EGD and  Colonoscopy while under general anesthesia.  EGD was normal.  Hemorrhoids were found on perianal exam.   Colonoscopy showed a few small-mouthed diverticula in the sigmoid colon, a single small angioectasia without bleeding in the ascending colon.  Post-transfusion hemoglobin was 8.3.  Reticulocytes were 6.9% or RBCs.      The patient was discharged in stable condition.        Physical Examination     Vitals:    06/18/20 1624   BP: (!) 165/69   Pulse: 66   Resp: 18   Temp: 97.5 °F (36.4 °C)     Constitutional: Appears comfortable, in no acute distress  Eyes: No scleral icterus or eye discharge  Cardiovascular: Normal heart rate.  Regular heart rhythm.  No murmur heard.  Pulmonary/Chest: Effort normal and breath sounds normal. No respiratory distress. No wheezes, rales, or rhonchi  Abdominal: Soft. Bowel sounds are normal.  No distension.  No tenderness  Musculoskeletal: No deformities.  No edema.   Neurological: Alert.  Oriented to person, place, and time.   Skin: Skin is warm and dry.       Data     WBC 6.58 06/24/2020    HGB 9.0 (L) 06/24/2020    HCT 29.6 (L) 06/24/2020    MCV 95 06/24/2020     06/24/2020         Sodium   Date Value Ref Range Status   06/18/2020 138 136 - 145 mmol/L Final     Potassium   Date Value Ref Range Status   06/18/2020 3.6 3.5 - 5.1 mmol/L Final     Chloride   Date Value Ref Range Status   06/18/2020 107 95 - 110 mmol/L Final     CO2   Date Value Ref Range Status   06/18/2020 23 23 - 29 mmol/L Final     Glucose   Date Value Ref Range Status   06/18/2020 110 70 - 110 mg/dL Final     BUN, Bld   Date Value Ref Range Status   06/18/2020 25 (H) 8 - 23 mg/dL Final     Creatinine   Date Value Ref Range Status   06/18/2020 1.1 0.5 - 1.4 mg/dL Final     Calcium   Date Value Ref Range Status   06/18/2020 8.5 (L) 8.7 - 10.5 mg/dL Final     Total Protein   Date Value Ref Range Status   06/18/2020 5.9 (L) 6.0 - 8.4 g/dL Final     Albumin   Date Value Ref Range Status   06/18/2020 3.3 (L) 3.5 -  5.2 g/dL Final     Total Bilirubin   Date Value Ref Range Status   06/18/2020 0.5 0.1 - 1.0 mg/dL Final     Comment:     Alkaline Phosphatase   Date Value Ref Range Status   06/18/2020 76 55 - 135 U/L Final     AST   Date Value Ref Range Status   06/18/2020 72 (H) 10 - 40 U/L Final     ALT   Date Value Ref Range Status   06/18/2020 126 (H) 10 - 44 U/L Final     Anion Gap   Date Value Ref Range Status   06/18/2020 8 8 - 16 mmol/L Final     eGFR if    Date Value Ref Range Status   06/18/2020 52.2 (A) >60 mL/min/1.73 m^2 Final     eGFR if non    Date Value Ref Range Status   06/18/2020 45.3 (A) >60 mL/min/1.73 m^2 Final     Comment:       Hemoglobin A1C   Date Value Ref Range Status   06/16/2020 5.8 (H) 4.0 - 5.6 % Final     Comment:   04/09/2020 7.4 (H) 4.0 - 5.6 % Final     Comment:       Time Spent:  I spent  30 minutes on the discharge, which included reviewing hospital course with patient/family, reviewing discharge medications, and arranging follow-up care.        Follow Up Instructions:   Follow-up Information     Isis Richter DO In 1 week.    Specialty: Internal Medicine  Why: Receck blood counts, be sure anemia is improving.   Contact information:  [address]  [place] [address]  [phone]             Isis Richter DO.    Specialty: Internal Medicine  Contact information:  [address]  [place] [address]  [phone]                 Future Appointments   Date Time Provider Department Center   9/5/2020 10:00 AM HOME MONITOR DEVICE CHECK, [facility]   10/19/2020  9:00 AM LAB, [facility] [place]       Medications    CONTINUE these medications which have NOT CHANGED    Details    blood sugar diagnostic (ACCU-CHEK GERMÁN PLUS TEST STRP) Strp USE ONE STRIP TO TEST DAILY., Print      alpha lipoic acid 300 mg Cap Take by mouth., Historical Med      aspirin (ECOTRIN) 81 MG EC tablet Take 81 mg by mouth once daily.,  Historical Med      !! blood sugar diagnostic (ACCU-CHEK GERMÁN PLUS TEST STRP) Strp USE ONE STRIP TO TEST DAILY., Normal      cholecalciferol, vitamin D3, (VITAMIN D3) 5,000 unit Tab Take 10,000 Units by mouth once daily., Historical Med      co-enzyme Q-10 30 mg capsule Take 30 mg by mouth once daily. , Until Discontinued, Historical Med      cranberry 400 mg Cap Take by mouth., Historical Med      diclofenac sodium (VOLTAREN) 1 % Gel Apply 2 g topically once daily., Starting Wed 10/2/2019, Normal      ezetimibe (ZETIA) 10 mg tablet Take 1 tablet (10 mg total) by mouth once daily., Starting Sat 4/18/2020, Normal      !! lancets (ONETOUCH DELICA LANCETS) 33 gauge Misc Check fasting glucose daily, Normal      TURMERIC ORAL Take by mouth., Historical Med      vit A/vit C/vit E/zinc/copper (OCUVITE PRESERVISION ORAL) Take by mouth., Historical Med      STOP taking these medications       ascorbic acid (VITAMIN C) 1000 MG tablet Comments:   Reason for Stopping:         CALCIUM CARBONATE/VITAMIN D3 (VITAMIN D-3 ORAL) Comments:   Reason for Stopping:         chlorthalidone (HYGROTEN) 25 MG Tab Comments:   Reason for Stopping:         ginkgo biloba 40 mg Tab Comments:   Reason for Stopping:         glucosamine-chondroitin 500-400 mg tablet Comments:   Reason for Stopping:         KRILL OIL ORAL Comments:   Reason for Stopping:         lisinopriL (PRINIVIL,ZESTRIL) 40 MG tablet Comments:   Reason for Stopping:               Discharge Instructions:  Discharge Procedure Orders   CBC Without Differential   Standing Status: Future Number of Occurrences: 1 Standing Exp. Date: 08/17/21     RETICULOCYTES   Standing Status: Future Number of Occurrences: 1 Standing Exp. Date: 08/17/21         Carlos Bauman MD  Department of Hospital Medicine  07/04/2020 3:34 PM

## 2020-07-20 ENCOUNTER — TELEPHONE (OUTPATIENT)
Dept: INTERNAL MEDICINE | Facility: CLINIC | Age: 85
End: 2020-07-20

## 2020-07-20 DIAGNOSIS — D50.9 IRON DEFICIENCY ANEMIA, UNSPECIFIED IRON DEFICIENCY ANEMIA TYPE: Primary | ICD-10-CM

## 2020-07-20 NOTE — TELEPHONE ENCOUNTER
----- Message from Lillian Jacobson sent at 7/20/2020  9:41 AM CDT -----  Contact: Pt yasmine Zendejas--[phone].  Needs Advice    Reason for call:--Blood count labs--        Communication Preference:--Aashish--[phone]--    Additional Information: Pt yasmine Zendejas calling to see since pt blood count was down if pt needs to redo labs and have a f/u? Please call to advise.

## 2020-07-20 NOTE — TELEPHONE ENCOUNTER
Pt was suppose to have TCC after hospital d/c  Needs appt to address anemia and other medical problems    If she has HH, they could draw the blood

## 2020-07-24 ENCOUNTER — LAB VISIT (OUTPATIENT)
Dept: LAB | Facility: HOSPITAL | Age: 85
End: 2020-07-24
Attending: INTERNAL MEDICINE
Payer: MEDICARE

## 2020-07-24 DIAGNOSIS — D64.9 ANEMIA, UNSPECIFIED TYPE: ICD-10-CM

## 2020-07-24 DIAGNOSIS — D64.9 ANEMIA, UNSPECIFIED TYPE: Primary | ICD-10-CM

## 2020-07-24 PROCEDURE — 36415 COLL VENOUS BLD VENIPUNCTURE: CPT | Mod: PO

## 2020-07-24 PROCEDURE — 85025 COMPLETE CBC W/AUTO DIFF WBC: CPT

## 2020-07-24 PROCEDURE — 83540 ASSAY OF IRON: CPT

## 2020-07-24 PROCEDURE — 82728 ASSAY OF FERRITIN: CPT

## 2020-07-25 LAB
BASOPHILS # BLD AUTO: 0.02 K/UL (ref 0–0.2)
BASOPHILS NFR BLD: 0.3 % (ref 0–1.9)
DIFFERENTIAL METHOD: ABNORMAL
EOSINOPHIL # BLD AUTO: 0.3 K/UL (ref 0–0.5)
EOSINOPHIL NFR BLD: 4 % (ref 0–8)
ERYTHROCYTE [DISTWIDTH] IN BLOOD BY AUTOMATED COUNT: 13.8 % (ref 11.5–14.5)
FERRITIN SERPL-MCNC: 20 NG/ML (ref 20–300)
HCT VFR BLD AUTO: 32.9 % (ref 37–48.5)
HGB BLD-MCNC: 10.1 G/DL (ref 12–16)
IMM GRANULOCYTES # BLD AUTO: 0.01 K/UL (ref 0–0.04)
IMM GRANULOCYTES NFR BLD AUTO: 0.1 % (ref 0–0.5)
IRON SERPL-MCNC: 27 UG/DL (ref 30–160)
LYMPHOCYTES # BLD AUTO: 1.3 K/UL (ref 1–4.8)
LYMPHOCYTES NFR BLD: 18.2 % (ref 18–48)
MCH RBC QN AUTO: 27.5 PG (ref 27–31)
MCHC RBC AUTO-ENTMCNC: 30.7 G/DL (ref 32–36)
MCV RBC AUTO: 90 FL (ref 82–98)
MONOCYTES # BLD AUTO: 0.6 K/UL (ref 0.3–1)
MONOCYTES NFR BLD: 7.8 % (ref 4–15)
NEUTROPHILS # BLD AUTO: 5.1 K/UL (ref 1.8–7.7)
NEUTROPHILS NFR BLD: 69.6 % (ref 38–73)
NRBC BLD-RTO: 0 /100 WBC
PLATELET # BLD AUTO: 165 K/UL (ref 150–350)
PMV BLD AUTO: 12.9 FL (ref 9.2–12.9)
RBC # BLD AUTO: 3.67 M/UL (ref 4–5.4)
SATURATED IRON: 7 % (ref 20–50)
TOTAL IRON BINDING CAPACITY: 408 UG/DL (ref 250–450)
TRANSFERRIN SERPL-MCNC: 276 MG/DL (ref 200–375)
WBC # BLD AUTO: 7.29 K/UL (ref 3.9–12.7)

## 2020-07-28 ENCOUNTER — OFFICE VISIT (OUTPATIENT)
Dept: INTERNAL MEDICINE | Facility: CLINIC | Age: 85
End: 2020-07-28
Payer: MEDICARE

## 2020-07-28 ENCOUNTER — PATIENT OUTREACH (OUTPATIENT)
Dept: ADMINISTRATIVE | Facility: HOSPITAL | Age: 85
End: 2020-07-28

## 2020-07-28 VITALS
WEIGHT: 141.13 LBS | HEIGHT: 61 IN | BODY MASS INDEX: 26.65 KG/M2 | DIASTOLIC BLOOD PRESSURE: 62 MMHG | TEMPERATURE: 98 F | RESPIRATION RATE: 14 BRPM | OXYGEN SATURATION: 98 % | SYSTOLIC BLOOD PRESSURE: 132 MMHG | HEART RATE: 62 BPM

## 2020-07-28 DIAGNOSIS — N18.30 CKD STAGE 3 DUE TO TYPE 2 DIABETES MELLITUS: ICD-10-CM

## 2020-07-28 DIAGNOSIS — E11.22 CKD STAGE 3 DUE TO TYPE 2 DIABETES MELLITUS: ICD-10-CM

## 2020-07-28 DIAGNOSIS — I15.2 HYPERTENSION ASSOCIATED WITH DIABETES: ICD-10-CM

## 2020-07-28 DIAGNOSIS — E11.3599 TYPE 2 DIABETES MELLITUS WITH PROLIFERATIVE RETINOPATHY WITHOUT MACULAR EDEMA, WITHOUT LONG-TERM CURRENT USE OF INSULIN, UNSPECIFIED LATERALITY: ICD-10-CM

## 2020-07-28 DIAGNOSIS — E11.59 HYPERTENSION ASSOCIATED WITH DIABETES: ICD-10-CM

## 2020-07-28 DIAGNOSIS — Z09 HOSPITAL DISCHARGE FOLLOW-UP: Primary | ICD-10-CM

## 2020-07-28 DIAGNOSIS — D64.9 ANEMIA, UNSPECIFIED TYPE: ICD-10-CM

## 2020-07-28 DIAGNOSIS — G31.84 MILD COGNITIVE IMPAIRMENT: ICD-10-CM

## 2020-07-28 PROCEDURE — 99999 PR PBB SHADOW E&M-EST. PATIENT-LVL IV: CPT | Mod: PBBFAC,,, | Performed by: HOSPITALIST

## 2020-07-28 PROCEDURE — 99214 PR OFFICE/OUTPT VISIT, EST, LEVL IV, 30-39 MIN: ICD-10-PCS | Mod: S$PBB,,, | Performed by: HOSPITALIST

## 2020-07-28 PROCEDURE — 99214 OFFICE O/P EST MOD 30 MIN: CPT | Mod: S$PBB,,, | Performed by: HOSPITALIST

## 2020-07-28 PROCEDURE — 99214 OFFICE O/P EST MOD 30 MIN: CPT | Mod: PBBFAC,PO | Performed by: HOSPITALIST

## 2020-07-28 PROCEDURE — 99999 PR PBB SHADOW E&M-EST. PATIENT-LVL IV: ICD-10-PCS | Mod: PBBFAC,,, | Performed by: HOSPITALIST

## 2020-07-28 RX ORDER — LISINOPRIL 40 MG/1
20 TABLET ORAL DAILY
COMMUNITY
End: 2020-12-01

## 2020-07-28 NOTE — PROGRESS NOTES
Transitional Care Note  Subjective:       Patient ID: Luiza Childers is a 87 y.o. female.  Chief Complaint: Hospital Follow Up (sympotomatic anemia)    Family and/or Caretaker present at visit?  Yes.  Diagnostic tests reviewed/disposition: No diagnosic tests pending after this hospitalization.  Disease/illness education: yes  Home health/community services discussion/referrals: Patient has home health established at home.   Establishment or re-establishment of referral orders for community resources: No other necessary community resources.   Discussion with other health care providers: No discussion with other health care providers necessary.     @Patient ID: Luiza Childers is a 87 y.o. female.    Chief Complaint: Hospital Follow Up (sympotomatic anemia)    HPI    88 yo F with pmhx of DM2, HTN, CKD3, cardiac pacemaker, b/l carotid artery stenosis, MCI was admitted at [facility] from 6/16-6/18 for FRANK. Found to have hemoglobin 6 down from 11 two months prior. She was admitted for prbc transfusion 2 units. GI consulted and she underwent EGD and colonoscopy. EGD was normal.  Hemorrhoids were found on perianal exam.   Colonoscopy showed a few small-mouthed diverticula in the sigmoid colon, a single small angioectasia without bleeding in the ascending colon, otherwise normal.  Post-transfusion hemoglobin was 8.1-8.3.      Currently hgb on 7/24 improved to 10.1. pt is accompanied today by her son. He reports she is much improved. He has been checking her stool and not seen any obvious blood. Reports that she was taken off aspirin and she is no longer on it. He reports she initially had low bp. Now he has been checking her bp daily and if SBP >140 he will give her lisinopril. States she has been tolerating that better.      Review of Systems   Constitutional: Negative for chills and fever.   HENT: Negative for congestion and sore throat.    Eyes: Negative for pain and visual disturbance.   Respiratory: Negative for  "cough and shortness of breath.    Cardiovascular: Negative for chest pain and leg swelling.   Gastrointestinal: Negative for abdominal pain, nausea and vomiting.   Endocrine: Negative for polydipsia and polyuria.   Genitourinary: Negative for difficulty urinating and dysuria.   Musculoskeletal: Negative for arthralgias and back pain.   Skin: Negative for rash and wound.   Neurological: Negative for dizziness, weakness and headaches.   Psychiatric/Behavioral: Negative for agitation and confusion.     Past medical history, surgical history, and family medical history reviewed and updated as appropriate.    Medications and allergies reviewed.     Objective:     Vitals:    07/28/20 1144   BP: 132/62   BP Location: Left arm   Patient Position: Sitting   BP Method: Medium (Manual)   Pulse: 62   Resp: 14   Temp: 97.8 °F (36.6 °C)   TempSrc: Oral   SpO2: 98%   Weight: 64 kg (141 lb 1.5 oz)   Height: 5' 1" (1.549 m)     Body mass index is 26.66 kg/m².  Physical Exam  Vitals signs reviewed.   Constitutional:       General: She is not in acute distress.     Appearance: She is well-developed.   HENT:      Head: Normocephalic and atraumatic.   Eyes:      General:         Right eye: No discharge.         Left eye: No discharge.      Conjunctiva/sclera: Conjunctivae normal.   Neck:      Musculoskeletal: Normal range of motion and neck supple.   Cardiovascular:      Rate and Rhythm: Normal rate and regular rhythm.      Heart sounds: No friction rub.   Pulmonary:      Effort: Pulmonary effort is normal.      Breath sounds: Normal breath sounds.   Abdominal:      General: Bowel sounds are normal. There is no distension.      Palpations: Abdomen is soft.      Tenderness: There is no abdominal tenderness. There is no guarding.   Musculoskeletal:      Right lower leg: No edema.      Left lower leg: No edema.   Skin:     General: Skin is warm and dry.   Neurological:      Mental Status: She is alert.      Comments: Oriented to " person/place   Psychiatric:         Mood and Affect: Mood normal.         Behavior: Behavior normal.         Lab Results   Component Value Date    WBC 7.29 07/24/2020    HGB 10.1 (L) 07/24/2020    HCT 32.9 (L) 07/24/2020     07/24/2020    CHOL 309 (H) 04/09/2020    TRIG 240 (H) 04/09/2020    HDL 48 04/09/2020     (H) 06/18/2020    AST 72 (H) 06/18/2020     06/18/2020    K 3.6 06/18/2020     06/18/2020    CREATININE 1.1 06/18/2020    BUN 25 (H) 06/18/2020    CO2 23 06/18/2020    TSH 1.409 06/16/2020    INR 1.1 03/14/2016    HGBA1C 5.8 (H) 06/16/2020       Assessment:     1. Hospital discharge follow-up    2. Hypertension associated with diabetes    3. 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 CKD stage 3 due to type 2 diabetes mellitus    5. Mild cognitive impairment    6. Anemia, unspecified type      Plan:   Luiza was seen today for hospital follow up.    Diagnoses and all orders for this visit:    Hospital discharge follow-up  - Pt appears stable from hospital f/u. Hgb improving. Currently 10.1. No signs of active bleed. Pt will have annual in few months with PCP with repeat CBC.     Hypertension associated with diabetes  - Bp controlled. Ok to continue to monitor bp daily and give lisinopril if SBP > 140. 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 Last A1c 7.4 in April    CKD stage 3 due to type 2 diabetes mellitus  - Stable. Last Cr 1.3 in June    Mild cognitive impairment  - Stable    Anemia, unspecified type  - Hgb improving. Most recent 10.1.         RTC prn    Arcelia Diez MD  Internal Medicine    7/28/2020

## 2020-09-05 ENCOUNTER — CLINICAL SUPPORT (OUTPATIENT)
Dept: CARDIOLOGY | Facility: HOSPITAL | Age: 85
End: 2020-09-05
Attending: INTERNAL MEDICINE
Payer: MEDICARE

## 2020-09-05 DIAGNOSIS — Z95.0 CARDIAC PACEMAKER IN SITU: ICD-10-CM

## 2020-09-05 PROCEDURE — 93296 REM INTERROG EVL PM/IDS: CPT | Performed by: INTERNAL MEDICINE

## 2020-09-05 PROCEDURE — 93294 REM INTERROG EVL PM/LDLS PM: CPT | Mod: ,,, | Performed by: INTERNAL MEDICINE

## 2020-09-05 PROCEDURE — 93294 CARDIAC DEVICE CHECK - REMOTE: ICD-10-PCS | Mod: ,,, | Performed by: INTERNAL MEDICINE

## 2020-09-29 ENCOUNTER — PATIENT MESSAGE (OUTPATIENT)
Dept: OTHER | Facility: OTHER | Age: 85
End: 2020-09-29

## 2020-11-19 NOTE — PROGRESS NOTES
Subjective:       Patient ID: Luiza Childers is a 87 y.o. female.    Chief Complaint: Follow-up (pt fell 11/28 on her butt, bp low in the AM higher in the PM)    Patient is a 87 y.o.female with type 2 diabetes, hypertension associated with diabetes, CKD stage 3 due to type 2 diabetes , who presents today for follow up. Son is present today. Pt denies complaints today.      Cholesterol: reviewed  Vaccines: Influenza (today); Tetanus (declines) Pneumovax (declines); Zoster (declines)  Eye exam: due now; pt declines  Mammogram: declines  Gyn exam: hysterectomy  Colonoscopy: declines  DEXA: declines      Four months ago, she got worked up at home about something and lost her balance and fell.   Also the other day, she went to use the restroom at 4am, she fell and landed on her bottom.  Her bp was 108/44. She takes lisinopril anywhere from after 10 am to before 5. She drinks low fat milk all day long.     In march, she had low blood pressure and anemia and required two units of blood. She was hospitalized at that time.  Review of Systems   Constitutional: Negative for appetite change, chills, diaphoresis and fever.   HENT: Negative for congestion, ear discharge, ear pain, postnasal drip, tinnitus, trouble swallowing and voice change.    Eyes: Negative for discharge, redness and itching.   Respiratory: Negative for cough, chest tightness, shortness of breath and wheezing.    Cardiovascular: Negative for chest pain, palpitations and leg swelling.   Gastrointestinal: Negative for abdominal pain, constipation, diarrhea, nausea and vomiting.   Endocrine: Negative for cold intolerance and heat intolerance.   Genitourinary: Negative for difficulty urinating, flank pain, hematuria and urgency.   Musculoskeletal: Negative for arthralgias, gait problem, myalgias and neck stiffness.   Skin: Negative for color change and rash.   Neurological: Negative for dizziness, seizures, syncope and headaches.   Hematological: Negative for  adenopathy.   Psychiatric/Behavioral: Negative for agitation, behavioral problems, confusion and sleep disturbance.       Objective:      Physical Exam  Vitals signs and nursing note reviewed.   Constitutional:       General: She is not in acute distress.     Appearance: She is well-developed. She is not diaphoretic.   HENT:      Head: Normocephalic and atraumatic.      Right Ear: External ear normal.      Left Ear: External ear normal.      Nose: Nose normal.      Mouth/Throat:      Pharynx: No oropharyngeal exudate.   Eyes:      General: No scleral icterus.        Right eye: No discharge.         Left eye: No discharge.      Conjunctiva/sclera: Conjunctivae normal.      Pupils: Pupils are equal, round, and reactive to light.   Neck:      Musculoskeletal: Neck supple.      Thyroid: No thyromegaly.      Vascular: No JVD.      Trachea: No tracheal deviation.   Cardiovascular:      Rate and Rhythm: Normal rate.      Heart sounds: Normal heart sounds. No murmur. No friction rub. No gallop.    Pulmonary:      Effort: Pulmonary effort is normal. No respiratory distress.      Breath sounds: Normal breath sounds. No stridor. No wheezing or rales.   Chest:      Chest wall: No tenderness.   Abdominal:      General: Bowel sounds are normal. There is no distension.      Palpations: Abdomen is soft.      Tenderness: There is no abdominal tenderness. There is no rebound.   Musculoskeletal:         General: No tenderness.   Lymphadenopathy:      Cervical: No cervical adenopathy.   Skin:     General: Skin is warm and dry.      Findings: No erythema or rash.   Neurological:      Mental Status: She is alert and oriented to person, place, and time.   Psychiatric:         Behavior: Behavior normal.         Assessment and Plan:       1. 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 CKD stage 3 due to type 2 diabetes mellitus  Stable; avoid nsaids; hydrates well    3. Hypercholesteremia  Much improved on zetia    4. Iron deficiency anemia, unspecified iron deficiency anemia type  Check labs today; may be cause for recurrent falls  - CBC Auto Differential; Future  - Iron and TIBC; Future  - Ferritin; Future    5. Hypertension associated with diabetes  Getting low readings at times; possible orthostasis vs too much htn med  - decrease lisinopril to 10 mg daily; if readings are too high on this, will dose lisinopril 10 mg po bid; son will check bps and let us know readings    6. Hyperkalemia  Repeat lab today  - Potassium; Future    7. Impaired fasting blood sugar    - Hemoglobin A1C; Future    8. Vitamin B 12 deficiency    - Vitamin B12; Future    9. Vitamin D deficiency    - Vitamin D; Future    10. Fall, sequela  Possible due to debility vs orthostasis vs hypotension from too much medication vs anemia    Lower lisinopril dose daily and see how her bp responds      rtc 6 months  A total of 30 minutes was spent with the pateint        No follow-ups on file.

## 2020-11-24 ENCOUNTER — LAB VISIT (OUTPATIENT)
Dept: LAB | Facility: HOSPITAL | Age: 85
End: 2020-11-24
Attending: INTERNAL MEDICINE
Payer: MEDICARE

## 2020-11-24 DIAGNOSIS — E78.00 HYPERCHOLESTEREMIA: ICD-10-CM

## 2020-11-24 LAB
ALBUMIN SERPL BCP-MCNC: 3.8 G/DL (ref 3.5–5.2)
ALP SERPL-CCNC: 48 U/L (ref 55–135)
ALT SERPL W/O P-5'-P-CCNC: 11 U/L (ref 10–44)
ANION GAP SERPL CALC-SCNC: 8 MMOL/L (ref 8–16)
AST SERPL-CCNC: 16 U/L (ref 10–40)
BILIRUB SERPL-MCNC: 0.3 MG/DL (ref 0.1–1)
BUN SERPL-MCNC: 46 MG/DL (ref 8–23)
CALCIUM SERPL-MCNC: 9.9 MG/DL (ref 8.7–10.5)
CHLORIDE SERPL-SCNC: 106 MMOL/L (ref 95–110)
CHOLEST SERPL-MCNC: 212 MG/DL (ref 120–199)
CHOLEST/HDLC SERPL: 4.3 {RATIO} (ref 2–5)
CO2 SERPL-SCNC: 26 MMOL/L (ref 23–29)
CREAT SERPL-MCNC: 1.3 MG/DL (ref 0.5–1.4)
EST. GFR  (AFRICAN AMERICAN): 42.6 ML/MIN/1.73 M^2
EST. GFR  (NON AFRICAN AMERICAN): 37 ML/MIN/1.73 M^2
GLUCOSE SERPL-MCNC: 177 MG/DL (ref 70–110)
HDLC SERPL-MCNC: 49 MG/DL (ref 40–75)
HDLC SERPL: 23.1 % (ref 20–50)
LDLC SERPL CALC-MCNC: 146 MG/DL (ref 63–159)
NONHDLC SERPL-MCNC: 163 MG/DL
POTASSIUM SERPL-SCNC: 5.2 MMOL/L (ref 3.5–5.1)
PROT SERPL-MCNC: 7.1 G/DL (ref 6–8.4)
SODIUM SERPL-SCNC: 140 MMOL/L (ref 136–145)
TRIGL SERPL-MCNC: 85 MG/DL (ref 30–150)

## 2020-11-24 PROCEDURE — 80053 COMPREHEN METABOLIC PANEL: CPT

## 2020-11-24 PROCEDURE — 80061 LIPID PANEL: CPT

## 2020-11-24 PROCEDURE — 36415 COLL VENOUS BLD VENIPUNCTURE: CPT | Mod: PO

## 2020-12-01 ENCOUNTER — LAB VISIT (OUTPATIENT)
Dept: LAB | Facility: HOSPITAL | Age: 85
End: 2020-12-01
Attending: INTERNAL MEDICINE
Payer: MEDICARE

## 2020-12-01 ENCOUNTER — OFFICE VISIT (OUTPATIENT)
Dept: INTERNAL MEDICINE | Facility: CLINIC | Age: 85
End: 2020-12-01
Payer: MEDICARE

## 2020-12-01 VITALS
HEART RATE: 60 BPM | DIASTOLIC BLOOD PRESSURE: 48 MMHG | SYSTOLIC BLOOD PRESSURE: 104 MMHG | WEIGHT: 140.88 LBS | HEIGHT: 61 IN | BODY MASS INDEX: 26.6 KG/M2 | TEMPERATURE: 97 F | OXYGEN SATURATION: 100 %

## 2020-12-01 DIAGNOSIS — E55.9 VITAMIN D DEFICIENCY: ICD-10-CM

## 2020-12-01 DIAGNOSIS — D50.9 IRON DEFICIENCY ANEMIA, UNSPECIFIED IRON DEFICIENCY ANEMIA TYPE: ICD-10-CM

## 2020-12-01 DIAGNOSIS — E11.3599 TYPE 2 DIABETES MELLITUS WITH PROLIFERATIVE RETINOPATHY WITHOUT MACULAR EDEMA, WITHOUT LONG-TERM CURRENT USE OF INSULIN, UNSPECIFIED LATERALITY: Primary | ICD-10-CM

## 2020-12-01 DIAGNOSIS — E53.8 VITAMIN B 12 DEFICIENCY: ICD-10-CM

## 2020-12-01 DIAGNOSIS — E11.59 HYPERTENSION ASSOCIATED WITH DIABETES: ICD-10-CM

## 2020-12-01 DIAGNOSIS — E87.5 HYPERKALEMIA: ICD-10-CM

## 2020-12-01 DIAGNOSIS — E78.00 HYPERCHOLESTEREMIA: ICD-10-CM

## 2020-12-01 DIAGNOSIS — E11.22 CKD STAGE 3 DUE TO TYPE 2 DIABETES MELLITUS: ICD-10-CM

## 2020-12-01 DIAGNOSIS — I15.2 HYPERTENSION ASSOCIATED WITH DIABETES: ICD-10-CM

## 2020-12-01 DIAGNOSIS — E11.3599 TYPE 2 DIABETES MELLITUS WITH PROLIFERATIVE RETINOPATHY WITHOUT MACULAR EDEMA, WITHOUT LONG-TERM CURRENT USE OF INSULIN, UNSPECIFIED LATERALITY: ICD-10-CM

## 2020-12-01 DIAGNOSIS — R73.01 IMPAIRED FASTING BLOOD SUGAR: ICD-10-CM

## 2020-12-01 DIAGNOSIS — N18.30 CKD STAGE 3 DUE TO TYPE 2 DIABETES MELLITUS: ICD-10-CM
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
## 2020-12-01 LAB
25(OH)D3+25(OH)D2 SERPL-MCNC: 83 NG/ML (ref 30–96)
BASOPHILS # BLD AUTO: 0.01 K/UL (ref 0–0.2)
BASOPHILS NFR BLD: 0.1 % (ref 0–1.9)
DIFFERENTIAL METHOD: ABNORMAL
EOSINOPHIL # BLD AUTO: 0.2 K/UL (ref 0–0.5)
EOSINOPHIL NFR BLD: 2.7 % (ref 0–8)
ERYTHROCYTE [DISTWIDTH] IN BLOOD BY AUTOMATED COUNT: 15.4 % (ref 11.5–14.5)
ESTIMATED AVG GLUCOSE: 169 MG/DL (ref 68–131)
FERRITIN SERPL-MCNC: 30 NG/ML (ref 20–300)
HBA1C MFR BLD HPLC: 7.5 % (ref 4–5.6)
HCT VFR BLD AUTO: 31.9 % (ref 37–48.5)
HGB BLD-MCNC: 9.6 G/DL (ref 12–16)
IMM GRANULOCYTES # BLD AUTO: 0.01 K/UL (ref 0–0.04)
IMM GRANULOCYTES NFR BLD AUTO: 0.1 % (ref 0–0.5)
IRON SERPL-MCNC: 35 UG/DL (ref 30–160)
LYMPHOCYTES # BLD AUTO: 1.3 K/UL (ref 1–4.8)
LYMPHOCYTES NFR BLD: 17.2 % (ref 18–48)
MCH RBC QN AUTO: 27.4 PG (ref 27–31)
MCHC RBC AUTO-ENTMCNC: 30.1 G/DL (ref 32–36)
MCV RBC AUTO: 91 FL (ref 82–98)
MONOCYTES # BLD AUTO: 0.5 K/UL (ref 0.3–1)
MONOCYTES NFR BLD: 6.8 % (ref 4–15)
NEUTROPHILS # BLD AUTO: 5.4 K/UL (ref 1.8–7.7)
NEUTROPHILS NFR BLD: 73.1 % (ref 38–73)
NRBC BLD-RTO: 0 /100 WBC
PLATELET # BLD AUTO: 183 K/UL (ref 150–350)
PMV BLD AUTO: 12.2 FL (ref 9.2–12.9)
POTASSIUM SERPL-SCNC: 5.1 MMOL/L (ref 3.5–5.1)
RBC # BLD AUTO: 3.51 M/UL (ref 4–5.4)
SATURATED IRON: 9 % (ref 20–50)
TOTAL IRON BINDING CAPACITY: 369 UG/DL (ref 250–450)
TRANSFERRIN SERPL-MCNC: 249 MG/DL (ref 200–375)
VIT B12 SERPL-MCNC: 500 PG/ML (ref 210–950)
WBC # BLD AUTO: 7.45 K/UL (ref 3.9–12.7)

## 2020-12-01 PROCEDURE — 82728 ASSAY OF FERRITIN: CPT

## 2020-12-01 PROCEDURE — 85025 COMPLETE CBC W/AUTO DIFF WBC: CPT

## 2020-12-01 PROCEDURE — G0008 ADMIN INFLUENZA VIRUS VAC: HCPCS | Mod: PBBFAC,PO

## 2020-12-01 PROCEDURE — 83036 HEMOGLOBIN GLYCOSYLATED A1C: CPT

## 2020-12-01 PROCEDURE — 36415 COLL VENOUS BLD VENIPUNCTURE: CPT | Mod: PO

## 2020-12-01 PROCEDURE — 99999 PR PBB SHADOW E&M-EST. PATIENT-LVL IV: ICD-10-PCS | Mod: PBBFAC,,, | Performed by: INTERNAL MEDICINE

## 2020-12-01 PROCEDURE — 83540 ASSAY OF IRON: CPT

## 2020-12-01 PROCEDURE — 84132 ASSAY OF SERUM POTASSIUM: CPT

## 2020-12-01 PROCEDURE — 99214 OFFICE O/P EST MOD 30 MIN: CPT | Mod: S$PBB,,, | Performed by: INTERNAL MEDICINE

## 2020-12-01 PROCEDURE — 99999 PR PBB SHADOW E&M-EST. PATIENT-LVL IV: CPT | Mod: PBBFAC,,, | Performed by: INTERNAL MEDICINE

## 2020-12-01 PROCEDURE — 99214 OFFICE O/P EST MOD 30 MIN: CPT | Mod: PBBFAC,PO,25 | Performed by: INTERNAL MEDICINE

## 2020-12-01 PROCEDURE — 82607 VITAMIN B-12: CPT

## 2020-12-01 PROCEDURE — 90694 VACC AIIV4 NO PRSRV 0.5ML IM: CPT | Mod: PBBFAC,PO

## 2020-12-01 PROCEDURE — 99214 PR OFFICE/OUTPT VISIT, EST, LEVL IV, 30-39 MIN: ICD-10-PCS | Mod: S$PBB,,, | Performed by: INTERNAL MEDICINE

## 2020-12-01 PROCEDURE — 82306 VITAMIN D 25 HYDROXY: CPT

## 2020-12-01 RX ORDER — LISINOPRIL 10 MG/1
10 TABLET ORAL DAILY
Qty: 90 TABLET | Refills: 3 | Status: SHIPPED | OUTPATIENT
Start: 2020-12-01 | End: 2021-12-07 | Stop reason: SDUPTHER

## 2020-12-04 ENCOUNTER — CLINICAL SUPPORT (OUTPATIENT)
Dept: CARDIOLOGY | Facility: HOSPITAL | Age: 85
End: 2020-12-04
Payer: MEDICARE

## 2020-12-04 DIAGNOSIS — Z95.0 PRESENCE OF CARDIAC PACEMAKER: ICD-10-CM

## 2020-12-04 DIAGNOSIS — R55 SYNCOPE AND COLLAPSE: ICD-10-CM

## 2020-12-04 PROCEDURE — 93294 CARDIAC DEVICE CHECK - REMOTE: ICD-10-PCS | Mod: ,,, | Performed by: INTERNAL MEDICINE

## 2020-12-04 PROCEDURE — 93294 REM INTERROG EVL PM/LDLS PM: CPT | Mod: ,,, | Performed by: INTERNAL MEDICINE

## 2020-12-04 PROCEDURE — 93296 REM INTERROG EVL PM/IDS: CPT | Performed by: INTERNAL MEDICINE

## 2020-12-11 ENCOUNTER — PATIENT MESSAGE (OUTPATIENT)
Dept: OTHER | Facility: OTHER | Age: 85
End: 2020-12-11

## 2021-01-07 ENCOUNTER — TELEPHONE (OUTPATIENT)
Dept: INTERNAL MEDICINE | Facility: CLINIC | Age: 86
End: 2021-01-07

## 2021-01-07 ENCOUNTER — LAB VISIT (OUTPATIENT)
Dept: LAB | Facility: HOSPITAL | Age: 86
End: 2021-01-07
Attending: INTERNAL MEDICINE
Payer: MEDICARE

## 2021-01-07 DIAGNOSIS — E11.3599 TYPE 2 DIABETES MELLITUS WITH PROLIFERATIVE RETINOPATHY WITHOUT MACULAR EDEMA, WITHOUT LONG-TERM CURRENT USE OF INSULIN, UNSPECIFIED LATERALITY: ICD-10-CM

## 2021-01-07 LAB
ALBUMIN/CREAT UR: 10.3 UG/MG (ref 0–30)
BILIRUB UR QL STRIP: NEGATIVE
CLARITY UR REFRACT.AUTO: CLEAR
COLOR UR AUTO: YELLOW
CREAT UR-MCNC: 58 MG/DL (ref 15–325)
GLUCOSE UR QL STRIP: NEGATIVE
HGB UR QL STRIP: NEGATIVE
KETONES UR QL STRIP: NEGATIVE
LEUKOCYTE ESTERASE UR QL STRIP: NEGATIVE
MICROALBUMIN UR DL<=1MG/L-MCNC: 6 UG/ML
MICROSCOPIC COMMENT: NORMAL
NITRITE UR QL STRIP: NEGATIVE
PH UR STRIP: 5 [PH] (ref 5–8)
PROT UR QL STRIP: NEGATIVE
RBC #/AREA URNS AUTO: 0 /HPF (ref 0–4)
SP GR UR STRIP: 1.01 (ref 1–1.03)
SQUAMOUS #/AREA URNS AUTO: 0 /HPF
URN SPEC COLLECT METH UR: NORMAL
WBC #/AREA URNS AUTO: 2 /HPF (ref 0–5)

## 2021-01-07 PROCEDURE — 81001 URINALYSIS AUTO W/SCOPE: CPT

## 2021-01-07 PROCEDURE — 82043 UR ALBUMIN QUANTITATIVE: CPT

## 2021-01-07 PROCEDURE — 82570 ASSAY OF URINE CREATININE: CPT

## 2021-01-08 ENCOUNTER — TELEPHONE (OUTPATIENT)
Dept: INTERNAL MEDICINE | Facility: CLINIC | Age: 86
End: 2021-01-08

## 2021-02-15 ENCOUNTER — TELEPHONE (OUTPATIENT)
Dept: INTERNAL MEDICINE | Facility: CLINIC | Age: 86
End: 2021-02-15

## 2021-02-23 ENCOUNTER — OFFICE VISIT (OUTPATIENT)
Dept: INTERNAL MEDICINE | Facility: CLINIC | Age: 86
End: 2021-02-23
Payer: MEDICARE

## 2021-02-23 ENCOUNTER — TELEPHONE (OUTPATIENT)
Dept: INTERNAL MEDICINE | Facility: CLINIC | Age: 86
End: 2021-02-23

## 2021-02-23 DIAGNOSIS — E11.3599 TYPE 2 DIABETES MELLITUS WITH PROLIFERATIVE RETINOPATHY WITHOUT MACULAR EDEMA, WITHOUT LONG-TERM CURRENT USE OF INSULIN, UNSPECIFIED LATERALITY: ICD-10-CM

## 2021-02-23 DIAGNOSIS — E11.22 CKD STAGE 3 DUE TO TYPE 2 DIABETES MELLITUS: Primary | ICD-10-CM

## 2021-02-23 DIAGNOSIS — N18.30 CKD STAGE 3 DUE TO TYPE 2 DIABETES MELLITUS: Primary | ICD-10-CM

## 2021-02-23 DIAGNOSIS — D69.2 SENILE PURPURA: ICD-10-CM

## 2021-02-23 PROCEDURE — 99442 PR PHYSICIAN TELEPHONE EVALUATION 11-20 MIN: CPT | Mod: 95,,, | Performed by: INTERNAL MEDICINE

## 2021-02-23 PROCEDURE — 99442 PR PHYSICIAN TELEPHONE EVALUATION 11-20 MIN: ICD-10-PCS | Mod: 95,,, | Performed by: INTERNAL MEDICINE

## 2021-03-02 ENCOUNTER — HOSPITAL ENCOUNTER (INPATIENT)
Facility: HOSPITAL | Age: 86
LOS: 1 days | Discharge: HOME HEALTH | DRG: 812 | End: 2021-03-03
Attending: EMERGENCY MEDICINE | Admitting: HOSPITALIST
Payer: MEDICARE

## 2021-03-02 ENCOUNTER — TELEPHONE (OUTPATIENT)
Dept: INTERNAL MEDICINE | Facility: CLINIC | Age: 86
End: 2021-03-02

## 2021-03-02 DIAGNOSIS — R53.83 FATIGUE: ICD-10-CM

## 2021-03-02 DIAGNOSIS — R07.9 CHEST PAIN: ICD-10-CM

## 2021-03-02 DIAGNOSIS — D64.9 ANEMIA REQUIRING TRANSFUSIONS: Primary | ICD-10-CM

## 2021-03-02 PROBLEM — D50.9 IRON DEFICIENCY ANEMIA: Status: ACTIVE | Noted: 2021-03-02

## 2021-03-02 LAB
ABO + RH BLD: NORMAL
ALBUMIN SERPL BCP-MCNC: 3.8 G/DL (ref 3.5–5.2)
ALP SERPL-CCNC: 50 U/L (ref 55–135)
ALT SERPL W/O P-5'-P-CCNC: 20 U/L (ref 10–44)
ANION GAP SERPL CALC-SCNC: 8 MMOL/L (ref 8–16)
AST SERPL-CCNC: 22 U/L (ref 10–40)
BASOPHILS # BLD AUTO: 0.02 K/UL (ref 0–0.2)
BASOPHILS NFR BLD: 0.3 % (ref 0–1.9)
BILIRUB SERPL-MCNC: 0.3 MG/DL (ref 0.1–1)
BLD GP AB SCN CELLS X3 SERPL QL: NORMAL
BUN SERPL-MCNC: 39 MG/DL (ref 6–30)
BUN SERPL-MCNC: 41 MG/DL (ref 8–23)
CALCIUM SERPL-MCNC: 9.6 MG/DL (ref 8.7–10.5)
CHLORIDE SERPL-SCNC: 104 MMOL/L (ref 95–110)
CHLORIDE SERPL-SCNC: 107 MMOL/L (ref 95–110)
CO2 SERPL-SCNC: 22 MMOL/L (ref 23–29)
CREAT SERPL-MCNC: 1.2 MG/DL (ref 0.5–1.4)
CREAT SERPL-MCNC: 1.2 MG/DL (ref 0.5–1.4)
CTP QC/QA: YES
DIFFERENTIAL METHOD: ABNORMAL
EOSINOPHIL # BLD AUTO: 0.3 K/UL (ref 0–0.5)
EOSINOPHIL NFR BLD: 5.1 % (ref 0–8)
ERYTHROCYTE [DISTWIDTH] IN BLOOD BY AUTOMATED COUNT: 14.4 % (ref 11.5–14.5)
EST. GFR  (AFRICAN AMERICAN): 47 ML/MIN/1.73 M^2
EST. GFR  (NON AFRICAN AMERICAN): 40.7 ML/MIN/1.73 M^2
FERRITIN SERPL-MCNC: 7 NG/ML (ref 20–300)
FOLATE SERPL-MCNC: 8.2 NG/ML (ref 4–24)
GLUCOSE SERPL-MCNC: 167 MG/DL (ref 70–110)
GLUCOSE SERPL-MCNC: 172 MG/DL (ref 70–110)
HCT VFR BLD AUTO: 21.7 % (ref 37–48.5)
HCT VFR BLD CALC: 20 %PCV (ref 36–54)
HGB BLD-MCNC: 6.6 G/DL (ref 12–16)
IMM GRANULOCYTES # BLD AUTO: 0.01 K/UL (ref 0–0.04)
IMM GRANULOCYTES NFR BLD AUTO: 0.2 % (ref 0–0.5)
IRON SERPL-MCNC: 27 UG/DL (ref 30–160)
LYMPHOCYTES # BLD AUTO: 1.3 K/UL (ref 1–4.8)
LYMPHOCYTES NFR BLD: 20.7 % (ref 18–48)
MCH RBC QN AUTO: 24.9 PG (ref 27–31)
MCHC RBC AUTO-ENTMCNC: 30.4 G/DL (ref 32–36)
MCV RBC AUTO: 82 FL (ref 82–98)
MONOCYTES # BLD AUTO: 0.5 K/UL (ref 0.3–1)
MONOCYTES NFR BLD: 7.4 % (ref 4–15)
NEUTROPHILS # BLD AUTO: 4 K/UL (ref 1.8–7.7)
NEUTROPHILS NFR BLD: 66.3 % (ref 38–73)
NRBC BLD-RTO: 0 /100 WBC
PLATELET # BLD AUTO: 217 K/UL (ref 150–350)
PMV BLD AUTO: 11.6 FL (ref 9.2–12.9)
POC IONIZED CALCIUM: 1.29 MMOL/L (ref 1.06–1.42)
POC TCO2 (MEASURED): 23 MMOL/L (ref 23–29)
POCT GLUCOSE: 123 MG/DL (ref 70–110)
POTASSIUM BLD-SCNC: 4.5 MMOL/L (ref 3.5–5.1)
POTASSIUM SERPL-SCNC: 4.9 MMOL/L (ref 3.5–5.1)
PROT SERPL-MCNC: 7.3 G/DL (ref 6–8.4)
RBC # BLD AUTO: 2.65 M/UL (ref 4–5.4)
RETICS/RBC NFR AUTO: 1.5 % (ref 0.5–2.5)
SAMPLE: ABNORMAL
SARS-COV-2 RDRP RESP QL NAA+PROBE: NEGATIVE
SATURATED IRON: 5 % (ref 20–50)
SODIUM BLD-SCNC: 136 MMOL/L (ref 136–145)
SODIUM SERPL-SCNC: 137 MMOL/L (ref 136–145)
TOTAL IRON BINDING CAPACITY: 496 UG/DL (ref 250–450)
TRANSFERRIN SERPL-MCNC: 335 MG/DL (ref 200–375)
TROPONIN I SERPL DL<=0.01 NG/ML-MCNC: 0.06 NG/ML (ref 0–0.03)
VIT B12 SERPL-MCNC: 476 PG/ML (ref 210–950)
WBC # BLD AUTO: 6.08 K/UL (ref 3.9–12.7)

## 2021-03-02 PROCEDURE — U0002 COVID-19 LAB TEST NON-CDC: HCPCS | Performed by: PHYSICIAN ASSISTANT

## 2021-03-02 PROCEDURE — 82746 ASSAY OF FOLIC ACID SERUM: CPT

## 2021-03-02 PROCEDURE — 82728 ASSAY OF FERRITIN: CPT

## 2021-03-02 PROCEDURE — 80047 BASIC METABLC PNL IONIZED CA: CPT | Mod: 59

## 2021-03-02 PROCEDURE — 82607 VITAMIN B-12: CPT

## 2021-03-02 PROCEDURE — 25000003 PHARM REV CODE 250: Performed by: HOSPITALIST

## 2021-03-02 PROCEDURE — 99285 EMERGENCY DEPT VISIT HI MDM: CPT | Mod: 25

## 2021-03-02 PROCEDURE — 36415 COLL VENOUS BLD VENIPUNCTURE: CPT

## 2021-03-02 PROCEDURE — 85045 AUTOMATED RETICULOCYTE COUNT: CPT

## 2021-03-02 PROCEDURE — 85025 COMPLETE CBC W/AUTO DIFF WBC: CPT

## 2021-03-02 PROCEDURE — P9016 RBC LEUKOCYTES REDUCED: HCPCS

## 2021-03-02 PROCEDURE — 99223 1ST HOSP IP/OBS HIGH 75: CPT | Mod: AI,,, | Performed by: HOSPITALIST

## 2021-03-02 PROCEDURE — 86900 BLOOD TYPING SEROLOGIC ABO: CPT

## 2021-03-02 PROCEDURE — 86920 COMPATIBILITY TEST SPIN: CPT

## 2021-03-02 PROCEDURE — 84484 ASSAY OF TROPONIN QUANT: CPT

## 2021-03-02 PROCEDURE — 83540 ASSAY OF IRON: CPT

## 2021-03-02 PROCEDURE — 99285 PR EMERGENCY DEPT VISIT,LEVEL V: ICD-10-PCS | Mod: ,,, | Performed by: PHYSICIAN ASSISTANT

## 2021-03-02 PROCEDURE — 36430 TRANSFUSION BLD/BLD COMPNT: CPT

## 2021-03-02 PROCEDURE — 11000001 HC ACUTE MED/SURG PRIVATE ROOM

## 2021-03-02 PROCEDURE — 99223 PR INITIAL HOSPITAL CARE,LEVL III: ICD-10-PCS | Mod: AI,,, | Performed by: HOSPITALIST

## 2021-03-02 PROCEDURE — 94761 N-INVAS EAR/PLS OXIMETRY MLT: CPT

## 2021-03-02 PROCEDURE — 63600175 PHARM REV CODE 636 W HCPCS: Performed by: HOSPITALIST

## 2021-03-02 PROCEDURE — 80053 COMPREHEN METABOLIC PANEL: CPT

## 2021-03-02 PROCEDURE — 99285 EMERGENCY DEPT VISIT HI MDM: CPT | Mod: ,,, | Performed by: PHYSICIAN ASSISTANT

## 2021-03-02 RX ORDER — ONDANSETRON 8 MG/1
8 TABLET, ORALLY DISINTEGRATING ORAL EVERY 8 HOURS PRN
Status: DISCONTINUED | OUTPATIENT
Start: 2021-03-02 | End: 2021-03-03 | Stop reason: HOSPADM

## 2021-03-02 RX ORDER — LIDOCAINE 50 MG/G
1 PATCH TOPICAL
Status: DISCONTINUED | OUTPATIENT
Start: 2021-03-02 | End: 2021-03-03 | Stop reason: HOSPADM

## 2021-03-02 RX ORDER — TALC
6 POWDER (GRAM) TOPICAL NIGHTLY
Status: DISCONTINUED | OUTPATIENT
Start: 2021-03-02 | End: 2021-03-03 | Stop reason: HOSPADM

## 2021-03-02 RX ORDER — ONDANSETRON 2 MG/ML
4 INJECTION INTRAMUSCULAR; INTRAVENOUS EVERY 8 HOURS PRN
Status: DISCONTINUED | OUTPATIENT
Start: 2021-03-02 | End: 2021-03-03 | Stop reason: HOSPADM

## 2021-03-02 RX ORDER — IBUPROFEN 200 MG
16 TABLET ORAL
Status: DISCONTINUED | OUTPATIENT
Start: 2021-03-02 | End: 2021-03-03 | Stop reason: HOSPADM

## 2021-03-02 RX ORDER — ACETAMINOPHEN 325 MG/1
650 TABLET ORAL EVERY 4 HOURS PRN
Status: DISCONTINUED | OUTPATIENT
Start: 2021-03-02 | End: 2021-03-03 | Stop reason: HOSPADM

## 2021-03-02 RX ORDER — LISINOPRIL 2.5 MG/1
10 TABLET ORAL DAILY
Status: DISCONTINUED | OUTPATIENT
Start: 2021-03-03 | End: 2021-03-03

## 2021-03-02 RX ORDER — GLUCAGON 1 MG
1 KIT INJECTION
Status: DISCONTINUED | OUTPATIENT
Start: 2021-03-02 | End: 2021-03-03 | Stop reason: HOSPADM

## 2021-03-02 RX ORDER — IBUPROFEN 200 MG
24 TABLET ORAL
Status: DISCONTINUED | OUTPATIENT
Start: 2021-03-02 | End: 2021-03-03 | Stop reason: HOSPADM

## 2021-03-02 RX ORDER — SODIUM CHLORIDE 0.9 % (FLUSH) 0.9 %
10 SYRINGE (ML) INJECTION
Status: DISCONTINUED | OUTPATIENT
Start: 2021-03-02 | End: 2021-03-03 | Stop reason: HOSPADM

## 2021-03-02 RX ORDER — PANTOPRAZOLE SODIUM 40 MG/1
40 TABLET, DELAYED RELEASE ORAL DAILY
Status: DISCONTINUED | OUTPATIENT
Start: 2021-03-03 | End: 2021-03-03 | Stop reason: HOSPADM

## 2021-03-02 RX ORDER — INSULIN ASPART 100 [IU]/ML
0-5 INJECTION, SOLUTION INTRAVENOUS; SUBCUTANEOUS
Status: DISCONTINUED | OUTPATIENT
Start: 2021-03-02 | End: 2021-03-03 | Stop reason: HOSPADM

## 2021-03-02 RX ORDER — AMOXICILLIN 250 MG
1 CAPSULE ORAL DAILY PRN
Status: DISCONTINUED | OUTPATIENT
Start: 2021-03-02 | End: 2021-03-03 | Stop reason: HOSPADM

## 2021-03-02 RX ORDER — POLYETHYLENE GLYCOL 3350 17 G/17G
17 POWDER, FOR SOLUTION ORAL DAILY PRN
Status: DISCONTINUED | OUTPATIENT
Start: 2021-03-02 | End: 2021-03-03 | Stop reason: HOSPADM

## 2021-03-02 RX ORDER — IPRATROPIUM BROMIDE AND ALBUTEROL SULFATE 2.5; .5 MG/3ML; MG/3ML
3 SOLUTION RESPIRATORY (INHALATION) EVERY 4 HOURS PRN
Status: DISCONTINUED | OUTPATIENT
Start: 2021-03-02 | End: 2021-03-03 | Stop reason: HOSPADM

## 2021-03-02 RX ORDER — TALC
6 POWDER (GRAM) TOPICAL NIGHTLY PRN
Status: DISCONTINUED | OUTPATIENT
Start: 2021-03-02 | End: 2021-03-02

## 2021-03-02 RX ORDER — ACETAMINOPHEN 500 MG
5000 TABLET ORAL DAILY
Status: DISCONTINUED | OUTPATIENT
Start: 2021-03-03 | End: 2021-03-03 | Stop reason: HOSPADM

## 2021-03-02 RX ORDER — HYDROCODONE BITARTRATE AND ACETAMINOPHEN 500; 5 MG/1; MG/1
TABLET ORAL
Status: DISCONTINUED | OUTPATIENT
Start: 2021-03-02 | End: 2021-03-03 | Stop reason: HOSPADM

## 2021-03-02 RX ORDER — DIPHENHYDRAMINE HCL 25 MG
25 CAPSULE ORAL ONCE
Status: DISCONTINUED | OUTPATIENT
Start: 2021-03-02 | End: 2021-03-03 | Stop reason: HOSPADM

## 2021-03-02 RX ORDER — EZETIMIBE 10 MG/1
10 TABLET ORAL DAILY
Status: DISCONTINUED | OUTPATIENT
Start: 2021-03-03 | End: 2021-03-03 | Stop reason: HOSPADM

## 2021-03-02 RX ADMIN — LIDOCAINE 1 PATCH: 50 PATCH TOPICAL at 06:03

## 2021-03-02 RX ADMIN — SODIUM CHLORIDE 125 MG: 9 INJECTION, SOLUTION INTRAVENOUS at 06:03

## 2021-03-02 RX ADMIN — MELATONIN TAB 3 MG 6 MG: 3 TAB at 09:03

## 2021-03-03 VITALS
SYSTOLIC BLOOD PRESSURE: 133 MMHG | RESPIRATION RATE: 17 BRPM | HEART RATE: 63 BPM | TEMPERATURE: 97 F | OXYGEN SATURATION: 98 % | DIASTOLIC BLOOD PRESSURE: 61 MMHG | HEIGHT: 61 IN | WEIGHT: 141.75 LBS | BODY MASS INDEX: 26.76 KG/M2

## 2021-03-03 LAB
ALBUMIN SERPL BCP-MCNC: 3.3 G/DL (ref 3.5–5.2)
ALP SERPL-CCNC: 41 U/L (ref 55–135)
ALT SERPL W/O P-5'-P-CCNC: 16 U/L (ref 10–44)
ANION GAP SERPL CALC-SCNC: 9 MMOL/L (ref 8–16)
AST SERPL-CCNC: 12 U/L (ref 10–40)
BASOPHILS # BLD AUTO: 0.01 K/UL (ref 0–0.2)
BASOPHILS # BLD AUTO: 0.01 K/UL (ref 0–0.2)
BASOPHILS # BLD AUTO: 0.02 K/UL (ref 0–0.2)
BASOPHILS NFR BLD: 0.2 % (ref 0–1.9)
BASOPHILS NFR BLD: 0.2 % (ref 0–1.9)
BASOPHILS NFR BLD: 0.4 % (ref 0–1.9)
BILIRUB SERPL-MCNC: 0.7 MG/DL (ref 0.1–1)
BLD PROD TYP BPU: NORMAL
BLD PROD TYP BPU: NORMAL
BLOOD UNIT EXPIRATION DATE: NORMAL
BLOOD UNIT EXPIRATION DATE: NORMAL
BLOOD UNIT TYPE CODE: 6200
BLOOD UNIT TYPE CODE: 6200
BLOOD UNIT TYPE: NORMAL
BLOOD UNIT TYPE: NORMAL
BUN SERPL-MCNC: 33 MG/DL (ref 8–23)
CALCIUM SERPL-MCNC: 9.4 MG/DL (ref 8.7–10.5)
CHLORIDE SERPL-SCNC: 108 MMOL/L (ref 95–110)
CO2 SERPL-SCNC: 22 MMOL/L (ref 23–29)
CODING SYSTEM: NORMAL
CODING SYSTEM: NORMAL
CREAT SERPL-MCNC: 1 MG/DL (ref 0.5–1.4)
DIFFERENTIAL METHOD: ABNORMAL
DISPENSE STATUS: NORMAL
DISPENSE STATUS: NORMAL
EOSINOPHIL # BLD AUTO: 0.2 K/UL (ref 0–0.5)
EOSINOPHIL # BLD AUTO: 0.2 K/UL (ref 0–0.5)
EOSINOPHIL # BLD AUTO: 0.3 K/UL (ref 0–0.5)
EOSINOPHIL NFR BLD: 3.5 % (ref 0–8)
EOSINOPHIL NFR BLD: 4.4 % (ref 0–8)
EOSINOPHIL NFR BLD: 4.7 % (ref 0–8)
ERYTHROCYTE [DISTWIDTH] IN BLOOD BY AUTOMATED COUNT: 14.2 % (ref 11.5–14.5)
ERYTHROCYTE [DISTWIDTH] IN BLOOD BY AUTOMATED COUNT: 14.3 % (ref 11.5–14.5)
ERYTHROCYTE [DISTWIDTH] IN BLOOD BY AUTOMATED COUNT: 14.5 % (ref 11.5–14.5)
EST. GFR  (AFRICAN AMERICAN): 58.5 ML/MIN/1.73 M^2
EST. GFR  (NON AFRICAN AMERICAN): 50.8 ML/MIN/1.73 M^2
GLUCOSE SERPL-MCNC: 110 MG/DL (ref 70–110)
HCT VFR BLD AUTO: 21.6 % (ref 37–48.5)
HCT VFR BLD AUTO: 22.2 % (ref 37–48.5)
HCT VFR BLD AUTO: 26.9 % (ref 37–48.5)
HGB BLD-MCNC: 6.8 G/DL (ref 12–16)
HGB BLD-MCNC: 6.9 G/DL (ref 12–16)
HGB BLD-MCNC: 8.4 G/DL (ref 12–16)
IMM GRANULOCYTES # BLD AUTO: 0.01 K/UL (ref 0–0.04)
IMM GRANULOCYTES # BLD AUTO: 0.01 K/UL (ref 0–0.04)
IMM GRANULOCYTES # BLD AUTO: 0.02 K/UL (ref 0–0.04)
IMM GRANULOCYTES NFR BLD AUTO: 0.2 % (ref 0–0.5)
IMM GRANULOCYTES NFR BLD AUTO: 0.2 % (ref 0–0.5)
IMM GRANULOCYTES NFR BLD AUTO: 0.4 % (ref 0–0.5)
LYMPHOCYTES # BLD AUTO: 0.9 K/UL (ref 1–4.8)
LYMPHOCYTES # BLD AUTO: 1.2 K/UL (ref 1–4.8)
LYMPHOCYTES # BLD AUTO: 1.3 K/UL (ref 1–4.8)
LYMPHOCYTES NFR BLD: 16.5 % (ref 18–48)
LYMPHOCYTES NFR BLD: 22 % (ref 18–48)
LYMPHOCYTES NFR BLD: 22.6 % (ref 18–48)
MAGNESIUM SERPL-MCNC: 2 MG/DL (ref 1.6–2.6)
MCH RBC QN AUTO: 25.1 PG (ref 27–31)
MCH RBC QN AUTO: 25.8 PG (ref 27–31)
MCH RBC QN AUTO: 25.9 PG (ref 27–31)
MCHC RBC AUTO-ENTMCNC: 31.1 G/DL (ref 32–36)
MCHC RBC AUTO-ENTMCNC: 31.2 G/DL (ref 32–36)
MCHC RBC AUTO-ENTMCNC: 31.5 G/DL (ref 32–36)
MCV RBC AUTO: 81 FL (ref 82–98)
MCV RBC AUTO: 82 FL (ref 82–98)
MCV RBC AUTO: 83 FL (ref 82–98)
MONOCYTES # BLD AUTO: 0.4 K/UL (ref 0.3–1)
MONOCYTES # BLD AUTO: 0.5 K/UL (ref 0.3–1)
MONOCYTES # BLD AUTO: 0.5 K/UL (ref 0.3–1)
MONOCYTES NFR BLD: 7.8 % (ref 4–15)
MONOCYTES NFR BLD: 8 % (ref 4–15)
MONOCYTES NFR BLD: 8.3 % (ref 4–15)
NEUTROPHILS # BLD AUTO: 3.5 K/UL (ref 1.8–7.7)
NEUTROPHILS # BLD AUTO: 3.7 K/UL (ref 1.8–7.7)
NEUTROPHILS # BLD AUTO: 4 K/UL (ref 1.8–7.7)
NEUTROPHILS NFR BLD: 64.5 % (ref 38–73)
NEUTROPHILS NFR BLD: 65.2 % (ref 38–73)
NEUTROPHILS NFR BLD: 70.9 % (ref 38–73)
NRBC BLD-RTO: 0 /100 WBC
NUM UNITS TRANS PACKED RBC: NORMAL
NUM UNITS TRANS PACKED RBC: NORMAL
OB PNL STL: NEGATIVE
PHOSPHATE SERPL-MCNC: 3.4 MG/DL (ref 2.7–4.5)
PLATELET # BLD AUTO: 156 K/UL (ref 150–350)
PLATELET # BLD AUTO: 175 K/UL (ref 150–350)
PLATELET # BLD AUTO: 176 K/UL (ref 150–350)
PMV BLD AUTO: 11 FL (ref 9.2–12.9)
PMV BLD AUTO: 11.4 FL (ref 9.2–12.9)
PMV BLD AUTO: 11.4 FL (ref 9.2–12.9)
POCT GLUCOSE: 115 MG/DL (ref 70–110)
POCT GLUCOSE: 173 MG/DL (ref 70–110)
POTASSIUM SERPL-SCNC: 4.2 MMOL/L (ref 3.5–5.1)
PROT SERPL-MCNC: 6.2 G/DL (ref 6–8.4)
RBC # BLD AUTO: 2.63 M/UL (ref 4–5.4)
RBC # BLD AUTO: 2.75 M/UL (ref 4–5.4)
RBC # BLD AUTO: 3.25 M/UL (ref 4–5.4)
SODIUM SERPL-SCNC: 139 MMOL/L (ref 136–145)
WBC # BLD AUTO: 5.4 K/UL (ref 3.9–12.7)
WBC # BLD AUTO: 5.65 K/UL (ref 3.9–12.7)
WBC # BLD AUTO: 5.76 K/UL (ref 3.9–12.7)

## 2021-03-03 PROCEDURE — 36415 COLL VENOUS BLD VENIPUNCTURE: CPT | Performed by: HOSPITALIST

## 2021-03-03 PROCEDURE — 83735 ASSAY OF MAGNESIUM: CPT

## 2021-03-03 PROCEDURE — 25000003 PHARM REV CODE 250: Performed by: HOSPITALIST

## 2021-03-03 PROCEDURE — P9016 RBC LEUKOCYTES REDUCED: HCPCS | Performed by: PHYSICIAN ASSISTANT

## 2021-03-03 PROCEDURE — 63600175 PHARM REV CODE 636 W HCPCS: Performed by: HOSPITALIST

## 2021-03-03 PROCEDURE — 99239 HOSP IP/OBS DSCHRG MGMT >30: CPT | Mod: ,,, | Performed by: HOSPITALIST

## 2021-03-03 PROCEDURE — 36430 TRANSFUSION BLD/BLD COMPNT: CPT

## 2021-03-03 PROCEDURE — 82272 OCCULT BLD FECES 1-3 TESTS: CPT | Performed by: HOSPITALIST

## 2021-03-03 PROCEDURE — 85025 COMPLETE CBC W/AUTO DIFF WBC: CPT | Mod: 91 | Performed by: HOSPITALIST

## 2021-03-03 PROCEDURE — 85025 COMPLETE CBC W/AUTO DIFF WBC: CPT

## 2021-03-03 PROCEDURE — 80053 COMPREHEN METABOLIC PANEL: CPT

## 2021-03-03 PROCEDURE — 84100 ASSAY OF PHOSPHORUS: CPT

## 2021-03-03 PROCEDURE — 36415 COLL VENOUS BLD VENIPUNCTURE: CPT

## 2021-03-03 PROCEDURE — 99239 PR HOSPITAL DISCHARGE DAY,>30 MIN: ICD-10-PCS | Mod: ,,, | Performed by: HOSPITALIST

## 2021-03-03 RX ORDER — HYDROCODONE BITARTRATE AND ACETAMINOPHEN 500; 5 MG/1; MG/1
TABLET ORAL
Status: DISCONTINUED | OUTPATIENT
Start: 2021-03-03 | End: 2021-03-03 | Stop reason: HOSPADM

## 2021-03-03 RX ORDER — LISINOPRIL 20 MG/1
20 TABLET ORAL DAILY
Status: DISCONTINUED | OUTPATIENT
Start: 2021-03-04 | End: 2021-03-03 | Stop reason: HOSPADM

## 2021-03-03 RX ORDER — LIDOCAINE 50 MG/G
1 PATCH TOPICAL DAILY
Qty: 30 PATCH | Refills: 3 | Status: SHIPPED | OUTPATIENT
Start: 2021-03-03

## 2021-03-03 RX ORDER — FERROUS SULFATE 325(65) MG
325 TABLET ORAL
Qty: 30 TABLET | Refills: 3 | Status: SHIPPED | OUTPATIENT
Start: 2021-03-03

## 2021-03-03 RX ORDER — PANTOPRAZOLE SODIUM 40 MG/1
40 TABLET, DELAYED RELEASE ORAL DAILY
Qty: 30 TABLET | Refills: 11 | Status: SHIPPED | OUTPATIENT
Start: 2021-03-04 | End: 2022-03-04

## 2021-03-03 RX ADMIN — LISINOPRIL 10 MG: 2.5 TABLET ORAL at 08:03

## 2021-03-03 RX ADMIN — EZETIMIBE 10 MG: 10 TABLET ORAL at 08:03

## 2021-03-03 RX ADMIN — SODIUM CHLORIDE 125 MG: 9 INJECTION, SOLUTION INTRAVENOUS at 08:03

## 2021-03-03 RX ADMIN — Medication 5000 UNITS: at 08:03

## 2021-03-03 RX ADMIN — PANTOPRAZOLE SODIUM 40 MG: 40 TABLET, DELAYED RELEASE ORAL at 08:03

## 2021-03-03 RX ADMIN — POLYETHYLENE GLYCOL 3350 17 G: 17 POWDER, FOR SOLUTION ORAL at 09:03

## 2021-03-04 ENCOUNTER — CLINICAL SUPPORT (OUTPATIENT)
Dept: CARDIOLOGY | Facility: HOSPITAL | Age: 86
End: 2021-03-04
Payer: MEDICARE

## 2021-03-04 DIAGNOSIS — Z95.0 PRESENCE OF CARDIAC PACEMAKER: ICD-10-CM

## 2021-03-04 PROCEDURE — 93294 CARDIAC DEVICE CHECK - REMOTE: ICD-10-PCS | Mod: ,,, | Performed by: INTERNAL MEDICINE

## 2021-03-04 PROCEDURE — 93294 REM INTERROG EVL PM/LDLS PM: CPT | Mod: ,,, | Performed by: INTERNAL MEDICINE

## 2021-03-17 ENCOUNTER — TELEPHONE (OUTPATIENT)
Dept: HEMATOLOGY/ONCOLOGY | Facility: CLINIC | Age: 86
End: 2021-03-17

## 2021-03-17 ENCOUNTER — OFFICE VISIT (OUTPATIENT)
Dept: INTERNAL MEDICINE | Facility: CLINIC | Age: 86
End: 2021-03-17
Payer: MEDICARE

## 2021-03-17 ENCOUNTER — LAB VISIT (OUTPATIENT)
Dept: LAB | Facility: HOSPITAL | Age: 86
End: 2021-03-17
Attending: INTERNAL MEDICINE
Payer: MEDICARE

## 2021-03-17 VITALS
HEART RATE: 60 BPM | HEIGHT: 61 IN | OXYGEN SATURATION: 98 % | WEIGHT: 141.75 LBS | TEMPERATURE: 97 F | DIASTOLIC BLOOD PRESSURE: 60 MMHG | SYSTOLIC BLOOD PRESSURE: 120 MMHG | BODY MASS INDEX: 26.76 KG/M2

## 2021-03-17 DIAGNOSIS — D64.9 ANEMIA REQUIRING TRANSFUSIONS: ICD-10-CM

## 2021-03-17 DIAGNOSIS — D64.9 ANEMIA, UNSPECIFIED TYPE: Primary | ICD-10-CM

## 2021-03-17 DIAGNOSIS — D64.9 ANEMIA, UNSPECIFIED TYPE: ICD-10-CM

## 2021-03-17 DIAGNOSIS — D50.9 IRON DEFICIENCY ANEMIA, UNSPECIFIED IRON DEFICIENCY ANEMIA TYPE: ICD-10-CM

## 2021-03-17 LAB
BASOPHILS # BLD AUTO: 0.03 K/UL (ref 0–0.2)
BASOPHILS NFR BLD: 0.5 % (ref 0–1.9)
DIFFERENTIAL METHOD: ABNORMAL
EOSINOPHIL # BLD AUTO: 0.2 K/UL (ref 0–0.5)
EOSINOPHIL NFR BLD: 2.6 % (ref 0–8)
ERYTHROCYTE [DISTWIDTH] IN BLOOD BY AUTOMATED COUNT: 17.9 % (ref 11.5–14.5)
HCT VFR BLD AUTO: 25.9 % (ref 37–48.5)
HGB BLD-MCNC: 7.8 G/DL (ref 12–16)
IMM GRANULOCYTES # BLD AUTO: 0.02 K/UL (ref 0–0.04)
IMM GRANULOCYTES NFR BLD AUTO: 0.3 % (ref 0–0.5)
LYMPHOCYTES # BLD AUTO: 0.8 K/UL (ref 1–4.8)
LYMPHOCYTES NFR BLD: 12.4 % (ref 18–48)
MCH RBC QN AUTO: 26.1 PG (ref 27–31)
MCHC RBC AUTO-ENTMCNC: 30.1 G/DL (ref 32–36)
MCV RBC AUTO: 87 FL (ref 82–98)
MONOCYTES # BLD AUTO: 0.4 K/UL (ref 0.3–1)
MONOCYTES NFR BLD: 6.2 % (ref 4–15)
NEUTROPHILS # BLD AUTO: 4.8 K/UL (ref 1.8–7.7)
NEUTROPHILS NFR BLD: 78 % (ref 38–73)
NRBC BLD-RTO: 0 /100 WBC
PLATELET # BLD AUTO: 193 K/UL (ref 150–350)
PMV BLD AUTO: 12.1 FL (ref 9.2–12.9)
RBC # BLD AUTO: 2.99 M/UL (ref 4–5.4)
WBC # BLD AUTO: 6.15 K/UL (ref 3.9–12.7)

## 2021-03-17 PROCEDURE — 85025 COMPLETE CBC W/AUTO DIFF WBC: CPT | Performed by: INTERNAL MEDICINE

## 2021-03-17 PROCEDURE — 36415 COLL VENOUS BLD VENIPUNCTURE: CPT | Mod: PO | Performed by: INTERNAL MEDICINE

## 2021-03-17 PROCEDURE — 82728 ASSAY OF FERRITIN: CPT | Performed by: INTERNAL MEDICINE

## 2021-03-17 PROCEDURE — 99214 PR OFFICE/OUTPT VISIT, EST, LEVL IV, 30-39 MIN: ICD-10-PCS | Mod: S$PBB,,, | Performed by: INTERNAL MEDICINE

## 2021-03-17 PROCEDURE — 80053 COMPREHEN METABOLIC PANEL: CPT | Performed by: INTERNAL MEDICINE

## 2021-03-17 PROCEDURE — 99214 OFFICE O/P EST MOD 30 MIN: CPT | Mod: PBBFAC,PO | Performed by: INTERNAL MEDICINE

## 2021-03-17 PROCEDURE — 83880 ASSAY OF NATRIURETIC PEPTIDE: CPT | Performed by: INTERNAL MEDICINE

## 2021-03-17 PROCEDURE — 99999 PR PBB SHADOW E&M-EST. PATIENT-LVL IV: ICD-10-PCS | Mod: PBBFAC,,, | Performed by: INTERNAL MEDICINE

## 2021-03-17 PROCEDURE — 99214 OFFICE O/P EST MOD 30 MIN: CPT | Mod: S$PBB,,, | Performed by: INTERNAL MEDICINE

## 2021-03-17 PROCEDURE — 83540 ASSAY OF IRON: CPT | Performed by: INTERNAL MEDICINE

## 2021-03-17 PROCEDURE — 99999 PR PBB SHADOW E&M-EST. PATIENT-LVL IV: CPT | Mod: PBBFAC,,, | Performed by: INTERNAL MEDICINE

## 2021-03-18 ENCOUNTER — TELEPHONE (OUTPATIENT)
Dept: HEMATOLOGY/ONCOLOGY | Facility: CLINIC | Age: 86
End: 2021-03-18

## 2021-03-18 LAB
ALBUMIN SERPL BCP-MCNC: 3.4 G/DL (ref 3.5–5.2)
ALP SERPL-CCNC: 38 U/L (ref 55–135)
ALT SERPL W/O P-5'-P-CCNC: 10 U/L (ref 10–44)
ANION GAP SERPL CALC-SCNC: 9 MMOL/L (ref 8–16)
AST SERPL-CCNC: 13 U/L (ref 10–40)
BILIRUB SERPL-MCNC: 0.2 MG/DL (ref 0.1–1)
BNP SERPL-MCNC: 190 PG/ML (ref 0–99)
BUN SERPL-MCNC: 48 MG/DL (ref 8–23)
CALCIUM SERPL-MCNC: 9 MG/DL (ref 8.7–10.5)
CHLORIDE SERPL-SCNC: 108 MMOL/L (ref 95–110)
CO2 SERPL-SCNC: 21 MMOL/L (ref 23–29)
CREAT SERPL-MCNC: 1.3 MG/DL (ref 0.5–1.4)
EST. GFR  (AFRICAN AMERICAN): 42.6 ML/MIN/1.73 M^2
EST. GFR  (NON AFRICAN AMERICAN): 37 ML/MIN/1.73 M^2
FERRITIN SERPL-MCNC: 69 NG/ML (ref 20–300)
GLUCOSE SERPL-MCNC: 200 MG/DL (ref 70–110)
IRON SERPL-MCNC: 37 UG/DL (ref 30–160)
POTASSIUM SERPL-SCNC: 4.9 MMOL/L (ref 3.5–5.1)
PROT SERPL-MCNC: 6.2 G/DL (ref 6–8.4)
SATURATED IRON: 10 % (ref 20–50)
SODIUM SERPL-SCNC: 138 MMOL/L (ref 136–145)
TOTAL IRON BINDING CAPACITY: 373 UG/DL (ref 250–450)
TRANSFERRIN SERPL-MCNC: 252 MG/DL (ref 200–375)

## 2021-04-15 ENCOUNTER — OFFICE VISIT (OUTPATIENT)
Dept: HEMATOLOGY/ONCOLOGY | Facility: CLINIC | Age: 86
End: 2021-04-15
Payer: MEDICARE

## 2021-04-15 VITALS
SYSTOLIC BLOOD PRESSURE: 162 MMHG | BODY MASS INDEX: 26.66 KG/M2 | HEIGHT: 61 IN | OXYGEN SATURATION: 99 % | HEART RATE: 62 BPM | WEIGHT: 141.19 LBS | DIASTOLIC BLOOD PRESSURE: 68 MMHG | RESPIRATION RATE: 16 BRPM | TEMPERATURE: 98 F

## 2021-04-15 DIAGNOSIS — D50.9 IRON DEFICIENCY ANEMIA, UNSPECIFIED IRON DEFICIENCY ANEMIA TYPE: ICD-10-CM

## 2021-04-15 DIAGNOSIS — D64.9 ANEMIA REQUIRING TRANSFUSIONS: ICD-10-CM

## 2021-04-15 DIAGNOSIS — D64.9 ANEMIA, UNSPECIFIED TYPE: ICD-10-CM

## 2021-04-15 PROCEDURE — 99999 PR PBB SHADOW E&M-EST. PATIENT-LVL IV: CPT | Mod: PBBFAC,,, | Performed by: INTERNAL MEDICINE

## 2021-04-15 PROCEDURE — 99214 OFFICE O/P EST MOD 30 MIN: CPT | Mod: PBBFAC | Performed by: INTERNAL MEDICINE

## 2021-04-15 PROCEDURE — 99204 PR OFFICE/OUTPT VISIT, NEW, LEVL IV, 45-59 MIN: ICD-10-PCS | Mod: S$PBB,,, | Performed by: INTERNAL MEDICINE

## 2021-04-15 PROCEDURE — 99999 PR PBB SHADOW E&M-EST. PATIENT-LVL IV: ICD-10-PCS | Mod: PBBFAC,,, | Performed by: INTERNAL MEDICINE

## 2021-04-15 PROCEDURE — 99204 OFFICE O/P NEW MOD 45 MIN: CPT | Mod: S$PBB,,, | Performed by: INTERNAL MEDICINE

## 2021-04-15 RX ORDER — HEPARIN 100 UNIT/ML
500 SYRINGE INTRAVENOUS
Status: CANCELLED | OUTPATIENT
Start: 2021-04-22

## 2021-04-15 RX ORDER — SODIUM CHLORIDE 0.9 % (FLUSH) 0.9 %
10 SYRINGE (ML) INJECTION
Status: CANCELLED | OUTPATIENT
Start: 2021-04-29

## 2021-04-15 RX ORDER — SODIUM CHLORIDE 0.9 % (FLUSH) 0.9 %
10 SYRINGE (ML) INJECTION
Status: CANCELLED | OUTPATIENT
Start: 2021-04-22

## 2021-04-15 RX ORDER — HEPARIN 100 UNIT/ML
500 SYRINGE INTRAVENOUS
Status: CANCELLED | OUTPATIENT
Start: 2021-04-29

## 2021-04-19 ENCOUNTER — TELEPHONE (OUTPATIENT)
Dept: HEMATOLOGY/ONCOLOGY | Facility: CLINIC | Age: 86
End: 2021-04-19

## 2021-04-19 DIAGNOSIS — Z95.0 PRESENCE OF CARDIAC PACEMAKER: Primary | ICD-10-CM

## 2021-04-23 ENCOUNTER — INFUSION (OUTPATIENT)
Dept: INFUSION THERAPY | Facility: HOSPITAL | Age: 86
End: 2021-04-23
Payer: MEDICARE

## 2021-04-23 VITALS
DIASTOLIC BLOOD PRESSURE: 70 MMHG | SYSTOLIC BLOOD PRESSURE: 132 MMHG | RESPIRATION RATE: 18 BRPM | HEART RATE: 68 BPM | TEMPERATURE: 99 F | OXYGEN SATURATION: 99 %

## 2021-04-23 DIAGNOSIS — D50.8 IRON DEFICIENCY ANEMIA SECONDARY TO INADEQUATE DIETARY IRON INTAKE: Primary | ICD-10-CM

## 2021-04-23 PROCEDURE — 25000003 PHARM REV CODE 250: Performed by: INTERNAL MEDICINE

## 2021-04-23 PROCEDURE — 96365 THER/PROPH/DIAG IV INF INIT: CPT

## 2021-04-23 PROCEDURE — 63600175 PHARM REV CODE 636 W HCPCS: Mod: JG | Performed by: INTERNAL MEDICINE

## 2021-04-23 RX ORDER — HEPARIN 100 UNIT/ML
500 SYRINGE INTRAVENOUS
Status: DISCONTINUED | OUTPATIENT
Start: 2021-04-23 | End: 2021-04-23 | Stop reason: HOSPADM

## 2021-04-23 RX ORDER — SODIUM CHLORIDE 0.9 % (FLUSH) 0.9 %
10 SYRINGE (ML) INJECTION
Status: DISCONTINUED | OUTPATIENT
Start: 2021-04-23 | End: 2021-04-23 | Stop reason: HOSPADM

## 2021-04-23 RX ADMIN — FERRIC CARBOXYMALTOSE INJECTION 750 MG: 50 INJECTION, SOLUTION INTRAVENOUS at 11:04

## 2021-04-23 RX ADMIN — SODIUM CHLORIDE: 9 INJECTION, SOLUTION INTRAVENOUS at 11:04

## 2021-04-27 ENCOUNTER — PATIENT OUTREACH (OUTPATIENT)
Dept: ADMINISTRATIVE | Facility: OTHER | Age: 86
End: 2021-04-27

## 2021-04-30 ENCOUNTER — INFUSION (OUTPATIENT)
Dept: INFUSION THERAPY | Facility: HOSPITAL | Age: 86
End: 2021-04-30
Payer: MEDICARE

## 2021-04-30 VITALS
TEMPERATURE: 98 F | RESPIRATION RATE: 18 BRPM | OXYGEN SATURATION: 100 % | HEART RATE: 60 BPM | SYSTOLIC BLOOD PRESSURE: 133 MMHG | DIASTOLIC BLOOD PRESSURE: 63 MMHG

## 2021-04-30 DIAGNOSIS — D50.8 IRON DEFICIENCY ANEMIA SECONDARY TO INADEQUATE DIETARY IRON INTAKE: Primary | ICD-10-CM

## 2021-04-30 PROCEDURE — 63600175 PHARM REV CODE 636 W HCPCS: Mod: JG | Performed by: INTERNAL MEDICINE

## 2021-04-30 PROCEDURE — 25000003 PHARM REV CODE 250: Performed by: INTERNAL MEDICINE

## 2021-04-30 PROCEDURE — 96365 THER/PROPH/DIAG IV INF INIT: CPT

## 2021-04-30 RX ORDER — SODIUM CHLORIDE 0.9 % (FLUSH) 0.9 %
10 SYRINGE (ML) INJECTION
Status: DISCONTINUED | OUTPATIENT
Start: 2021-04-30 | End: 2021-04-30 | Stop reason: HOSPADM

## 2021-04-30 RX ORDER — HEPARIN 100 UNIT/ML
500 SYRINGE INTRAVENOUS
Status: DISCONTINUED | OUTPATIENT
Start: 2021-04-30 | End: 2021-04-30 | Stop reason: HOSPADM

## 2021-04-30 RX ADMIN — FERRIC CARBOXYMALTOSE INJECTION 750 MG: 50 INJECTION, SOLUTION INTRAVENOUS at 09:04

## 2021-04-30 RX ADMIN — SODIUM CHLORIDE: 0.9 INJECTION, SOLUTION INTRAVENOUS at 09:04

## 2021-06-02 ENCOUNTER — CLINICAL SUPPORT (OUTPATIENT)
Dept: CARDIOLOGY | Facility: HOSPITAL | Age: 86
End: 2021-06-02
Payer: MEDICARE

## 2021-06-02 DIAGNOSIS — R55 SYNCOPE AND COLLAPSE: ICD-10-CM

## 2021-06-02 DIAGNOSIS — Z95.0 PRESENCE OF CARDIAC PACEMAKER: ICD-10-CM

## 2021-06-02 PROCEDURE — 93294 CARDIAC DEVICE CHECK - REMOTE: ICD-10-PCS | Mod: ,,, | Performed by: INTERNAL MEDICINE

## 2021-06-02 PROCEDURE — 93294 REM INTERROG EVL PM/LDLS PM: CPT | Mod: ,,, | Performed by: INTERNAL MEDICINE

## 2021-06-28 ENCOUNTER — TELEPHONE (OUTPATIENT)
Dept: INTERNAL MEDICINE | Facility: CLINIC | Age: 86
End: 2021-06-28

## 2021-06-28 DIAGNOSIS — D64.9 ANEMIA REQUIRING TRANSFUSIONS: Primary | ICD-10-CM

## 2021-07-01 ENCOUNTER — LAB VISIT (OUTPATIENT)
Dept: LAB | Facility: HOSPITAL | Age: 86
End: 2021-07-01
Attending: INTERNAL MEDICINE
Payer: MEDICARE

## 2021-07-01 DIAGNOSIS — D64.9 ANEMIA REQUIRING TRANSFUSIONS: ICD-10-CM

## 2021-07-01 LAB
ALBUMIN SERPL BCP-MCNC: 3.6 G/DL (ref 3.5–5.2)
ALP SERPL-CCNC: 39 U/L (ref 55–135)
ALT SERPL W/O P-5'-P-CCNC: 9 U/L (ref 10–44)
ANION GAP SERPL CALC-SCNC: 10 MMOL/L (ref 8–16)
AST SERPL-CCNC: 13 U/L (ref 10–40)
BASOPHILS # BLD AUTO: 0.02 K/UL (ref 0–0.2)
BASOPHILS NFR BLD: 0.4 % (ref 0–1.9)
BILIRUB SERPL-MCNC: 0.2 MG/DL (ref 0.1–1)
BNP SERPL-MCNC: 141 PG/ML (ref 0–99)
BUN SERPL-MCNC: 51 MG/DL (ref 8–23)
CALCIUM SERPL-MCNC: 9.9 MG/DL (ref 8.7–10.5)
CHLORIDE SERPL-SCNC: 107 MMOL/L (ref 95–110)
CO2 SERPL-SCNC: 20 MMOL/L (ref 23–29)
CREAT SERPL-MCNC: 1.1 MG/DL (ref 0.5–1.4)
DIFFERENTIAL METHOD: ABNORMAL
EOSINOPHIL # BLD AUTO: 0.2 K/UL (ref 0–0.5)
EOSINOPHIL NFR BLD: 3.6 % (ref 0–8)
ERYTHROCYTE [DISTWIDTH] IN BLOOD BY AUTOMATED COUNT: 15.6 % (ref 11.5–14.5)
EST. GFR  (AFRICAN AMERICAN): 51.8 ML/MIN/1.73 M^2
EST. GFR  (NON AFRICAN AMERICAN): 44.9 ML/MIN/1.73 M^2
FERRITIN SERPL-MCNC: 144 NG/ML (ref 20–300)
GLUCOSE SERPL-MCNC: 147 MG/DL (ref 70–110)
HCT VFR BLD AUTO: 25.4 % (ref 37–48.5)
HGB BLD-MCNC: 7.9 G/DL (ref 12–16)
IMM GRANULOCYTES # BLD AUTO: 0.01 K/UL (ref 0–0.04)
IMM GRANULOCYTES NFR BLD AUTO: 0.2 % (ref 0–0.5)
IRON SERPL-MCNC: 47 UG/DL (ref 30–160)
LYMPHOCYTES # BLD AUTO: 1.2 K/UL (ref 1–4.8)
LYMPHOCYTES NFR BLD: 21.7 % (ref 18–48)
MCH RBC QN AUTO: 29.2 PG (ref 27–31)
MCHC RBC AUTO-ENTMCNC: 31.1 G/DL (ref 32–36)
MCV RBC AUTO: 94 FL (ref 82–98)
MONOCYTES # BLD AUTO: 0.4 K/UL (ref 0.3–1)
MONOCYTES NFR BLD: 6.6 % (ref 4–15)
NEUTROPHILS # BLD AUTO: 3.8 K/UL (ref 1.8–7.7)
NEUTROPHILS NFR BLD: 67.5 % (ref 38–73)
NRBC BLD-RTO: 0 /100 WBC
PLATELET # BLD AUTO: 167 K/UL (ref 150–450)
PMV BLD AUTO: 12.1 FL (ref 9.2–12.9)
POTASSIUM SERPL-SCNC: 4.9 MMOL/L (ref 3.5–5.1)
PROT SERPL-MCNC: 6.3 G/DL (ref 6–8.4)
RBC # BLD AUTO: 2.71 M/UL (ref 4–5.4)
SATURATED IRON: 16 % (ref 20–50)
SODIUM SERPL-SCNC: 137 MMOL/L (ref 136–145)
TOTAL IRON BINDING CAPACITY: 290 UG/DL (ref 250–450)
TRANSFERRIN SERPL-MCNC: 196 MG/DL (ref 200–375)
WBC # BLD AUTO: 5.57 K/UL (ref 3.9–12.7)

## 2021-07-01 PROCEDURE — 85025 COMPLETE CBC W/AUTO DIFF WBC: CPT | Performed by: INTERNAL MEDICINE

## 2021-07-01 PROCEDURE — 83880 ASSAY OF NATRIURETIC PEPTIDE: CPT | Performed by: INTERNAL MEDICINE

## 2021-07-01 PROCEDURE — 36415 COLL VENOUS BLD VENIPUNCTURE: CPT | Mod: PO | Performed by: INTERNAL MEDICINE

## 2021-07-01 PROCEDURE — 83540 ASSAY OF IRON: CPT | Performed by: INTERNAL MEDICINE

## 2021-07-01 PROCEDURE — 80053 COMPREHEN METABOLIC PANEL: CPT | Performed by: INTERNAL MEDICINE

## 2021-07-01 PROCEDURE — 82728 ASSAY OF FERRITIN: CPT | Performed by: INTERNAL MEDICINE

## 2021-07-06 ENCOUNTER — OFFICE VISIT (OUTPATIENT)
Dept: INTERNAL MEDICINE | Facility: CLINIC | Age: 86
End: 2021-07-06
Payer: MEDICARE

## 2021-07-06 DIAGNOSIS — E11.3599 TYPE 2 DIABETES MELLITUS WITH PROLIFERATIVE RETINOPATHY WITHOUT MACULAR EDEMA, WITHOUT LONG-TERM CURRENT USE OF INSULIN, UNSPECIFIED LATERALITY: ICD-10-CM

## 2021-07-06 DIAGNOSIS — R53.83 FATIGUE, UNSPECIFIED TYPE: ICD-10-CM

## 2021-07-06 DIAGNOSIS — E55.9 VITAMIN D DEFICIENCY: ICD-10-CM

## 2021-07-06 DIAGNOSIS — D50.9 IRON DEFICIENCY ANEMIA, UNSPECIFIED IRON DEFICIENCY ANEMIA TYPE: ICD-10-CM

## 2021-07-06 DIAGNOSIS — D64.9 ANEMIA, UNSPECIFIED TYPE: Primary | ICD-10-CM

## 2021-07-06 DIAGNOSIS — E08.22 DIABETES MELLITUS DUE TO UNDERLYING CONDITION WITH DIABETIC CHRONIC KIDNEY DISEASE, UNSPECIFIED CKD STAGE, UNSPECIFIED WHETHER LONG TERM INSULIN USE: ICD-10-CM

## 2021-07-06 PROCEDURE — 99214 OFFICE O/P EST MOD 30 MIN: CPT | Mod: 95,,, | Performed by: INTERNAL MEDICINE

## 2021-07-06 PROCEDURE — 99214 PR OFFICE/OUTPT VISIT, EST, LEVL IV, 30-39 MIN: ICD-10-PCS | Mod: 95,,, | Performed by: INTERNAL MEDICINE

## 2021-07-22 ENCOUNTER — TELEPHONE (OUTPATIENT)
Dept: ELECTROPHYSIOLOGY | Facility: CLINIC | Age: 86
End: 2021-07-22

## 2021-07-28 ENCOUNTER — TELEPHONE (OUTPATIENT)
Dept: INTERNAL MEDICINE | Facility: CLINIC | Age: 86
End: 2021-07-28

## 2021-08-06 ENCOUNTER — TELEPHONE (OUTPATIENT)
Dept: INTERNAL MEDICINE | Facility: CLINIC | Age: 86
End: 2021-08-06

## 2021-09-27 ENCOUNTER — TELEPHONE (OUTPATIENT)
Dept: INTERNAL MEDICINE | Facility: CLINIC | Age: 86
End: 2021-09-27

## 2021-09-28 ENCOUNTER — TELEPHONE (OUTPATIENT)
Dept: ELECTROPHYSIOLOGY | Facility: CLINIC | Age: 86
End: 2021-09-28

## 2021-09-28 ENCOUNTER — TELEPHONE (OUTPATIENT)
Dept: INTERNAL MEDICINE | Facility: CLINIC | Age: 86
End: 2021-09-28

## 2021-09-30 ENCOUNTER — LAB VISIT (OUTPATIENT)
Dept: LAB | Facility: HOSPITAL | Age: 86
End: 2021-09-30
Attending: INTERNAL MEDICINE
Payer: MEDICARE

## 2021-09-30 DIAGNOSIS — D50.9 IRON DEFICIENCY ANEMIA, UNSPECIFIED IRON DEFICIENCY ANEMIA TYPE: ICD-10-CM

## 2021-09-30 DIAGNOSIS — E11.3599 TYPE 2 DIABETES MELLITUS WITH PROLIFERATIVE RETINOPATHY WITHOUT MACULAR EDEMA, WITHOUT LONG-TERM CURRENT USE OF INSULIN, UNSPECIFIED LATERALITY: ICD-10-CM

## 2021-09-30 DIAGNOSIS — D64.9 ANEMIA, UNSPECIFIED TYPE: ICD-10-CM

## 2021-09-30 DIAGNOSIS — E08.22 DIABETES MELLITUS DUE TO UNDERLYING CONDITION WITH DIABETIC CHRONIC KIDNEY DISEASE, UNSPECIFIED CKD STAGE, UNSPECIFIED WHETHER LONG TERM INSULIN USE: ICD-10-CM

## 2021-09-30 DIAGNOSIS — E55.9 VITAMIN D DEFICIENCY: ICD-10-CM

## 2021-09-30 DIAGNOSIS — R53.83 FATIGUE, UNSPECIFIED TYPE: ICD-10-CM

## 2021-09-30 LAB
25(OH)D3+25(OH)D2 SERPL-MCNC: 91 NG/ML (ref 30–96)
ALBUMIN SERPL BCP-MCNC: 3.3 G/DL (ref 3.5–5.2)
ALP SERPL-CCNC: 67 U/L (ref 55–135)
ALT SERPL W/O P-5'-P-CCNC: 45 U/L (ref 10–44)
ANION GAP SERPL CALC-SCNC: 11 MMOL/L (ref 8–16)
AST SERPL-CCNC: 16 U/L (ref 10–40)
BASOPHILS # BLD AUTO: 0.02 K/UL (ref 0–0.2)
BASOPHILS NFR BLD: 0.4 % (ref 0–1.9)
BILIRUB SERPL-MCNC: 0.3 MG/DL (ref 0.1–1)
BUN SERPL-MCNC: 30 MG/DL (ref 8–23)
CALCIUM SERPL-MCNC: 10 MG/DL (ref 8.7–10.5)
CHLORIDE SERPL-SCNC: 99 MMOL/L (ref 95–110)
CO2 SERPL-SCNC: 21 MMOL/L (ref 23–29)
CREAT SERPL-MCNC: 1 MG/DL (ref 0.5–1.4)
DIFFERENTIAL METHOD: ABNORMAL
EOSINOPHIL # BLD AUTO: 0.3 K/UL (ref 0–0.5)
EOSINOPHIL NFR BLD: 5.5 % (ref 0–8)
ERYTHROCYTE [DISTWIDTH] IN BLOOD BY AUTOMATED COUNT: 15.1 % (ref 11.5–14.5)
EST. GFR  (AFRICAN AMERICAN): 58.1 ML/MIN/1.73 M^2
EST. GFR  (NON AFRICAN AMERICAN): 50.4 ML/MIN/1.73 M^2
ESTIMATED AVG GLUCOSE: 157 MG/DL (ref 68–131)
FERRITIN SERPL-MCNC: 43 NG/ML (ref 20–300)
GLUCOSE SERPL-MCNC: 204 MG/DL (ref 70–110)
HBA1C MFR BLD: 7.1 % (ref 4–5.6)
HCT VFR BLD AUTO: 29.8 % (ref 37–48.5)
HGB BLD-MCNC: 9.1 G/DL (ref 12–16)
IMM GRANULOCYTES # BLD AUTO: 0.03 K/UL (ref 0–0.04)
IMM GRANULOCYTES NFR BLD AUTO: 0.6 % (ref 0–0.5)
IRON SERPL-MCNC: 26 UG/DL (ref 30–160)
LYMPHOCYTES # BLD AUTO: 0.9 K/UL (ref 1–4.8)
LYMPHOCYTES NFR BLD: 17.3 % (ref 18–48)
MCH RBC QN AUTO: 25.3 PG (ref 27–31)
MCHC RBC AUTO-ENTMCNC: 30.5 G/DL (ref 32–36)
MCV RBC AUTO: 83 FL (ref 82–98)
MONOCYTES # BLD AUTO: 0.4 K/UL (ref 0.3–1)
MONOCYTES NFR BLD: 7.8 % (ref 4–15)
NEUTROPHILS # BLD AUTO: 3.5 K/UL (ref 1.8–7.7)
NEUTROPHILS NFR BLD: 68.4 % (ref 38–73)
NRBC BLD-RTO: 0 /100 WBC
PLATELET # BLD AUTO: 211 K/UL (ref 150–450)
PMV BLD AUTO: 11.1 FL (ref 9.2–12.9)
POTASSIUM SERPL-SCNC: 4.4 MMOL/L (ref 3.5–5.1)
PROT SERPL-MCNC: 6.6 G/DL (ref 6–8.4)
RBC # BLD AUTO: 3.6 M/UL (ref 4–5.4)
SATURATED IRON: 7 % (ref 20–50)
SODIUM SERPL-SCNC: 131 MMOL/L (ref 136–145)
TOTAL IRON BINDING CAPACITY: 354 UG/DL (ref 250–450)
TRANSFERRIN SERPL-MCNC: 239 MG/DL (ref 200–375)
TSH SERPL DL<=0.005 MIU/L-ACNC: 1.72 UIU/ML (ref 0.4–4)
WBC # BLD AUTO: 5.13 K/UL (ref 3.9–12.7)

## 2021-09-30 PROCEDURE — 83036 HEMOGLOBIN GLYCOSYLATED A1C: CPT | Performed by: INTERNAL MEDICINE

## 2021-09-30 PROCEDURE — 82728 ASSAY OF FERRITIN: CPT | Performed by: INTERNAL MEDICINE

## 2021-09-30 PROCEDURE — 84443 ASSAY THYROID STIM HORMONE: CPT | Performed by: INTERNAL MEDICINE

## 2021-09-30 PROCEDURE — 82306 VITAMIN D 25 HYDROXY: CPT | Performed by: INTERNAL MEDICINE

## 2021-09-30 PROCEDURE — 80053 COMPREHEN METABOLIC PANEL: CPT | Performed by: INTERNAL MEDICINE

## 2021-09-30 PROCEDURE — 36415 COLL VENOUS BLD VENIPUNCTURE: CPT | Mod: PO | Performed by: INTERNAL MEDICINE

## 2021-09-30 PROCEDURE — 84466 ASSAY OF TRANSFERRIN: CPT | Performed by: INTERNAL MEDICINE

## 2021-09-30 PROCEDURE — 85025 COMPLETE CBC W/AUTO DIFF WBC: CPT | Performed by: INTERNAL MEDICINE

## 2021-10-01 ENCOUNTER — TELEPHONE (OUTPATIENT)
Dept: ELECTROPHYSIOLOGY | Facility: CLINIC | Age: 86
End: 2021-10-01

## 2021-10-05 ENCOUNTER — TELEPHONE (OUTPATIENT)
Dept: ELECTROPHYSIOLOGY | Facility: CLINIC | Age: 86
End: 2021-10-05

## 2021-10-07 ENCOUNTER — CLINICAL SUPPORT (OUTPATIENT)
Dept: CARDIOLOGY | Facility: HOSPITAL | Age: 86
End: 2021-10-07
Payer: MEDICARE

## 2021-10-07 ENCOUNTER — OFFICE VISIT (OUTPATIENT)
Dept: INTERNAL MEDICINE | Facility: CLINIC | Age: 86
End: 2021-10-07
Payer: MEDICARE

## 2021-10-07 DIAGNOSIS — D64.9 ANEMIA, UNSPECIFIED TYPE: ICD-10-CM

## 2021-10-07 DIAGNOSIS — K59.00 CONSTIPATION, UNSPECIFIED CONSTIPATION TYPE: ICD-10-CM

## 2021-10-07 DIAGNOSIS — I49.5 SICK SINUS SYNDROME: ICD-10-CM

## 2021-10-07 DIAGNOSIS — S72.009D CLOSED FRACTURE OF HIP WITH ROUTINE HEALING, UNSPECIFIED LATERALITY, SUBSEQUENT ENCOUNTER: ICD-10-CM

## 2021-10-07 DIAGNOSIS — E87.1 HYPONATREMIA: Primary | ICD-10-CM

## 2021-10-07 DIAGNOSIS — Z95.0 PRESENCE OF CARDIAC PACEMAKER: ICD-10-CM

## 2021-10-07 PROCEDURE — 93294 REM INTERROG EVL PM/LDLS PM: CPT | Mod: ,,, | Performed by: INTERNAL MEDICINE

## 2021-10-07 PROCEDURE — 99214 PR OFFICE/OUTPT VISIT, EST, LEVL IV, 30-39 MIN: ICD-10-PCS | Mod: 95,,, | Performed by: INTERNAL MEDICINE

## 2021-10-07 PROCEDURE — 99214 OFFICE O/P EST MOD 30 MIN: CPT | Mod: 95,,, | Performed by: INTERNAL MEDICINE

## 2021-10-07 PROCEDURE — 93294 CARDIAC DEVICE CHECK - REMOTE: ICD-10-PCS | Mod: ,,, | Performed by: INTERNAL MEDICINE

## 2021-10-12 ENCOUNTER — TELEPHONE (OUTPATIENT)
Dept: INTERNAL MEDICINE | Facility: CLINIC | Age: 86
End: 2021-10-12

## 2021-10-19 ENCOUNTER — TELEPHONE (OUTPATIENT)
Dept: INTERNAL MEDICINE | Facility: CLINIC | Age: 86
End: 2021-10-19

## 2021-10-26 ENCOUNTER — TELEPHONE (OUTPATIENT)
Dept: INTERNAL MEDICINE | Facility: CLINIC | Age: 86
End: 2021-10-26
Payer: MEDICARE

## 2021-10-26 RX ORDER — QUETIAPINE FUMARATE 25 MG/1
TABLET, FILM COATED ORAL
Qty: 45 TABLET | Refills: 1 | Status: SHIPPED | OUTPATIENT
Start: 2021-10-26 | End: 2021-12-09

## 2021-11-02 ENCOUNTER — DOCUMENT SCAN (OUTPATIENT)
Dept: HOME HEALTH SERVICES | Facility: HOSPITAL | Age: 86
End: 2021-11-02
Payer: MEDICARE

## 2021-12-06 ENCOUNTER — TELEPHONE (OUTPATIENT)
Dept: INTERNAL MEDICINE | Facility: CLINIC | Age: 86
End: 2021-12-06
Payer: MEDICARE

## 2021-12-07 RX ORDER — FUROSEMIDE 20 MG/1
20 TABLET ORAL DAILY
Qty: 3 TABLET | Refills: 0 | Status: ON HOLD | OUTPATIENT
Start: 2021-12-07 | End: 2022-04-16 | Stop reason: HOSPADM

## 2021-12-07 RX ORDER — LISINOPRIL 10 MG/1
10 TABLET ORAL 2 TIMES DAILY
Qty: 180 TABLET | Refills: 3 | Status: SHIPPED | OUTPATIENT
Start: 2021-12-07 | End: 2022-12-07

## 2021-12-09 ENCOUNTER — CLINICAL SUPPORT (OUTPATIENT)
Dept: INTERNAL MEDICINE | Facility: CLINIC | Age: 86
End: 2021-12-09
Payer: MEDICARE

## 2021-12-09 PROCEDURE — G0008 ADMIN INFLUENZA VIRUS VAC: HCPCS | Mod: PBBFAC,PO

## 2021-12-09 PROCEDURE — 90694 VACC AIIV4 NO PRSRV 0.5ML IM: CPT | Mod: PBBFAC,PO

## 2021-12-09 RX ORDER — SENNOSIDES 8.6 MG/1
1 TABLET ORAL DAILY PRN
Qty: 90 TABLET | Refills: 3 | Status: SHIPPED | OUTPATIENT
Start: 2021-12-09

## 2021-12-09 RX ORDER — QUETIAPINE FUMARATE 25 MG/1
25 TABLET, FILM COATED ORAL 2 TIMES DAILY PRN
Qty: 60 TABLET | Refills: 11 | Status: SHIPPED | OUTPATIENT
Start: 2021-12-09 | End: 2022-04-30

## 2021-12-18 ENCOUNTER — HOSPITAL ENCOUNTER (INPATIENT)
Facility: HOSPITAL | Age: 86
LOS: 1 days | Discharge: HOME | DRG: 194 | End: 2021-12-19
Attending: EMERGENCY MEDICINE | Admitting: STUDENT IN AN ORGANIZED HEALTH CARE EDUCATION/TRAINING PROGRAM
Payer: MEDICARE

## 2021-12-18 DIAGNOSIS — R79.89 ELEVATED BRAIN NATRIURETIC PEPTIDE (BNP) LEVEL: ICD-10-CM

## 2021-12-18 DIAGNOSIS — R07.9 CHEST PAIN: ICD-10-CM

## 2021-12-18 DIAGNOSIS — J18.9 PNEUMONIA DUE TO INFECTIOUS ORGANISM, UNSPECIFIED LATERALITY, UNSPECIFIED PART OF LUNG: Primary | ICD-10-CM

## 2021-12-18 DIAGNOSIS — R50.9 FEVER: ICD-10-CM

## 2021-12-18 DIAGNOSIS — R79.89 ELEVATED PROCALCITONIN: ICD-10-CM

## 2021-12-18 DIAGNOSIS — D72.829 LEUKOCYTOSIS, UNSPECIFIED TYPE: ICD-10-CM

## 2021-12-18 PROBLEM — F03.918 DEMENTIA WITH BEHAVIORAL DISTURBANCE: Status: ACTIVE | Noted: 2021-12-18

## 2021-12-18 LAB
ALBUMIN SERPL BCP-MCNC: 3.6 G/DL (ref 3.5–5.2)
ALP SERPL-CCNC: 52 U/L (ref 55–135)
ALT SERPL W/O P-5'-P-CCNC: 24 U/L (ref 10–44)
ANION GAP SERPL CALC-SCNC: 10 MMOL/L (ref 8–16)
AST SERPL-CCNC: 27 U/L (ref 10–40)
BACTERIA #/AREA URNS AUTO: NORMAL /HPF
BASOPHILS # BLD AUTO: 0.01 K/UL (ref 0–0.2)
BASOPHILS NFR BLD: 0.1 % (ref 0–1.9)
BILIRUB SERPL-MCNC: 0.5 MG/DL (ref 0.1–1)
BILIRUB UR QL STRIP: NEGATIVE
BNP SERPL-MCNC: 288 PG/ML (ref 0–99)
BUN SERPL-MCNC: 31 MG/DL (ref 8–23)
CALCIUM SERPL-MCNC: 9.5 MG/DL (ref 8.7–10.5)
CHLORIDE SERPL-SCNC: 101 MMOL/L (ref 95–110)
CLARITY UR REFRACT.AUTO: CLEAR
CO2 SERPL-SCNC: 23 MMOL/L (ref 23–29)
COLOR UR AUTO: YELLOW
CREAT SERPL-MCNC: 1.1 MG/DL (ref 0.5–1.4)
CTP QC/QA: YES
DIFFERENTIAL METHOD: ABNORMAL
EOSINOPHIL # BLD AUTO: 0 K/UL (ref 0–0.5)
EOSINOPHIL NFR BLD: 0.1 % (ref 0–8)
ERYTHROCYTE [DISTWIDTH] IN BLOOD BY AUTOMATED COUNT: 16.4 % (ref 11.5–14.5)
EST. GFR  (AFRICAN AMERICAN): 51.8 ML/MIN/1.73 M^2
EST. GFR  (NON AFRICAN AMERICAN): 44.9 ML/MIN/1.73 M^2
GLUCOSE SERPL-MCNC: 209 MG/DL (ref 70–110)
GLUCOSE UR QL STRIP: NEGATIVE
HCT VFR BLD AUTO: 28.7 % (ref 37–48.5)
HGB BLD-MCNC: 8.9 G/DL (ref 12–16)
HGB UR QL STRIP: NEGATIVE
IMM GRANULOCYTES # BLD AUTO: 0.04 K/UL (ref 0–0.04)
IMM GRANULOCYTES NFR BLD AUTO: 0.3 % (ref 0–0.5)
INFLUENZA A, MOLECULAR: NEGATIVE
INFLUENZA B, MOLECULAR: NEGATIVE
KETONES UR QL STRIP: NEGATIVE
LACTATE SERPL-SCNC: 1.6 MMOL/L (ref 0.5–2.2)
LEUKOCYTE ESTERASE UR QL STRIP: ABNORMAL
LIPASE SERPL-CCNC: 27 U/L (ref 4–60)
LYMPHOCYTES # BLD AUTO: 0.6 K/UL (ref 1–4.8)
LYMPHOCYTES NFR BLD: 4.2 % (ref 18–48)
MCH RBC QN AUTO: 25.5 PG (ref 27–31)
MCHC RBC AUTO-ENTMCNC: 31 G/DL (ref 32–36)
MCV RBC AUTO: 82 FL (ref 82–98)
MICROSCOPIC COMMENT: NORMAL
MONOCYTES # BLD AUTO: 0.5 K/UL (ref 0.3–1)
MONOCYTES NFR BLD: 3.5 % (ref 4–15)
NEUTROPHILS # BLD AUTO: 12.5 K/UL (ref 1.8–7.7)
NEUTROPHILS NFR BLD: 91.8 % (ref 38–73)
NITRITE UR QL STRIP: NEGATIVE
NRBC BLD-RTO: 0 /100 WBC
PH UR STRIP: 5 [PH] (ref 5–8)
PLATELET # BLD AUTO: 187 K/UL (ref 150–450)
PMV BLD AUTO: 11.2 FL (ref 9.2–12.9)
POCT GLUCOSE: 193 MG/DL (ref 70–110)
POTASSIUM SERPL-SCNC: 4.3 MMOL/L (ref 3.5–5.1)
PROCALCITONIN SERPL IA-MCNC: 0.26 NG/ML
PROT SERPL-MCNC: 6.7 G/DL (ref 6–8.4)
PROT UR QL STRIP: NEGATIVE
RBC # BLD AUTO: 3.49 M/UL (ref 4–5.4)
RBC #/AREA URNS AUTO: 3 /HPF (ref 0–4)
SARS-COV-2 RDRP RESP QL NAA+PROBE: NEGATIVE
SODIUM SERPL-SCNC: 134 MMOL/L (ref 136–145)
SP GR UR STRIP: 1.01 (ref 1–1.03)
SPECIMEN SOURCE: NORMAL
SQUAMOUS #/AREA URNS AUTO: 0 /HPF
TROPONIN I SERPL DL<=0.01 NG/ML-MCNC: 0.02 NG/ML (ref 0–0.03)
URN SPEC COLLECT METH UR: ABNORMAL
WBC # BLD AUTO: 13.61 K/UL (ref 3.9–12.7)
WBC #/AREA URNS AUTO: 3 /HPF (ref 0–5)

## 2021-12-18 PROCEDURE — 85025 COMPLETE CBC W/AUTO DIFF WBC: CPT | Performed by: PHYSICIAN ASSISTANT

## 2021-12-18 PROCEDURE — 99285 EMERGENCY DEPT VISIT HI MDM: CPT | Mod: 25

## 2021-12-18 PROCEDURE — 84484 ASSAY OF TROPONIN QUANT: CPT | Performed by: PHYSICIAN ASSISTANT

## 2021-12-18 PROCEDURE — 81001 URINALYSIS AUTO W/SCOPE: CPT | Performed by: PHYSICIAN ASSISTANT

## 2021-12-18 PROCEDURE — U0002 COVID-19 LAB TEST NON-CDC: HCPCS | Performed by: PHYSICIAN ASSISTANT

## 2021-12-18 PROCEDURE — 93010 ELECTROCARDIOGRAM REPORT: CPT | Mod: ,,, | Performed by: INTERNAL MEDICINE

## 2021-12-18 PROCEDURE — 99285 PR EMERGENCY DEPT VISIT,LEVEL V: ICD-10-PCS | Mod: CS,,, | Performed by: EMERGENCY MEDICINE

## 2021-12-18 PROCEDURE — 25000003 PHARM REV CODE 250: Performed by: STUDENT IN AN ORGANIZED HEALTH CARE EDUCATION/TRAINING PROGRAM

## 2021-12-18 PROCEDURE — 99222 PR INITIAL HOSPITAL CARE,LEVL II: ICD-10-PCS | Mod: AI,,, | Performed by: STUDENT IN AN ORGANIZED HEALTH CARE EDUCATION/TRAINING PROGRAM

## 2021-12-18 PROCEDURE — 83690 ASSAY OF LIPASE: CPT | Performed by: PHYSICIAN ASSISTANT

## 2021-12-18 PROCEDURE — 83605 ASSAY OF LACTIC ACID: CPT | Performed by: PHYSICIAN ASSISTANT

## 2021-12-18 PROCEDURE — 93005 ELECTROCARDIOGRAM TRACING: CPT

## 2021-12-18 PROCEDURE — 93010 EKG 12-LEAD: ICD-10-PCS | Mod: ,,, | Performed by: INTERNAL MEDICINE

## 2021-12-18 PROCEDURE — 63600175 PHARM REV CODE 636 W HCPCS: Performed by: PHYSICIAN ASSISTANT

## 2021-12-18 PROCEDURE — 11000001 HC ACUTE MED/SURG PRIVATE ROOM

## 2021-12-18 PROCEDURE — 87502 INFLUENZA DNA AMP PROBE: CPT | Performed by: PHYSICIAN ASSISTANT

## 2021-12-18 PROCEDURE — 87040 BLOOD CULTURE FOR BACTERIA: CPT | Performed by: PHYSICIAN ASSISTANT

## 2021-12-18 PROCEDURE — 99222 1ST HOSP IP/OBS MODERATE 55: CPT | Mod: AI,,, | Performed by: STUDENT IN AN ORGANIZED HEALTH CARE EDUCATION/TRAINING PROGRAM

## 2021-12-18 PROCEDURE — 80053 COMPREHEN METABOLIC PANEL: CPT | Performed by: PHYSICIAN ASSISTANT

## 2021-12-18 PROCEDURE — 84145 PROCALCITONIN (PCT): CPT | Performed by: PHYSICIAN ASSISTANT

## 2021-12-18 PROCEDURE — 99285 EMERGENCY DEPT VISIT HI MDM: CPT | Mod: CS,,, | Performed by: EMERGENCY MEDICINE

## 2021-12-18 PROCEDURE — 96365 THER/PROPH/DIAG IV INF INIT: CPT

## 2021-12-18 PROCEDURE — 25000003 PHARM REV CODE 250: Performed by: PHYSICIAN ASSISTANT

## 2021-12-18 PROCEDURE — 83880 ASSAY OF NATRIURETIC PEPTIDE: CPT | Performed by: PHYSICIAN ASSISTANT

## 2021-12-18 RX ORDER — AZITHROMYCIN 250 MG/1
250 TABLET, FILM COATED ORAL DAILY
Status: DISCONTINUED | OUTPATIENT
Start: 2021-12-19 | End: 2021-12-19 | Stop reason: HOSPADM

## 2021-12-18 RX ORDER — ENOXAPARIN SODIUM 100 MG/ML
40 INJECTION SUBCUTANEOUS EVERY 24 HOURS
Status: DISCONTINUED | OUTPATIENT
Start: 2021-12-19 | End: 2021-12-19 | Stop reason: HOSPADM

## 2021-12-18 RX ORDER — INSULIN ASPART 100 [IU]/ML
0-5 INJECTION, SOLUTION INTRAVENOUS; SUBCUTANEOUS
Status: DISCONTINUED | OUTPATIENT
Start: 2021-12-18 | End: 2021-12-19 | Stop reason: HOSPADM

## 2021-12-18 RX ORDER — SODIUM CHLORIDE 0.9 % (FLUSH) 0.9 %
10 SYRINGE (ML) INJECTION
Status: DISCONTINUED | OUTPATIENT
Start: 2021-12-18 | End: 2021-12-19 | Stop reason: HOSPADM

## 2021-12-18 RX ORDER — QUETIAPINE FUMARATE 25 MG/1
25 TABLET, FILM COATED ORAL NIGHTLY
Status: DISCONTINUED | OUTPATIENT
Start: 2021-12-18 | End: 2021-12-19 | Stop reason: HOSPADM

## 2021-12-18 RX ORDER — SODIUM CHLORIDE 0.9 % (FLUSH) 0.9 %
10 SYRINGE (ML) INJECTION EVERY 12 HOURS PRN
Status: DISCONTINUED | OUTPATIENT
Start: 2021-12-18 | End: 2021-12-19 | Stop reason: HOSPADM

## 2021-12-18 RX ORDER — SENNOSIDES 8.6 MG/1
1 TABLET ORAL DAILY PRN
Status: DISCONTINUED | OUTPATIENT
Start: 2021-12-18 | End: 2021-12-19 | Stop reason: HOSPADM

## 2021-12-18 RX ORDER — IBUPROFEN 200 MG
16 TABLET ORAL
Status: DISCONTINUED | OUTPATIENT
Start: 2021-12-18 | End: 2021-12-19 | Stop reason: HOSPADM

## 2021-12-18 RX ORDER — NALOXONE HCL 0.4 MG/ML
0.02 VIAL (ML) INJECTION
Status: DISCONTINUED | OUTPATIENT
Start: 2021-12-18 | End: 2021-12-19 | Stop reason: HOSPADM

## 2021-12-18 RX ORDER — ACETAMINOPHEN 325 MG/1
650 TABLET ORAL EVERY 6 HOURS PRN
Status: DISCONTINUED | OUTPATIENT
Start: 2021-12-18 | End: 2021-12-19 | Stop reason: HOSPADM

## 2021-12-18 RX ORDER — DOCUSATE SODIUM 100 MG/1
100 CAPSULE, LIQUID FILLED ORAL DAILY
Status: DISCONTINUED | OUTPATIENT
Start: 2021-12-19 | End: 2021-12-19 | Stop reason: HOSPADM

## 2021-12-18 RX ORDER — EPINEPHRINE 0.22MG
100 AEROSOL WITH ADAPTER (ML) INHALATION DAILY
Status: DISCONTINUED | OUTPATIENT
Start: 2021-12-19 | End: 2021-12-19 | Stop reason: HOSPADM

## 2021-12-18 RX ORDER — LISINOPRIL 10 MG/1
10 TABLET ORAL 2 TIMES DAILY
Status: DISCONTINUED | OUTPATIENT
Start: 2021-12-18 | End: 2021-12-19 | Stop reason: HOSPADM

## 2021-12-18 RX ORDER — ACETAMINOPHEN 500 MG
5000 TABLET ORAL DAILY
Status: DISCONTINUED | OUTPATIENT
Start: 2021-12-19 | End: 2021-12-19 | Stop reason: HOSPADM

## 2021-12-18 RX ORDER — IBUPROFEN 200 MG
24 TABLET ORAL
Status: DISCONTINUED | OUTPATIENT
Start: 2021-12-18 | End: 2021-12-19 | Stop reason: HOSPADM

## 2021-12-18 RX ORDER — GLUCAGON 1 MG
1 KIT INJECTION
Status: DISCONTINUED | OUTPATIENT
Start: 2021-12-18 | End: 2021-12-19 | Stop reason: HOSPADM

## 2021-12-18 RX ADMIN — AZITHROMYCIN MONOHYDRATE 500 MG: 500 INJECTION, POWDER, LYOPHILIZED, FOR SOLUTION INTRAVENOUS at 03:12

## 2021-12-18 RX ADMIN — CEFTRIAXONE 2 G: 2 INJECTION, SOLUTION INTRAVENOUS at 01:12

## 2021-12-18 RX ADMIN — LISINOPRIL 10 MG: 10 TABLET ORAL at 09:12

## 2021-12-18 RX ADMIN — SODIUM CHLORIDE 250 ML: 0.9 INJECTION, SOLUTION INTRAVENOUS at 01:12

## 2021-12-18 NOTE — ASSESSMENT & PLAN NOTE
Patient was in her usual state of health until last night when she started to develop a dry cough, along with mild shortness of breath, and was febrile (up to 102). CXR with interstitial opacities.   - does not appear volume overloaded. Hold lasix for now, pt reportedly takes prn at home  - continue rocephin and azithromycin for empiric cap treatment

## 2021-12-18 NOTE — SUBJECTIVE & OBJECTIVE
Past Medical History:   Diagnosis Date    Diabetes mellitus, type 2     Hypertension     Syncope and collapse        Past Surgical History:   Procedure Laterality Date    CARDIAC PACEMAKER PLACEMENT      CATARACT EXTRACTION      COLONOSCOPY N/A 6/18/2020    Procedure: COLONOSCOPY;  Surgeon: Toro Burgos MD;  Location: [facility] ([address] [place] [address]);  Service: Endoscopy;  Laterality: N/A;    ESOPHAGOGASTRODUODENOSCOPY N/A 6/18/2020    Procedure: EGD (ESOPHAGOGASTRODUODENOSCOPY);  Surgeon: Toro Burgos MD;  Location: [facility] ([address] [place] [address]);  Service: Endoscopy;  Laterality: N/A;    HYSTERECTOMY         Review of patient's allergies indicates:  No Known Allergies    No current facility-administered medications on file prior to encounter.     Current Outpatient Medications on File Prior to Encounter   Medication Sig    alpha lipoic acid 300 mg Cap Take by mouth.    blood sugar diagnostic (ACCU-CHEK GERMÁN PLUS TEST STRP) Strp USE ONE STRIP TO TEST DAILY.    blood sugar diagnostic (ACCU-CHEK GERMÁN PLUS TEST STRP) Strp USE ONE STRIP TO TEST DAILY.    cholecalciferol, vitamin D3, 125 mcg (5,000 unit) Tab Take 5,000 Units by mouth once daily.     co-enzyme Q-10 30 mg capsule Take 100 mg by mouth once daily.     cranberry 400 mg Cap Take by mouth.    diclofenac sodium (VOLTAREN) 1 % Gel Apply 2 g topically once daily.    docusate sodium (COLACE ORAL) Take by mouth once daily.    ferrous sulfate (FEOSOL) 325 mg (65 mg iron) Tab tablet Take 1 tablet (325 mg total) by mouth daily with breakfast.    furosemide (LASIX) 20 MG tablet Take 1 tablet (20 mg total) by mouth once daily.    lancets (ONETOUCH DELICA LANCETS) 33 gauge Misc Check fasting glucose daily    lancets Misc Check glucose daily    LIDOcaine (LIDODERM) 5 % Place 1 patch onto the skin once daily. Remove & Discard patch within 12 hours or as directed by MD    lisinopriL 10 MG tablet Take 1 tablet (10 mg total) by mouth 2 (two) times daily.     pantoprazole (PROTONIX) 40 MG tablet Take 1 tablet (40 mg total) by mouth once daily.    QUEtiapine (SEROQUEL) 25 MG Tab Take 1 tablet (25 mg total) by mouth 2 (two) times daily as needed (agitation). Take 1/2 tablet po nightly prn insomnia    senna (SENOKOT) 8.6 mg tablet Take 1 tablet by mouth daily as needed for Constipation.    TURMERIC ORAL Take by mouth.    vit A/vit C/vit E/zinc/copper (OCUVITE PRESERVISION ORAL) Take by mouth.     Family History     Family history is unknown by patient.        Tobacco Use    Smoking status: Former Smoker     Years: 25.00     Quit date: 1974     Years since quittin.5    Smokeless tobacco: Never Used   Substance and Sexual Activity    Alcohol use: No     Alcohol/week: 0.0 standard drinks    Drug use: No    Sexual activity: Not Currently     Review of Systems   Constitutional: Positive for chills and fever. Negative for activity change and diaphoresis.   HENT: Negative for congestion and sore throat.    Respiratory: Positive for cough and shortness of breath. Negative for chest tightness.    Cardiovascular: Negative for chest pain, palpitations and leg swelling.   Gastrointestinal: Positive for nausea. Negative for abdominal pain, constipation, diarrhea and vomiting.   Genitourinary: Negative for dysuria, frequency and urgency.   Musculoskeletal: Negative for arthralgias and myalgias.   Neurological: Negative for dizziness, syncope, light-headedness and headaches.   Psychiatric/Behavioral: Negative for agitation and confusion.     Objective:     Vital Signs (Most Recent):  Temp: 98.6 °F (37 °C) (21 1329)  Pulse: 62 (21 1703)  Resp: 16 (21 1329)  BP: (!) 142/65 (21 1703)  SpO2: 98 % (21 1703) Vital Signs (24h Range):  Temp:  [98.6 °F (37 °C)-99.6 °F (37.6 °C)] 98.6 °F (37 °C)  Pulse:  [60-74] 62  Resp:  [16] 16  SpO2:  [96 %-99 %] 98 %  BP: (124-155)/(60-74) 142/65     Weight: 66.2 kg (146 lb)  Body mass index is 28.51  kg/m².    Physical Exam  Constitutional:       General: She is not in acute distress.     Appearance: Normal appearance. She is not ill-appearing, toxic-appearing or diaphoretic.   HENT:      Head: Normocephalic and atraumatic.      Mouth/Throat:      Mouth: Mucous membranes are moist.   Eyes:      Extraocular Movements: Extraocular movements intact.   Cardiovascular:      Rate and Rhythm: Normal rate and regular rhythm.      Heart sounds: No murmur heard.  No friction rub. No gallop.    Pulmonary:      Effort: Pulmonary effort is normal. No respiratory distress.      Breath sounds: Normal breath sounds. No wheezing.   Abdominal:      General: Abdomen is flat. There is no distension.      Palpations: Abdomen is soft.      Tenderness: There is no abdominal tenderness. There is no guarding or rebound.   Musculoskeletal:      Right lower leg: No edema.      Left lower leg: No edema.   Skin:     General: Skin is warm and dry.      Findings: No lesion or rash.   Neurological:      Mental Status: She is alert. Mental status is at baseline.             Significant Labs: All pertinent labs within the past 24 hours have been reviewed.    Significant Imaging: I have reviewed all pertinent imaging results/findings within the past 24 hours.

## 2021-12-18 NOTE — ASSESSMENT & PLAN NOTE
Pt takes seroquel 25 mg qhs. Does not typically use additional for agitation but has available  Confirmed with pt and son DNR

## 2021-12-18 NOTE — HPI
Luiza Childers is a 88 y.o. with past medical history of HTN, CKD, dementia, who presented with cough. History obtained with assistance of patient's son at bedside. Patient was in her usual state of health until last night when she started to develop a dry cough, along with mild shortness of breath, and was febrile (up to 102 last night). She denies chest pain, abdominal pain, urinary symptoms, or changes in bowel habits. Notes that this morning she had some nausea but no vomiting. She was brought into the ED for evaluation.

## 2021-12-18 NOTE — H&P
[facility] - Emergency Dept  [facility]  History & Physical    Patient Name: Luiza Childers  MRN: [id number]  Patient Class: IP- Inpatient  Admission Date: 12/18/2021  Attending Physician: Denis Segura*   Primary Care Provider: Isis Richter DO         Patient information was obtained from patient, relative(s), past medical records and ER records.     Subjective:     Principal Problem:Pneumonia due to infectious organism    Chief Complaint:   Chief Complaint   Patient presents with    Fever     Hx of dementia. Worsened confusion. Denies urinary symptoms, but son concerned about UTI.         HPI: Luiza Childers is a 88 y.o. with past medical history of HTN, CKD, dementia, who presented with cough. History obtained with assistance of patient's son at bedside. Patient was in her usual state of health until last night when she started to develop a dry cough, along with mild shortness of breath, and was febrile (up to 102 last night). She denies chest pain, abdominal pain, urinary symptoms, or changes in bowel habits. Notes that this morning she had some nausea but no vomiting. She was brought into the ED for evaluation. Patient was hemodynamically stable and saturating well on room air. Labs with leukocytosis (13.6), elevated procal, UA negative, blood cultures drawn, flu and covid negative. CXR with interstitial opacities suspicious for mild edema or interstitial pneumonia. Patient was given rocephin and azithromycin and admitted to hospital medicine.      Past Medical History:   Diagnosis Date    Diabetes mellitus, type 2     Hypertension     Syncope and collapse        Past Surgical History:   Procedure Laterality Date    CARDIAC PACEMAKER PLACEMENT      CATARACT EXTRACTION      COLONOSCOPY N/A 6/18/2020    Procedure: COLONOSCOPY;  Surgeon: Toro Burgos MD;  Location: [address];  Service: Endoscopy;  Laterality: N/A;    ESOPHAGOGASTRODUODENOSCOPY N/A 6/18/2020     Procedure: EGD (ESOPHAGOGASTRODUODENOSCOPY);  Surgeon: Toro Burgos MD;  Location: [facility] ([address] [place] [address]);  Service: Endoscopy;  Laterality: N/A;    HYSTERECTOMY         Review of patient's allergies indicates:  No Known Allergies    No current facility-administered medications on file prior to encounter.     Current Outpatient Medications on File Prior to Encounter   Medication Sig    alpha lipoic acid 300 mg Cap Take by mouth.    blood sugar diagnostic (ACCU-CHEK GERMÁN PLUS TEST STRP) Strp USE ONE STRIP TO TEST DAILY.    blood sugar diagnostic (ACCU-CHEK GERMÁN PLUS TEST STRP) Strp USE ONE STRIP TO TEST DAILY.    cholecalciferol, vitamin D3, 125 mcg (5,000 unit) Tab Take 5,000 Units by mouth once daily.     co-enzyme Q-10 30 mg capsule Take 100 mg by mouth once daily.     cranberry 400 mg Cap Take by mouth.    diclofenac sodium (VOLTAREN) 1 % Gel Apply 2 g topically once daily.    docusate sodium (COLACE ORAL) Take by mouth once daily.    ferrous sulfate (FEOSOL) 325 mg (65 mg iron) Tab tablet Take 1 tablet (325 mg total) by mouth daily with breakfast.    furosemide (LASIX) 20 MG tablet Take 1 tablet (20 mg total) by mouth once daily.    lancets (ONETOUCH DELICA LANCETS) 33 gauge Misc Check fasting glucose daily    lancets Misc Check glucose daily    LIDOcaine (LIDODERM) 5 % Place 1 patch onto the skin once daily. Remove & Discard patch within 12 hours or as directed by MD    lisinopriL 10 MG tablet Take 1 tablet (10 mg total) by mouth 2 (two) times daily.    pantoprazole (PROTONIX) 40 MG tablet Take 1 tablet (40 mg total) by mouth once daily.    QUEtiapine (SEROQUEL) 25 MG Tab Take 1 tablet (25 mg total) by mouth 2 (two) times daily as needed (agitation). Take 1/2 tablet po nightly prn insomnia    senna (SENOKOT) 8.6 mg tablet Take 1 tablet by mouth daily as needed for Constipation.    TURMERIC ORAL Take by mouth.    vit A/vit C/vit E/zinc/copper (OCUVITE PRESERVISION ORAL) Take by mouth.      Family History     Family history is unknown by patient.        Tobacco Use    Smoking status: Former Smoker     Years: 25.00     Quit date: 1974     Years since quittin.5    Smokeless tobacco: Never Used   Substance and Sexual Activity    Alcohol use: No     Alcohol/week: 0.0 standard drinks    Drug use: No    Sexual activity: Not Currently     Review of Systems   Constitutional: Positive for chills and fever. Negative for activity change and diaphoresis.   HENT: Negative for congestion and sore throat.    Respiratory: Positive for cough and shortness of breath. Negative for chest tightness.    Cardiovascular: Negative for chest pain, palpitations and leg swelling.   Gastrointestinal: Positive for nausea. Negative for abdominal pain, constipation, diarrhea and vomiting.   Genitourinary: Negative for dysuria, frequency and urgency.   Musculoskeletal: Negative for arthralgias and myalgias.   Neurological: Negative for dizziness, syncope, light-headedness and headaches.   Psychiatric/Behavioral: Negative for agitation and confusion.     Objective:     Vital Signs (Most Recent):  Temp: 98.6 °F (37 °C) (21 1329)  Pulse: 62 (21 1703)  Resp: 16 (21 1329)  BP: (!) 142/65 (21 1703)  SpO2: 98 % (21 1703) Vital Signs (24h Range):  Temp:  [98.6 °F (37 °C)-99.6 °F (37.6 °C)] 98.6 °F (37 °C)  Pulse:  [60-74] 62  Resp:  [16] 16  SpO2:  [96 %-99 %] 98 %  BP: (124-155)/(60-74) 142/65     Weight: 66.2 kg (146 lb)  Body mass index is 28.51 kg/m².    Physical Exam  Constitutional:       General: She is not in acute distress.     Appearance: Normal appearance. She is not ill-appearing, toxic-appearing or diaphoretic.   HENT:      Head: Normocephalic and atraumatic.      Mouth/Throat:      Mouth: Mucous membranes are moist.   Eyes:      Extraocular Movements: Extraocular movements intact.   Cardiovascular:      Rate and Rhythm: Normal rate and regular rhythm.      Heart sounds: No murmur  heard.  No friction rub. No gallop.    Pulmonary:      Effort: Pulmonary effort is normal. No respiratory distress.      Breath sounds: Normal breath sounds. No wheezing.   Abdominal:      General: Abdomen is flat. There is no distension.      Palpations: Abdomen is soft.      Tenderness: There is no abdominal tenderness. There is no guarding or rebound.   Musculoskeletal:      Right lower leg: No edema.      Left lower leg: No edema.   Skin:     General: Skin is warm and dry.      Findings: No lesion or rash.   Neurological:      Mental Status: She is alert. Mental status is at baseline.             Significant Labs: All pertinent labs within the past 24 hours have been reviewed.    Significant Imaging: I have reviewed all pertinent imaging results/findings within the past 24 hours.    Assessment/Plan:     * Pneumonia due to infectious organism  Patient was in her usual state of health until last night when she started to develop a dry cough, along with mild shortness of breath, and was febrile (up to 102). CXR with interstitial opacities.   - does not appear volume overloaded. Hold lasix for now, pt reportedly takes prn at home  - continue rocephin and azithromycin for empiric cap treatment    Dementia with behavioral disturbance  Pt takes seroquel 25 mg qhs. Does not typically use additional for agitation but has available  Confirmed with pt and son DNR      Hypertension associated with diabetes  Continue lisinopril      Diabetes mellitus, type 2  Diet controlled  LDSSI      VTE Risk Mitigation (From admission, onward)         Ordered     enoxaparin injection 40 mg  Daily         12/18/21 1732     IP VTE HIGH RISK PATIENT  Once         12/18/21 1732     Place sequential compression device  Until discontinued         12/18/21 1732                   Denis Ballesteros MD  Department of Hospital Medicine   [facility] - Emergency Dept

## 2021-12-18 NOTE — ED TRIAGE NOTES
The patient is a 88 year old female who presents to the ED with her son for evaluation of a fever. The son explains that he is the patient's primary caregiver and this AM he took the pt temp and it was 102. While in the ED temp is wnl. Pt denies all other complaints.

## 2021-12-18 NOTE — ED PROVIDER NOTES
Encounter Date: 2021       History     Chief Complaint   Patient presents with    Fever     Hx of dementia. Worsened confusion. Denies urinary symptoms, but son concerned about UTI.      The patient is an 87 year old female who has a documented past medical history of CKD III, DM II, HTN, dementia, pacemaker, hip fracture, and anemia. She lives with her son who is a physical therapist. He brought her to the ER this morning and is providing the history. He states that he noticed that she was coughing last night. He states that this morning she complained of nausea. He states that he checked her vital signs and her temperature was 102. He states that her HR and blood pressure were in normal range. He states that she seems to be at her baseline cognitively, but that she did see dirt on the kitchen floor and commented that she saw bugs. She has only had a single dose of Covid vaccine. She received her flu shot about 1 week ago. No additional symptoms or concerns reported.         Review of patient's allergies indicates:  No Known Allergies  Past Medical History:   Diagnosis Date    Diabetes mellitus, type 2     Hypertension     Syncope and collapse      Past Surgical History:   Procedure Laterality Date    CARDIAC PACEMAKER PLACEMENT      CATARACT EXTRACTION      COLONOSCOPY N/A 2020    Procedure: COLONOSCOPY;  Surgeon: Toro Burgos MD;  Location: [address]);  Service: Endoscopy;  Laterality: N/A;    ESOPHAGOGASTRODUODENOSCOPY N/A 2020    Procedure: EGD (ESOPHAGOGASTRODUODENOSCOPY);  Surgeon: Toro Burgos MD;  Location: [address]);  Service: Endoscopy;  Laterality: N/A;    HYSTERECTOMY       Family History   Family history unknown: Yes     Social History     Tobacco Use    Smoking status: Former Smoker     Years: 25.00     Quit date: 1974     Years since quittin.5    Smokeless tobacco: Never Used   Substance Use Topics    Alcohol use: No     Alcohol/week:  0.0 standard drinks    Drug use: No     Review of Systems   Constitutional: Positive for appetite change and fever.   HENT: Negative for congestion, rhinorrhea and sore throat.    Respiratory: Positive for cough. Negative for shortness of breath.    Cardiovascular: Negative for chest pain, palpitations and leg swelling.   Gastrointestinal: Positive for nausea. Negative for abdominal distention, abdominal pain, diarrhea and vomiting.   Genitourinary: Negative for difficulty urinating and dysuria.   Musculoskeletal: Negative for back pain, gait problem, myalgias and neck pain.   Skin: Negative for rash and wound.   Allergic/Immunologic: Negative for immunocompromised state.   Neurological: Negative for dizziness, syncope, facial asymmetry, speech difficulty, light-headedness, numbness and headaches.   Hematological: Negative for adenopathy.   Psychiatric/Behavioral: Positive for confusion.       Physical Exam     Initial Vitals [12/18/21 1010]   BP Pulse Resp Temp SpO2   (!) 155/74 74 16 99.6 °F (37.6 °C) 96 %      MAP       --         Physical Exam    Nursing note and vitals reviewed.  Constitutional: She appears well-developed and well-nourished. She is not diaphoretic.   She is alert and ambulatory. She is accompanied by her son.    HENT:   Head: Normocephalic and atraumatic.   Eyes: Conjunctivae are normal.   Neck: Neck supple. JVD present.   Cardiovascular: Normal rate.   Pulmonary/Chest: No respiratory distress. She has no wheezes.   Infrequent cough. No tachypnea or increased work of breathing. SaO2 99% on room air.    Abdominal: Abdomen is soft. She exhibits no distension. There is no abdominal tenderness. There is no rebound and no guarding.   Benign abdomen. No peritoneal signs.    Musculoskeletal:         General: Normal range of motion.      Cervical back: Neck supple.      Comments: Compression stockings in place. No calf pain. Trace pretibial edema.      Neurological: She is alert.   Normal speech.  Normal gait. No facial droop. Moves extremities x 4. No focal deficit.    Skin: Skin is warm and dry.   Psychiatric: She has a normal mood and affect.         ED Course   Procedures  Labs Reviewed   CBC W/ AUTO DIFFERENTIAL - Abnormal; Notable for the following components:       Result Value    WBC 13.61 (*)     RBC 3.49 (*)     Hemoglobin 8.9 (*)     Hematocrit 28.7 (*)     MCH 25.5 (*)     MCHC 31.0 (*)     RDW 16.4 (*)     Gran # (ANC) 12.5 (*)     Lymph # 0.6 (*)     Gran % 91.8 (*)     Lymph % 4.2 (*)     Mono % 3.5 (*)     All other components within normal limits   COMPREHENSIVE METABOLIC PANEL - Abnormal; Notable for the following components:    Sodium 134 (*)     Glucose 209 (*)     BUN 31 (*)     Alkaline Phosphatase 52 (*)     eGFR if  51.8 (*)     eGFR if non  44.9 (*)     All other components within normal limits   PROCALCITONIN - Abnormal; Notable for the following components:    Procalcitonin 0.26 (*)     All other components within normal limits   B-TYPE NATRIURETIC PEPTIDE - Abnormal; Notable for the following components:     (*)     All other components within normal limits    Narrative:     Add on BNP pre ALLAN SERRANO PA-C  12/18/2021  13:30 [id number]   INFLUENZA A & B BY MOLECULAR   CULTURE, BLOOD   CULTURE, BLOOD   LACTIC ACID, PLASMA   LIPASE   TROPONIN I   B-TYPE NATRIURETIC PEPTIDE   TROPONIN I   URINALYSIS, REFLEX TO URINE CULTURE   SARS-COV-2 RDRP GENE     Results for orders placed or performed during the hospital encounter of 12/18/21   Influenza A & B by Molecular    Specimen: Nasopharyngeal Swab   Result Value Ref Range    Influenza A, Molecular Negative Negative    Influenza B, Molecular Negative Negative    Flu A & B Source Nasal swab    CBC auto differential   Result Value Ref Range    WBC 13.61 (H) 3.90 - 12.70 K/uL    RBC 3.49 (L) 4.00 - 5.40 M/uL    Hemoglobin 8.9 (L) 12.0 - 16.0 g/dL    Hematocrit 28.7 (L) 37.0 - 48.5 %    MCV 82 82 -  98 fL    MCH 25.5 (L) 27.0 - 31.0 pg    MCHC 31.0 (L) 32.0 - 36.0 g/dL    RDW 16.4 (H) 11.5 - 14.5 %    Platelets 187 150 - 450 K/uL    MPV 11.2 9.2 - 12.9 fL    Immature Granulocytes 0.3 0.0 - 0.5 %    Gran # (ANC) 12.5 (H) 1.8 - 7.7 K/uL    Immature Grans (Abs) 0.04 0.00 - 0.04 K/uL    Lymph # 0.6 (L) 1.0 - 4.8 K/uL    Mono # 0.5 0.3 - 1.0 K/uL    Eos # 0.0 0.0 - 0.5 K/uL    Baso # 0.01 0.00 - 0.20 K/uL    nRBC 0 0 /100 WBC    Gran % 91.8 (H) 38.0 - 73.0 %    Lymph % 4.2 (L) 18.0 - 48.0 %    Mono % 3.5 (L) 4.0 - 15.0 %    Eosinophil % 0.1 0.0 - 8.0 %    Basophil % 0.1 0.0 - 1.9 %    Differential Method Automated    Comprehensive metabolic panel   Result Value Ref Range    Sodium 134 (L) 136 - 145 mmol/L    Potassium 4.3 3.5 - 5.1 mmol/L    Chloride 101 95 - 110 mmol/L    CO2 23 23 - 29 mmol/L    Glucose 209 (H) 70 - 110 mg/dL    BUN 31 (H) 8 - 23 mg/dL    Creatinine 1.1 0.5 - 1.4 mg/dL    Calcium 9.5 8.7 - 10.5 mg/dL    Total Protein 6.7 6.0 - 8.4 g/dL    Albumin 3.6 3.5 - 5.2 g/dL    Total Bilirubin 0.5 0.1 - 1.0 mg/dL    Alkaline Phosphatase 52 (L) 55 - 135 U/L    AST 27 10 - 40 U/L    ALT 24 10 - 44 U/L    Anion Gap 10 8 - 16 mmol/L    eGFR if African American 51.8 (A) >60 mL/min/1.73 m^2    eGFR if non African American 44.9 (A) >60 mL/min/1.73 m^2   Lactic acid, plasma   Result Value Ref Range    Lactate (Lactic Acid) 1.6 0.5 - 2.2 mmol/L   Procalcitonin   Result Value Ref Range    Procalcitonin 0.26 (H) <0.25 ng/mL   Lipase   Result Value Ref Range    Lipase 27 4 - 60 U/L   Troponin I   Result Value Ref Range    Troponin I 0.019 0.000 - 0.026 ng/mL   BNP   Result Value Ref Range     (H) 0 - 99 pg/mL   POCT COVID-19 Rapid Screening   Result Value Ref Range    POC Rapid COVID Negative Negative     Acceptable Yes             Imaging Results          X-Ray Chest AP Portable (Final result)  Result time 12/18/21 12:40:15    Final result by Jabier Irvin DO (12/18/21 12:40:15)                  Impression:      Interstitial opacities suspicious for mild edema or interstitial pneumonia.      Electronically signed by: Jabier Irvin  Date:    12/18/2021  Time:    12:40             Narrative:    EXAMINATION:  XR CHEST AP PORTABLE    CLINICAL HISTORY:  Fever, unspecified    TECHNIQUE:  Single frontal view of the chest was performed.    COMPARISON:  03/17/2016.    FINDINGS:  There is a cardiac pacer, unchanged in position from prior.    The lungs are well expanded.  There are interstitial opacities.  There is no large focal consolidation.  The pleural spaces are clear.    The cardiac silhouette is enlarged.    Visualized osseous structures demonstrate degenerative changes.                                 Medications   sodium chloride 0.9% bolus 250 mL (250 mLs Intravenous New Bag 12/18/21 1357)   cefTRIAXone (ROCEPHIN) 2 g/50 mL D5W IVPB (2 g Intravenous New Bag 12/18/21 1355)   azithromycin 500 mg in dextrose 5 % 250 mL IVPB (ready to mix system) (0 mg Intravenous Hold 12/18/21 1330)     Medical Decision Making:   History:   I obtained history from: someone other than patient.  Old Medical Records: I decided to obtain old medical records.  Initial Assessment:   89 yo female, brought to the ER by her son for an emergent evaluation due to acute cough, fever of 102 at home, nausea, decreased appetite, and mild confusion since last night.   Differential Diagnosis:   Covid, UTI, pneumonia, infection, electrolyte derangement, CHF, etc    Clinical Tests:   Lab Tests: Ordered and Reviewed  Radiological Study: Ordered and Reviewed  Medical Tests: Ordered and Reviewed  ED Management:  Vital signs reviewed   Records reviewed   I discussed the case in detail with the ER attending physician   Covid and Flu negative   Chest xray abnormal - pneumonia suspected - fever, cough, leukocytosis, elevated procal     IV antibiotics initiated in the ER    specifies inpatient criteria is met   Hospital medicine will  admit                           Clinical Impression:   Final diagnoses:  [R50.9] Fever  [J18.9] Pneumonia due to infectious organism, unspecified laterality, unspecified part of lung (Primary)  [R79.89] Elevated procalcitonin  [D72.829] Leukocytosis, unspecified type  [R79.89] Elevated brain natriuretic peptide (BNP) level          ED Disposition Condition    Admit               Bryan Bush PA-C  12/18/21 1413       Bryan Bush PA-C  12/18/21 1417

## 2021-12-19 VITALS
TEMPERATURE: 97 F | WEIGHT: 138 LBS | HEART RATE: 65 BPM | RESPIRATION RATE: 18 BRPM | SYSTOLIC BLOOD PRESSURE: 169 MMHG | HEIGHT: 60 IN | DIASTOLIC BLOOD PRESSURE: 74 MMHG | OXYGEN SATURATION: 96 % | BODY MASS INDEX: 27.09 KG/M2

## 2021-12-19 LAB
ANION GAP SERPL CALC-SCNC: 9 MMOL/L (ref 8–16)
BASOPHILS # BLD AUTO: 0.01 K/UL (ref 0–0.2)
BASOPHILS # BLD AUTO: 0.02 K/UL (ref 0–0.2)
BASOPHILS NFR BLD: 0.2 % (ref 0–1.9)
BASOPHILS NFR BLD: 0.3 % (ref 0–1.9)
BUN SERPL-MCNC: 29 MG/DL (ref 8–23)
CALCIUM SERPL-MCNC: 9.1 MG/DL (ref 8.7–10.5)
CHLORIDE SERPL-SCNC: 104 MMOL/L (ref 95–110)
CO2 SERPL-SCNC: 22 MMOL/L (ref 23–29)
CREAT SERPL-MCNC: 0.9 MG/DL (ref 0.5–1.4)
DIFFERENTIAL METHOD: ABNORMAL
DIFFERENTIAL METHOD: ABNORMAL
EOSINOPHIL # BLD AUTO: 0.2 K/UL (ref 0–0.5)
EOSINOPHIL # BLD AUTO: 0.2 K/UL (ref 0–0.5)
EOSINOPHIL NFR BLD: 3 % (ref 0–8)
EOSINOPHIL NFR BLD: 3.9 % (ref 0–8)
ERYTHROCYTE [DISTWIDTH] IN BLOOD BY AUTOMATED COUNT: 16.5 % (ref 11.5–14.5)
ERYTHROCYTE [DISTWIDTH] IN BLOOD BY AUTOMATED COUNT: 16.6 % (ref 11.5–14.5)
EST. GFR  (AFRICAN AMERICAN): >60 ML/MIN/1.73 M^2
EST. GFR  (NON AFRICAN AMERICAN): 57.3 ML/MIN/1.73 M^2
GLUCOSE SERPL-MCNC: 122 MG/DL (ref 70–110)
HCT VFR BLD AUTO: 23.7 % (ref 37–48.5)
HCT VFR BLD AUTO: 25.3 % (ref 37–48.5)
HGB BLD-MCNC: 7.2 G/DL (ref 12–16)
HGB BLD-MCNC: 7.7 G/DL (ref 12–16)
IMM GRANULOCYTES # BLD AUTO: 0.01 K/UL (ref 0–0.04)
IMM GRANULOCYTES # BLD AUTO: 0.02 K/UL (ref 0–0.04)
IMM GRANULOCYTES NFR BLD AUTO: 0.2 % (ref 0–0.5)
IMM GRANULOCYTES NFR BLD AUTO: 0.3 % (ref 0–0.5)
LYMPHOCYTES # BLD AUTO: 0.8 K/UL (ref 1–4.8)
LYMPHOCYTES # BLD AUTO: 1.1 K/UL (ref 1–4.8)
LYMPHOCYTES NFR BLD: 14.7 % (ref 18–48)
LYMPHOCYTES NFR BLD: 15.2 % (ref 18–48)
MCH RBC QN AUTO: 25.6 PG (ref 27–31)
MCH RBC QN AUTO: 25.7 PG (ref 27–31)
MCHC RBC AUTO-ENTMCNC: 30.4 G/DL (ref 32–36)
MCHC RBC AUTO-ENTMCNC: 30.4 G/DL (ref 32–36)
MCV RBC AUTO: 84 FL (ref 82–98)
MCV RBC AUTO: 84 FL (ref 82–98)
MONOCYTES # BLD AUTO: 0.3 K/UL (ref 0.3–1)
MONOCYTES # BLD AUTO: 0.4 K/UL (ref 0.3–1)
MONOCYTES NFR BLD: 5.4 % (ref 4–15)
MONOCYTES NFR BLD: 6.3 % (ref 4–15)
NEUTROPHILS # BLD AUTO: 4 K/UL (ref 1.8–7.7)
NEUTROPHILS # BLD AUTO: 5.6 K/UL (ref 1.8–7.7)
NEUTROPHILS NFR BLD: 74.7 % (ref 38–73)
NEUTROPHILS NFR BLD: 75.8 % (ref 38–73)
NRBC BLD-RTO: 0 /100 WBC
NRBC BLD-RTO: 0 /100 WBC
PLATELET # BLD AUTO: 149 K/UL (ref 150–450)
PLATELET # BLD AUTO: 154 K/UL (ref 150–450)
PMV BLD AUTO: 11.3 FL (ref 9.2–12.9)
PMV BLD AUTO: 12.7 FL (ref 9.2–12.9)
POCT GLUCOSE: 140 MG/DL (ref 70–110)
POCT GLUCOSE: 176 MG/DL (ref 70–110)
POTASSIUM SERPL-SCNC: 4 MMOL/L (ref 3.5–5.1)
RBC # BLD AUTO: 2.81 M/UL (ref 4–5.4)
RBC # BLD AUTO: 3 M/UL (ref 4–5.4)
SODIUM SERPL-SCNC: 135 MMOL/L (ref 136–145)
WBC # BLD AUTO: 5.37 K/UL (ref 3.9–12.7)
WBC # BLD AUTO: 7.35 K/UL (ref 3.9–12.7)

## 2021-12-19 PROCEDURE — 63700000 PHARM REV CODE 250 ALT 637 W/O HCPCS: Performed by: STUDENT IN AN ORGANIZED HEALTH CARE EDUCATION/TRAINING PROGRAM

## 2021-12-19 PROCEDURE — 25000003 PHARM REV CODE 250: Performed by: STUDENT IN AN ORGANIZED HEALTH CARE EDUCATION/TRAINING PROGRAM

## 2021-12-19 PROCEDURE — 80048 BASIC METABOLIC PNL TOTAL CA: CPT | Performed by: STUDENT IN AN ORGANIZED HEALTH CARE EDUCATION/TRAINING PROGRAM

## 2021-12-19 PROCEDURE — 85025 COMPLETE CBC W/AUTO DIFF WBC: CPT | Performed by: STUDENT IN AN ORGANIZED HEALTH CARE EDUCATION/TRAINING PROGRAM

## 2021-12-19 PROCEDURE — 99239 PR HOSPITAL DISCHARGE DAY,>30 MIN: ICD-10-PCS | Mod: ,,, | Performed by: STUDENT IN AN ORGANIZED HEALTH CARE EDUCATION/TRAINING PROGRAM

## 2021-12-19 PROCEDURE — 36415 COLL VENOUS BLD VENIPUNCTURE: CPT | Performed by: STUDENT IN AN ORGANIZED HEALTH CARE EDUCATION/TRAINING PROGRAM

## 2021-12-19 PROCEDURE — 99239 HOSP IP/OBS DSCHRG MGMT >30: CPT | Mod: ,,, | Performed by: STUDENT IN AN ORGANIZED HEALTH CARE EDUCATION/TRAINING PROGRAM

## 2021-12-19 PROCEDURE — 63600175 PHARM REV CODE 636 W HCPCS: Performed by: STUDENT IN AN ORGANIZED HEALTH CARE EDUCATION/TRAINING PROGRAM

## 2021-12-19 RX ORDER — AMOXICILLIN AND CLAVULANATE POTASSIUM 875; 125 MG/1; MG/1
1 TABLET, FILM COATED ORAL EVERY 12 HOURS
Qty: 6 TABLET | Refills: 0 | Status: SHIPPED | OUTPATIENT
Start: 2021-12-19 | End: 2021-12-19 | Stop reason: SDUPTHER

## 2021-12-19 RX ORDER — AZITHROMYCIN 250 MG/1
250 TABLET, FILM COATED ORAL DAILY
Qty: 3 TABLET | Refills: 0 | Status: SHIPPED | OUTPATIENT
Start: 2021-12-20 | End: 2021-12-19

## 2021-12-19 RX ORDER — AMOXICILLIN AND CLAVULANATE POTASSIUM 875; 125 MG/1; MG/1
1 TABLET, FILM COATED ORAL EVERY 12 HOURS
Qty: 6 TABLET | Refills: 0 | Status: SHIPPED | OUTPATIENT
Start: 2021-12-19 | End: 2021-12-22

## 2021-12-19 RX ORDER — AZITHROMYCIN 250 MG/1
250 TABLET, FILM COATED ORAL DAILY
Qty: 3 TABLET | Refills: 0 | Status: SHIPPED | OUTPATIENT
Start: 2021-12-20 | End: 2021-12-23

## 2021-12-19 RX ADMIN — CEFTRIAXONE 2 G: 2 INJECTION, SOLUTION INTRAVENOUS at 01:12

## 2021-12-19 RX ADMIN — Medication 100 MG: at 08:12

## 2021-12-19 RX ADMIN — LISINOPRIL 10 MG: 10 TABLET ORAL at 08:12

## 2021-12-19 RX ADMIN — DOCUSATE SODIUM 100 MG: 100 CAPSULE, LIQUID FILLED ORAL at 08:12

## 2021-12-19 RX ADMIN — CHOLECALCIFEROL TAB 125 MCG (5000 UNIT) 5000 UNITS: 125 TAB at 08:12

## 2021-12-19 RX ADMIN — AZITHROMYCIN MONOHYDRATE 250 MG: 250 TABLET ORAL at 08:12

## 2021-12-19 NOTE — ED NOTES
Patient heading upstairs with transport and son. Pt belongings and wheelchair being taken care of by the son. Pt on the tele monitor.

## 2021-12-19 NOTE — NURSING
Pt arrived to room 647 with transport and accompanying son, the primary caregiver. Assisting with care and staying at the bedside. Pt is baseline confused with time and situation. Safety maintained. Bed alarm on.  Will monitor,

## 2021-12-19 NOTE — ED NOTES
"Patient's son informed me that he gave the patient her home dose of Seroquel. States that the patient was, "pulling at her leads and getting agitated". And that he "did not want to wait until her scheduled dose. Team informed.   "

## 2021-12-19 NOTE — ED NOTES
Tele box 0980 applied to pt. Naty in war room states able to see pt on monitor, rhythm *NS with HR 63.

## 2021-12-19 NOTE — DISCHARGE SUMMARY
[facility] Medicine  Discharge Summary      Patient Name: Luiza Childers  MRN: [id number]  Patient Class: IP- Inpatient  Admission Date: 12/18/2021  Hospital Length of Stay: 1 days  Discharge Date and Time:  12/19/2021 3:19 PM  Attending Physician: Denis Segura*   Discharging Provider: Denis Ballesteros MD  Primary Care Provider: Isis Richter DO      HPI:   Luiza Childers is a 88 y.o. with past medical history of HTN, CKD, dementia, who presented with cough. History obtained with assistance of patient's son at bedside. Patient was in her usual state of health until last night when she started to develop a dry cough, along with mild shortness of breath, and was febrile (up to 102 last night). She denies chest pain, abdominal pain, urinary symptoms, or changes in bowel habits. Notes that this morning she had some nausea but no vomiting. She was brought into the ED for evaluation.         Hospital Course:   In the ED, patient was hemodynamically stable and saturating well on room air. Labs with leukocytosis (13.6), elevated procal, UA negative, blood cultures drawn, flu and covid negative. CXR with interstitial opacities suspicious for mild edema or interstitial pneumonia. Patient was given rocephin and azithromycin and admitted to hospital medicine. Discussed with patient and her son and confirmed DNR status. Her white count improved and patient was afebrile, hemodynamically stable and asymptomatic. She was tolerating po and was at her baseline. Discussed with patient's son plan of care. She was subsequently discharged to complete po course of antibiotics for cap treatment.        Goals of Care Treatment Preferences:  Code Status: DNR      Final Active Diagnoses:    Diagnosis Date Noted POA    PRINCIPAL PROBLEM:  Pneumonia due to infectious organism [J18.9] 12/18/2021 Unknown    Dementia with behavioral disturbance [F03.91] 12/18/2021 Unknown    Hypertension associated  with diabetes [E11.59, I15.2] 12/16/2014 Yes    Diabetes mellitus, type 2 [E11.9]  Yes      Problems Resolved During this Admission:       Discharged Condition: good    Disposition: Home or Self Care    Follow Up:    Patient Instructions:      Diet Cardiac     Notify your health care provider if you experience any of the following:  temperature >100.4     Notify your health care provider if you experience any of the following:  persistent nausea and vomiting or diarrhea     Notify your health care provider if you experience any of the following:  redness, tenderness, or signs of infection (pain, swelling, redness, odor or green/yellow discharge around incision site)     Notify your health care provider if you experience any of the following:  difficulty breathing or increased cough     Notify your health care provider if you experience any of the following:  severe persistent headache     Notify your health care provider if you experience any of the following:  persistent dizziness, light-headedness, or visual disturbances     Notify your health care provider if you experience any of the following:  increased confusion or weakness     Activity as tolerated       Significant Diagnostic Studies: Labs: All labs within the past 24 hours have been reviewed    Pending Diagnostic Studies:     None         Medications:  Reconciled Home Medications:      Medication List      START taking these medications    amoxicillin-clavulanate 875-125mg 875-125 mg per tablet  Commonly known as: AUGMENTIN  Take 1 tablet by mouth every 12 (twelve) hours. for 3 days     azithromycin 250 MG tablet  Commonly known as: Z-GIRMA  Take 1 tablet (250 mg total) by mouth once daily. for 3 days  Start taking on: December 20, 2021        CONTINUE taking these medications    alpha lipoic acid 300 mg Cap  Take by mouth.     * blood sugar diagnostic Strp  Commonly known as: ACCU-CHEK GERMÁN PLUS TEST STRP  USE ONE STRIP TO TEST DAILY.     * blood sugar  diagnostic Strp  Commonly known as: ACCU-CHEK GERMÁN PLUS TEST STRP  USE ONE STRIP TO TEST DAILY.     cholecalciferol (vitamin D3) 125 mcg (5,000 unit) Tab  Take 5,000 Units by mouth once daily.     co-enzyme Q-10 30 mg capsule  Take 100 mg by mouth once daily.     COLACE ORAL  Take by mouth once daily.     cranberry 400 mg Cap  Take by mouth.     diclofenac sodium 1 % Gel  Commonly known as: VOLTAREN  Apply 2 g topically once daily.     ferrous sulfate 325 mg (65 mg iron) Tab tablet  Commonly known as: FEOSOL  Take 1 tablet (325 mg total) by mouth daily with breakfast.     furosemide 20 MG tablet  Commonly known as: LASIX  Take 1 tablet (20 mg total) by mouth once daily.     * lancets Misc  Check glucose daily     * lancets 33 gauge Misc  Commonly known as: ONETOUCH DELICA LANCETS  Check fasting glucose daily     LIDOcaine 5 %  Commonly known as: LIDODERM  Place 1 patch onto the skin once daily. Remove & Discard patch within 12 hours or as directed by MD     lisinopriL 10 MG tablet  Take 1 tablet (10 mg total) by mouth 2 (two) times daily.     OCUVITE PRESERVISION ORAL  Take by mouth.     pantoprazole 40 MG tablet  Commonly known as: PROTONIX  Take 1 tablet (40 mg total) by mouth once daily.     QUEtiapine 25 MG Tab  Commonly known as: SEROQUEL  Take 1 tablet (25 mg total) by mouth 2 (two) times daily as needed (agitation). Take 1/2 tablet po nightly prn insomnia     senna 8.6 mg tablet  Commonly known as: SENOKOT  Take 1 tablet by mouth daily as needed for Constipation.     TURMERIC ORAL  Take by mouth.         * This list has 4 medication(s) that are the same as other medications prescribed for you. Read the directions carefully, and ask your doctor or other care provider to review them with you.                Indwelling Lines/Drains at time of discharge:   Lines/Drains/Airways     None                 Time spent on the discharge of patient: 35 minutes         Denis Ballesteros MD  Department of Hospital  Medicine  [facility]

## 2021-12-19 NOTE — NURSING
Pt discharged home via W/C with belongings. Transportation provided by son, at bedside. VS stable. Meds to be picked up tomorrow morning from home pharmacy, [facility] [address].

## 2021-12-19 NOTE — PLAN OF CARE
Pt is AAOx2.  Pt remain free from fall and injuries. VS remain stable. Questions and concerns voiced and answered. Medication compliance. Son at the bedside, Pt attempted to get out of the bed few times. Bed alarm on. Call light in reach. Bed in low locked position. Side rails x2. Belongings at bedside.

## 2021-12-23 LAB
BACTERIA BLD CULT: NORMAL
BACTERIA BLD CULT: NORMAL

## 2022-01-10 ENCOUNTER — CLINICAL SUPPORT (OUTPATIENT)
Dept: CARDIOLOGY | Facility: HOSPITAL | Age: 87
End: 2022-01-10
Payer: MEDICARE

## 2022-01-10 DIAGNOSIS — R55 SYNCOPE AND COLLAPSE: ICD-10-CM

## 2022-01-10 DIAGNOSIS — Z95.0 PRESENCE OF CARDIAC PACEMAKER: ICD-10-CM

## 2022-01-10 DIAGNOSIS — R00.1 BRADYCARDIA, UNSPECIFIED: ICD-10-CM

## 2022-01-10 PROCEDURE — 93294 REM INTERROG EVL PM/LDLS PM: CPT | Mod: ,,, | Performed by: INTERNAL MEDICINE

## 2022-01-10 PROCEDURE — 93294 CARDIAC DEVICE CHECK - REMOTE: ICD-10-PCS | Mod: ,,, | Performed by: INTERNAL MEDICINE

## 2022-02-10 ENCOUNTER — TELEPHONE (OUTPATIENT)
Dept: CARDIOLOGY | Facility: HOSPITAL | Age: 87
End: 2022-02-10
Payer: MEDICARE

## 2022-02-10 NOTE — TELEPHONE ENCOUNTER
Patient's pacemaker has been identified as part of an advisory announced by [employer] in October 2021.  Called patient to discuss advisory and follow up care. No answer. Left detailed voicemail requesting call back to clinic to discuss.    Alert and oriented, no focal deficits, no motor or sensory deficits.

## 2022-02-10 NOTE — TELEPHONE ENCOUNTER
Patient's son pacemaker has been identified as part of an advisory announced by [employer] in October 2021.  Called patient to discuss advisory and follow up care.      Discussed compliance with remote home monitoring, and confirmed remote home monitor connected.      Patient denies any questions/concerns at this time.

## 2022-03-02 ENCOUNTER — TELEPHONE (OUTPATIENT)
Dept: INTERNAL MEDICINE | Facility: CLINIC | Age: 87
End: 2022-03-02
Payer: MEDICARE

## 2022-03-02 RX ORDER — HYDROXYZINE HYDROCHLORIDE 25 MG/1
25 TABLET, FILM COATED ORAL NIGHTLY
Qty: 30 TABLET | Refills: 11 | Status: SHIPPED | OUTPATIENT
Start: 2022-03-02

## 2022-03-02 NOTE — TELEPHONE ENCOUNTER
----- Message from Nicolette Gonzalez sent at 3/2/2022  3:06 PM CST -----  Contact: Ajit ruiz Ms. Gutierrez Mobile# [phone]  Requesting an RX refill or new RX.  Is this a refill or new RX: New   RX name and strength Vistaril   Is this a 30 day or 90 day RX: 90  Pharmacy name and phone # [id number] - [place] - [address]  [address] [address]  Phone: [phone] Fax: [phone]  The doctors have asked that we provide their patients with the following 2 reminders -- prescription refills can take up to 72 hours, and a friendly reminder that in the future you can use your [facility] account to request refills:   Comment: Ms. Gutierrez patients daughter would like for her mother to have refills also. If Ms. Gutierrez does not answer please call patients Mobile/Home [phone].

## 2022-03-03 NOTE — TELEPHONE ENCOUNTER
Called pt's daughter, Brenda, and let her know that vistaril was sent to her mom's pharmacy  She VU

## 2022-03-10 ENCOUNTER — TELEPHONE (OUTPATIENT)
Dept: INTERNAL MEDICINE | Facility: CLINIC | Age: 87
End: 2022-03-10
Payer: MEDICARE

## 2022-03-10 NOTE — TELEPHONE ENCOUNTER
For the insomnia, if need be, he can increase the vistaril from 25 to 50 mg in the evening if he needs to. I would leave the seroquel dosage as it is currently.     For her BP, we could definitely try increasing the dosage from 10 mg po bid to 20 mg in the AM and 10 mg in the PM and we can discuss how it did during the appt on the 17th.

## 2022-03-10 NOTE — TELEPHONE ENCOUNTER
Called pt's son, maria e, and he said that he is giving his mom 50 mg of seriquil at night and 25 mg in the morning. She is also getting 25 mg of the visteril at night and she is sleeping through the night accept getting up about 3 times a night to go to the restroom. The only problem now is that she is talking in her sleep  He also said her BP as been running a little on the high side. Average am is 160/80 and 170/60 in pm  He is giving her 10mg lisinopril in the am and 10mg in the pm.   Pt has a virtual visit with you on the 17th    He wants to know the maximum dose of both sleeping medications that he can give her without it causing problems  Also wants to know if he needs to increase her BP meds    Please advise

## 2022-03-10 NOTE — TELEPHONE ENCOUNTER
----- Message from Jess Burch sent at 3/10/2022  8:29 AM CST -----  Contact: Son/Aashish/[phone]  Pt's son(Aashish) said that he is calling in regards to needing to speak with the nurse pt stated that his mother is still having trouble sleeping. Please advise

## 2022-03-15 ENCOUNTER — TELEPHONE (OUTPATIENT)
Dept: INTERNAL MEDICINE | Facility: CLINIC | Age: 87
End: 2022-03-15
Payer: MEDICARE

## 2022-03-15 DIAGNOSIS — D50.9 IRON DEFICIENCY ANEMIA, UNSPECIFIED IRON DEFICIENCY ANEMIA TYPE: ICD-10-CM

## 2022-03-15 DIAGNOSIS — R79.9 ABNORMAL BLOOD CHEMISTRY: ICD-10-CM

## 2022-03-15 DIAGNOSIS — E11.3599 TYPE 2 DIABETES MELLITUS WITH PROLIFERATIVE RETINOPATHY WITHOUT MACULAR EDEMA, WITHOUT LONG-TERM CURRENT USE OF INSULIN, UNSPECIFIED LATERALITY: ICD-10-CM

## 2022-03-15 DIAGNOSIS — E87.1 HYPONATREMIA: ICD-10-CM

## 2022-03-15 DIAGNOSIS — D64.9 ANEMIA, UNSPECIFIED TYPE: ICD-10-CM

## 2022-03-15 DIAGNOSIS — E55.9 VITAMIN D DEFICIENCY: ICD-10-CM

## 2022-03-15 DIAGNOSIS — Z00.00 ANNUAL PHYSICAL EXAM: Primary | ICD-10-CM

## 2022-03-15 NOTE — TELEPHONE ENCOUNTER
----- Message from Grace Faulkner MA sent at 3/15/2022  9:34 AM CDT -----  Regarding: lab orders needed  Pt has a lab appt scheduled for Thursday 3/17 at the [facility] with no orders attached. Please place/link correct orders for labs to be done, thank you!

## 2022-03-15 NOTE — PROGRESS NOTES
Subjective:       Patient ID: Luiza Childers is a 88 y.o. female.    Chief Complaint: No chief complaint on file.    Patient is a 88 y.o.female with senile purpura, Parkinson's disease, peripheral vascular disease, type 2 diabetes with circulatory complications, dementia, stage III, kidney disease and type 2 diabetes with out macular edema who presents today for f/u    The patient location is: [place]  The chief complaint leading to consultation is: f/u    Visit type: audiovisual    Face to Face time with patient: 20   minutes of total time spent on the encounter, which includes face to face time and non-face to face time preparing to see the patient (eg, review of tests), Obtaining and/or reviewing separately obtained history, Documenting clinical information in the electronic or other health record, Independently interpreting results (not separately reported) and communicating results to the patient/family/caregiver, or Care coordination (not separately reported).         Each patient to whom he or she provides medical services by telemedicine is:  (1) informed of the relationship between the physician and patient and the respective role of any other health care provider with respect to management of the patient; and (2) notified that he or she may decline to receive medical services by telemedicine and may withdraw from such care at any time.    Notes:     Son is present today during virtual exam.  Patient has been sleeping better.  She is complaining of cold intolerance and fatigue.  Her blood pressure has been fairly well controlled with the lisinopril 20 mg in the morning and 10 mg in the afternoon.        148/65    CONSTIPATION; she has been taking Colace 50 mg b.i.d. with moderate relief    Anemia:  Within the last 3 months her hemoglobin has risen from 7.7-8.6.  However, her iron levels have remained low at 26 and her ferritin is 24.     Review of Systems   Constitutional: Negative for appetite change,  chills, diaphoresis and fever.   HENT: Negative for congestion, ear discharge, ear pain, postnasal drip, tinnitus, trouble swallowing and voice change.    Eyes: Negative for discharge, redness and itching.   Respiratory: Negative for cough, chest tightness, shortness of breath and wheezing.    Cardiovascular: Negative for chest pain, palpitations and leg swelling.   Gastrointestinal: Negative for abdominal pain, constipation, diarrhea, nausea and vomiting.   Endocrine: Negative for cold intolerance and heat intolerance.   Genitourinary: Negative for difficulty urinating, flank pain, hematuria and urgency.   Musculoskeletal: Negative for arthralgias, gait problem, myalgias and neck stiffness.   Skin: Negative for color change and rash.   Neurological: Negative for dizziness, seizures, syncope and headaches.   Hematological: Negative for adenopathy.   Psychiatric/Behavioral: Negative for agitation, behavioral problems, confusion and sleep disturbance.       Objective:      Physical Exam  Vitals and nursing note reviewed.   Constitutional:       General: She is not in acute distress.     Appearance: She is well-developed. She is not diaphoretic.   HENT:      Head: Normocephalic and atraumatic.      Right Ear: External ear normal.      Left Ear: External ear normal.      Nose: Nose normal.      Mouth/Throat:      Pharynx: No oropharyngeal exudate.   Eyes:      General: No scleral icterus.        Right eye: No discharge.         Left eye: No discharge.      Conjunctiva/sclera: Conjunctivae normal.      Pupils: Pupils are equal, round, and reactive to light.   Neck:      Thyroid: No thyromegaly.      Vascular: No JVD.      Trachea: No tracheal deviation.   Cardiovascular:      Rate and Rhythm: Normal rate.      Heart sounds: Normal heart sounds. No murmur heard.    No friction rub. No gallop.   Pulmonary:      Effort: Pulmonary effort is normal. No respiratory distress.      Breath sounds: Normal breath sounds. No  stridor. No wheezing or rales.   Chest:      Chest wall: No tenderness.   Abdominal:      General: Bowel sounds are normal. There is no distension.      Palpations: Abdomen is soft.      Tenderness: There is no abdominal tenderness. There is no rebound.   Musculoskeletal:         General: No tenderness.      Cervical back: Neck supple.   Lymphadenopathy:      Cervical: No cervical adenopathy.   Skin:     General: Skin is warm and dry.      Findings: No erythema or rash.   Neurological:      Mental Status: She is alert and oriented to person, place, and time.   Psychiatric:         Behavior: Behavior normal.         Assessment and Plan:       1. Senile purpura  Stable; monitor; labs reviewed  - TSH; Future    2. Parkinson's disease  Stable; monitor  - TSH; Future    3. Peripheral vascular disease, unspecified  Stable; monitor  - TSH; Future    4. Type 2 diabetes mellitus with other circulatory complications  Moderate control with diet.  - TSH; Future    5. Dementia with behavioral disturbance, unspecified dementia type  Improved mentation and behavior with seroquel  - TSH; Future    6. Stage 3 chronic kidney disease, unspecified whether stage 3a or 3b CKD  Monitor; stable  - TSH; Future    7. 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 Iron deficiency anemia, unspecified iron deficiency anemia type  Hemoglobin has remained stable but iron levels remain low.  Will message hematology T see if they recommend anything additional.  Patient previously had an iron infusion.  - TSH; Future  - CBC Auto Differential; Future  - Iron and TIBC; Future  - Ferritin; Future    9. Constipation, unspecified constipation type  Increase Colace to 100 mg p.o. b.i.d.    10. Hypertension associated with diabetes  Moderate control.  Continue current regimen of lisinopril.  At this point, and I would  prefer her blood pressure to run slightly elevated rather than her to be hypotensive given her anemia and age.  Son is in agreement with this recommendation.          No follow-ups on file.

## 2022-03-17 ENCOUNTER — LAB VISIT (OUTPATIENT)
Dept: LAB | Facility: HOSPITAL | Age: 87
End: 2022-03-17
Attending: INTERNAL MEDICINE
Payer: MEDICARE

## 2022-03-17 DIAGNOSIS — E87.1 HYPONATREMIA: ICD-10-CM

## 2022-03-17 DIAGNOSIS — R79.9 ABNORMAL BLOOD CHEMISTRY: ICD-10-CM

## 2022-03-17 DIAGNOSIS — D50.9 IRON DEFICIENCY ANEMIA, UNSPECIFIED IRON DEFICIENCY ANEMIA TYPE: ICD-10-CM

## 2022-03-17 DIAGNOSIS — Z00.00 ANNUAL PHYSICAL EXAM: ICD-10-CM

## 2022-03-17 DIAGNOSIS — E11.3599 TYPE 2 DIABETES MELLITUS WITH PROLIFERATIVE RETINOPATHY WITHOUT MACULAR EDEMA, WITHOUT LONG-TERM CURRENT USE OF INSULIN, UNSPECIFIED LATERALITY: ICD-10-CM

## 2022-03-17 DIAGNOSIS — D64.9 ANEMIA, UNSPECIFIED TYPE: ICD-10-CM

## 2022-03-17 DIAGNOSIS — E55.9 VITAMIN D DEFICIENCY: ICD-10-CM

## 2022-03-17 PROBLEM — D69.2 SENILE PURPURA: Status: ACTIVE | Noted: 2022-03-17

## 2022-03-17 PROBLEM — G20.A1 PARKINSON'S DISEASE: Status: ACTIVE | Noted: 2022-03-17

## 2022-03-17 PROBLEM — I73.9 PERIPHERAL VASCULAR DISEASE, UNSPECIFIED: Status: ACTIVE | Noted: 2022-03-17

## 2022-03-17 LAB
ALBUMIN SERPL BCP-MCNC: 3.7 G/DL (ref 3.5–5.2)
ALP SERPL-CCNC: 70 U/L (ref 55–135)
ALT SERPL W/O P-5'-P-CCNC: 20 U/L (ref 10–44)
ANION GAP SERPL CALC-SCNC: 11 MMOL/L (ref 8–16)
AST SERPL-CCNC: 17 U/L (ref 10–40)
BASOPHILS # BLD AUTO: 0.02 K/UL (ref 0–0.2)
BASOPHILS NFR BLD: 0.3 % (ref 0–1.9)
BILIRUB SERPL-MCNC: 0.2 MG/DL (ref 0.1–1)
BUN SERPL-MCNC: 37 MG/DL (ref 8–23)
CALCIUM SERPL-MCNC: 10.2 MG/DL (ref 8.7–10.5)
CHLORIDE SERPL-SCNC: 103 MMOL/L (ref 95–110)
CHOLEST SERPL-MCNC: 195 MG/DL (ref 120–199)
CHOLEST/HDLC SERPL: 4.1 {RATIO} (ref 2–5)
CO2 SERPL-SCNC: 25 MMOL/L (ref 23–29)
CREAT SERPL-MCNC: 1.1 MG/DL (ref 0.5–1.4)
DIFFERENTIAL METHOD: ABNORMAL
EOSINOPHIL # BLD AUTO: 0.3 K/UL (ref 0–0.5)
EOSINOPHIL NFR BLD: 3.8 % (ref 0–8)
ERYTHROCYTE [DISTWIDTH] IN BLOOD BY AUTOMATED COUNT: 16.2 % (ref 11.5–14.5)
EST. GFR  (AFRICAN AMERICAN): 51.8 ML/MIN/1.73 M^2
EST. GFR  (NON AFRICAN AMERICAN): 44.9 ML/MIN/1.73 M^2
ESTIMATED AVG GLUCOSE: 166 MG/DL (ref 68–131)
FERRITIN SERPL-MCNC: 24 NG/ML (ref 20–300)
GLUCOSE SERPL-MCNC: 179 MG/DL (ref 70–110)
HBA1C MFR BLD: 7.4 % (ref 4–5.6)
HCT VFR BLD AUTO: 28.5 % (ref 37–48.5)
HDLC SERPL-MCNC: 48 MG/DL (ref 40–75)
HDLC SERPL: 24.6 % (ref 20–50)
HGB BLD-MCNC: 8.6 G/DL (ref 12–16)
IMM GRANULOCYTES # BLD AUTO: 0.03 K/UL (ref 0–0.04)
IMM GRANULOCYTES NFR BLD AUTO: 0.5 % (ref 0–0.5)
IRON SERPL-MCNC: 26 UG/DL (ref 30–160)
LDLC SERPL CALC-MCNC: 131.4 MG/DL (ref 63–159)
LYMPHOCYTES # BLD AUTO: 1.2 K/UL (ref 1–4.8)
LYMPHOCYTES NFR BLD: 18.6 % (ref 18–48)
MCH RBC QN AUTO: 24.6 PG (ref 27–31)
MCHC RBC AUTO-ENTMCNC: 30.2 G/DL (ref 32–36)
MCV RBC AUTO: 82 FL (ref 82–98)
MONOCYTES # BLD AUTO: 0.5 K/UL (ref 0.3–1)
MONOCYTES NFR BLD: 8.2 % (ref 4–15)
NEUTROPHILS # BLD AUTO: 4.5 K/UL (ref 1.8–7.7)
NEUTROPHILS NFR BLD: 68.6 % (ref 38–73)
NONHDLC SERPL-MCNC: 147 MG/DL
NRBC BLD-RTO: 0 /100 WBC
PLATELET # BLD AUTO: 189 K/UL (ref 150–450)
PMV BLD AUTO: 11.9 FL (ref 9.2–12.9)
POTASSIUM SERPL-SCNC: 4.5 MMOL/L (ref 3.5–5.1)
PROT SERPL-MCNC: 7.1 G/DL (ref 6–8.4)
RBC # BLD AUTO: 3.49 M/UL (ref 4–5.4)
SATURATED IRON: 7 % (ref 20–50)
SODIUM SERPL-SCNC: 139 MMOL/L (ref 136–145)
T4 FREE SERPL-MCNC: 1.02 NG/DL (ref 0.71–1.51)
TOTAL IRON BINDING CAPACITY: 395 UG/DL (ref 250–450)
TRANSFERRIN SERPL-MCNC: 267 MG/DL (ref 200–375)
TRIGL SERPL-MCNC: 78 MG/DL (ref 30–150)
TSH SERPL DL<=0.005 MIU/L-ACNC: 4.45 UIU/ML (ref 0.4–4)
WBC # BLD AUTO: 6.6 K/UL (ref 3.9–12.7)

## 2022-03-17 PROCEDURE — 84443 ASSAY THYROID STIM HORMONE: CPT | Performed by: INTERNAL MEDICINE

## 2022-03-17 PROCEDURE — 84466 ASSAY OF TRANSFERRIN: CPT | Performed by: INTERNAL MEDICINE

## 2022-03-17 PROCEDURE — 80053 COMPREHEN METABOLIC PANEL: CPT | Performed by: INTERNAL MEDICINE

## 2022-03-17 PROCEDURE — 85025 COMPLETE CBC W/AUTO DIFF WBC: CPT | Performed by: INTERNAL MEDICINE

## 2022-03-17 PROCEDURE — 84439 ASSAY OF FREE THYROXINE: CPT | Performed by: INTERNAL MEDICINE

## 2022-03-17 PROCEDURE — 36415 COLL VENOUS BLD VENIPUNCTURE: CPT | Mod: PO | Performed by: INTERNAL MEDICINE

## 2022-03-17 PROCEDURE — 83036 HEMOGLOBIN GLYCOSYLATED A1C: CPT | Performed by: INTERNAL MEDICINE

## 2022-03-17 PROCEDURE — 80061 LIPID PANEL: CPT | Performed by: INTERNAL MEDICINE

## 2022-03-17 PROCEDURE — 82728 ASSAY OF FERRITIN: CPT | Performed by: INTERNAL MEDICINE

## 2022-03-22 ENCOUNTER — OFFICE VISIT (OUTPATIENT)
Dept: INTERNAL MEDICINE | Facility: CLINIC | Age: 87
End: 2022-03-22
Payer: MEDICARE

## 2022-03-22 DIAGNOSIS — I15.2 HYPERTENSION ASSOCIATED WITH DIABETES: ICD-10-CM

## 2022-03-22 DIAGNOSIS — E11.59 TYPE 2 DIABETES MELLITUS WITH OTHER CIRCULATORY COMPLICATIONS: ICD-10-CM

## 2022-03-22 DIAGNOSIS — E11.3599 TYPE 2 DIABETES MELLITUS WITH PROLIFERATIVE RETINOPATHY WITHOUT MACULAR EDEMA, WITHOUT LONG-TERM CURRENT USE OF INSULIN, UNSPECIFIED LATERALITY: ICD-10-CM

## 2022-03-22 DIAGNOSIS — I73.9 PERIPHERAL VASCULAR DISEASE, UNSPECIFIED: ICD-10-CM

## 2022-03-22 DIAGNOSIS — D50.9 IRON DEFICIENCY ANEMIA, UNSPECIFIED IRON DEFICIENCY ANEMIA TYPE: ICD-10-CM

## 2022-03-22 DIAGNOSIS — E11.59 HYPERTENSION ASSOCIATED WITH DIABETES: ICD-10-CM

## 2022-03-22 DIAGNOSIS — N18.30 STAGE 3 CHRONIC KIDNEY DISEASE, UNSPECIFIED WHETHER STAGE 3A OR 3B CKD: ICD-10-CM

## 2022-03-22 DIAGNOSIS — F03.91 DEMENTIA WITH BEHAVIORAL DISTURBANCE, UNSPECIFIED DEMENTIA TYPE: ICD-10-CM

## 2022-03-22 DIAGNOSIS — K59.00 CONSTIPATION, UNSPECIFIED CONSTIPATION TYPE: ICD-10-CM

## 2022-03-22 DIAGNOSIS — G20.A1 PARKINSON'S DISEASE: ICD-10-CM

## 2022-03-22 DIAGNOSIS — D69.2 SENILE PURPURA: Primary | ICD-10-CM

## 2022-03-22 PROCEDURE — 99214 OFFICE O/P EST MOD 30 MIN: CPT | Mod: 95,,, | Performed by: INTERNAL MEDICINE

## 2022-03-22 PROCEDURE — 99214 PR OFFICE/OUTPT VISIT, EST, LEVL IV, 30-39 MIN: ICD-10-PCS | Mod: 95,,, | Performed by: INTERNAL MEDICINE

## 2022-03-22 NOTE — Clinical Note
Dr yan: just wanted to get your input on this mutual patient. Her hemoglobin has remained stable but iron levels and ferritin have dropped s/p her iron infusion in April 2021. Do you recommend anything different for her at this point or just continue to monitor her labs. Son was curious if she needed another iron infusion. Thanks for your time  - Isis

## 2022-03-24 DIAGNOSIS — D50.9 IRON DEFICIENCY ANEMIA, UNSPECIFIED IRON DEFICIENCY ANEMIA TYPE: Primary | ICD-10-CM

## 2022-03-24 RX ORDER — SODIUM CHLORIDE 0.9 % (FLUSH) 0.9 %
10 SYRINGE (ML) INJECTION
Status: CANCELLED | OUTPATIENT
Start: 2022-03-31

## 2022-03-24 RX ORDER — HEPARIN 100 UNIT/ML
500 SYRINGE INTRAVENOUS
Status: CANCELLED | OUTPATIENT
Start: 2022-04-07

## 2022-03-24 RX ORDER — SODIUM CHLORIDE 0.9 % (FLUSH) 0.9 %
10 SYRINGE (ML) INJECTION
Status: CANCELLED | OUTPATIENT
Start: 2022-04-07

## 2022-03-24 RX ORDER — HEPARIN 100 UNIT/ML
500 SYRINGE INTRAVENOUS
Status: CANCELLED | OUTPATIENT
Start: 2022-03-31

## 2022-03-31 ENCOUNTER — TELEPHONE (OUTPATIENT)
Dept: INTERNAL MEDICINE | Facility: CLINIC | Age: 87
End: 2022-03-31
Payer: MEDICARE

## 2022-03-31 ENCOUNTER — INFUSION (OUTPATIENT)
Dept: INFUSION THERAPY | Facility: HOSPITAL | Age: 87
End: 2022-03-31
Payer: MEDICARE

## 2022-03-31 VITALS — SYSTOLIC BLOOD PRESSURE: 154 MMHG | HEART RATE: 61 BPM | DIASTOLIC BLOOD PRESSURE: 69 MMHG | RESPIRATION RATE: 18 BRPM

## 2022-03-31 DIAGNOSIS — D50.8 IRON DEFICIENCY ANEMIA SECONDARY TO INADEQUATE DIETARY IRON INTAKE: Primary | ICD-10-CM

## 2022-03-31 DIAGNOSIS — G47.00 INSOMNIA, UNSPECIFIED TYPE: ICD-10-CM

## 2022-03-31 DIAGNOSIS — F05 SUNDOWNING: Primary | ICD-10-CM

## 2022-03-31 PROCEDURE — 96365 THER/PROPH/DIAG IV INF INIT: CPT

## 2022-03-31 PROCEDURE — 25000003 PHARM REV CODE 250: Performed by: INTERNAL MEDICINE

## 2022-03-31 PROCEDURE — 63600175 PHARM REV CODE 636 W HCPCS: Mod: JG | Performed by: INTERNAL MEDICINE

## 2022-03-31 RX ORDER — HEPARIN 100 UNIT/ML
500 SYRINGE INTRAVENOUS
Status: DISCONTINUED | OUTPATIENT
Start: 2022-03-31 | End: 2022-03-31 | Stop reason: HOSPADM

## 2022-03-31 RX ORDER — SODIUM CHLORIDE 0.9 % (FLUSH) 0.9 %
10 SYRINGE (ML) INJECTION
Status: DISCONTINUED | OUTPATIENT
Start: 2022-03-31 | End: 2022-03-31 | Stop reason: HOSPADM

## 2022-03-31 RX ORDER — MIRTAZAPINE 15 MG/1
15 TABLET, FILM COATED ORAL NIGHTLY
Qty: 30 TABLET | Refills: 11 | Status: SHIPPED | OUTPATIENT
Start: 2022-03-31 | End: 2023-03-31

## 2022-03-31 RX ADMIN — FERRIC CARBOXYMALTOSE INJECTION 750 MG: 50 INJECTION, SOLUTION INTRAVENOUS at 07:03

## 2022-03-31 RX ADMIN — SODIUM CHLORIDE: 0.9 INJECTION, SOLUTION INTRAVENOUS at 07:03

## 2022-03-31 NOTE — TELEPHONE ENCOUNTER
Called pt's son, maria e, and he states that pt is not sleeping through the night. She goes to bed and it takes her about an hour to fall asleep but then she is up at least 2 x's a night to use the rest room and is now getting up in addition to that and she tells him she has to do something or get something.   She is taking 50mg of Seroquel and 50mg of Hydroxyzine    She also had an episode yesterday about 3:30, with the sitter, where she was combative and held up a shoe like she was going to hit her. He isn't sure if it had something to do with the weather or if this is something new she is going to start doing.     Please advise

## 2022-03-31 NOTE — TELEPHONE ENCOUNTER
We can try stopping hydroxyzine and trying mirtazapine instead for insomnia to see if that helps her sleep better and more sound. If that doesn't work, I would then defer to psychiatry to find a better medication for her. I entered the referral so we can give him the number to call and schedule and he can always cancel the appt if the med change works

## 2022-03-31 NOTE — TELEPHONE ENCOUNTER
----- Message from Nicky Cabrera sent at 3/31/2022 10:41 AM CDT -----  Contact: Aashish, yasmine [phone]  Son, Aashish is requesting a call from the nurse. He states patient is not sleeping at night due to agitation. Please advise

## 2022-03-31 NOTE — TELEPHONE ENCOUNTER
Called pt's son and let him know We can try stopping hydroxyzine and trying mirtazapine instead for insomnia to see if that helps her sleep better and more sound. If that doesn't work, I would then defer to psychiatry to find a better medication for her. I entered the referral so we can give him the number to call and schedule and he can always cancel the appt if the med change works  He VU and will keep us posted

## 2022-04-07 ENCOUNTER — INFUSION (OUTPATIENT)
Dept: INFUSION THERAPY | Facility: HOSPITAL | Age: 87
End: 2022-04-07
Payer: MEDICARE

## 2022-04-07 VITALS — RESPIRATION RATE: 18 BRPM | SYSTOLIC BLOOD PRESSURE: 151 MMHG | HEART RATE: 60 BPM | DIASTOLIC BLOOD PRESSURE: 75 MMHG

## 2022-04-07 DIAGNOSIS — D50.8 IRON DEFICIENCY ANEMIA SECONDARY TO INADEQUATE DIETARY IRON INTAKE: Primary | ICD-10-CM

## 2022-04-07 PROCEDURE — 25000003 PHARM REV CODE 250: Performed by: INTERNAL MEDICINE

## 2022-04-07 PROCEDURE — 63600175 PHARM REV CODE 636 W HCPCS: Mod: JG | Performed by: INTERNAL MEDICINE

## 2022-04-07 PROCEDURE — 96365 THER/PROPH/DIAG IV INF INIT: CPT

## 2022-04-07 RX ORDER — SODIUM CHLORIDE 0.9 % (FLUSH) 0.9 %
10 SYRINGE (ML) INJECTION
Status: DISCONTINUED | OUTPATIENT
Start: 2022-04-07 | End: 2022-04-07 | Stop reason: HOSPADM

## 2022-04-07 RX ORDER — HEPARIN 100 UNIT/ML
500 SYRINGE INTRAVENOUS
Status: DISCONTINUED | OUTPATIENT
Start: 2022-04-07 | End: 2022-04-07 | Stop reason: HOSPADM

## 2022-04-07 RX ADMIN — FERRIC CARBOXYMALTOSE INJECTION 750 MG: 50 INJECTION, SOLUTION INTRAVENOUS at 08:04

## 2022-04-07 RX ADMIN — SODIUM CHLORIDE: 9 INJECTION, SOLUTION INTRAVENOUS at 08:04

## 2022-04-07 NOTE — PLAN OF CARE
Pt arrived AAOx3 with son, who is her caretaker (professional medical). VSS. Son did not give pt her a.m. meds. Pt tolerated injectafer without incident and  was discharged in stable condition. Pt in W/C for distance, ambulates at home

## 2022-04-10 ENCOUNTER — CLINICAL SUPPORT (OUTPATIENT)
Dept: CARDIOLOGY | Facility: HOSPITAL | Age: 87
End: 2022-04-10
Payer: MEDICARE

## 2022-04-10 DIAGNOSIS — Z95.0 PRESENCE OF CARDIAC PACEMAKER: ICD-10-CM

## 2022-04-10 DIAGNOSIS — R00.1 BRADYCARDIA, UNSPECIFIED: ICD-10-CM

## 2022-04-13 ENCOUNTER — TELEPHONE (OUTPATIENT)
Dept: ELECTROPHYSIOLOGY | Facility: CLINIC | Age: 87
End: 2022-04-13
Payer: MEDICARE

## 2022-04-14 ENCOUNTER — HOSPITAL ENCOUNTER (INPATIENT)
Facility: HOSPITAL | Age: 87
LOS: 3 days | Discharge: HOSPICE | DRG: 189 | End: 2022-04-17
Attending: EMERGENCY MEDICINE | Admitting: STUDENT IN AN ORGANIZED HEALTH CARE EDUCATION/TRAINING PROGRAM
Payer: MEDICARE

## 2022-04-14 DIAGNOSIS — I15.2 HYPERTENSION ASSOCIATED WITH DIABETES: ICD-10-CM

## 2022-04-14 DIAGNOSIS — E11.22 CKD STAGE 3 DUE TO TYPE 2 DIABETES MELLITUS: ICD-10-CM

## 2022-04-14 DIAGNOSIS — R07.9 CHEST PAIN: ICD-10-CM

## 2022-04-14 DIAGNOSIS — E11.3599 TYPE 2 DIABETES MELLITUS WITH PROLIFERATIVE RETINOPATHY WITHOUT MACULAR EDEMA, WITHOUT LONG-TERM CURRENT USE OF INSULIN, UNSPECIFIED LATERALITY: ICD-10-CM

## 2022-04-14 DIAGNOSIS — N18.30 CKD STAGE 3 DUE TO TYPE 2 DIABETES MELLITUS: ICD-10-CM

## 2022-04-14 DIAGNOSIS — J96.90 RESPIRATORY FAILURE, UNSPECIFIED CHRONICITY, UNSPECIFIED WHETHER WITH HYPOXIA OR HYPERCAPNIA: Primary | ICD-10-CM

## 2022-04-14 DIAGNOSIS — R06.02 SHORTNESS OF BREATH: ICD-10-CM

## 2022-04-14 DIAGNOSIS — R91.8 MASS OF LOWER LOBE OF LUNG: ICD-10-CM

## 2022-04-14 DIAGNOSIS — I50.9 HEART FAILURE: ICD-10-CM

## 2022-04-14 DIAGNOSIS — E11.59 HYPERTENSION ASSOCIATED WITH DIABETES: ICD-10-CM

## 2022-04-14 PROBLEM — J96.01 ACUTE HYPOXEMIC RESPIRATORY FAILURE: Status: ACTIVE | Noted: 2022-04-14

## 2022-04-14 LAB
ALBUMIN SERPL BCP-MCNC: 3.6 G/DL (ref 3.5–5.2)
ALP SERPL-CCNC: 63 U/L (ref 55–135)
ALT SERPL W/O P-5'-P-CCNC: 19 U/L (ref 10–44)
ANION GAP SERPL CALC-SCNC: 9 MMOL/L (ref 8–16)
AST SERPL-CCNC: 17 U/L (ref 10–40)
BASOPHILS # BLD AUTO: 0.02 K/UL (ref 0–0.2)
BASOPHILS NFR BLD: 0.2 % (ref 0–1.9)
BILIRUB SERPL-MCNC: 0.4 MG/DL (ref 0.1–1)
BNP SERPL-MCNC: 298 PG/ML (ref 0–99)
BUN SERPL-MCNC: 29 MG/DL (ref 8–23)
CALCIUM SERPL-MCNC: 9.8 MG/DL (ref 8.7–10.5)
CHLORIDE SERPL-SCNC: 102 MMOL/L (ref 95–110)
CO2 SERPL-SCNC: 26 MMOL/L (ref 23–29)
CREAT SERPL-MCNC: 1.1 MG/DL (ref 0.5–1.4)
CTP QC/QA: YES
DIFFERENTIAL METHOD: ABNORMAL
EOSINOPHIL # BLD AUTO: 0.1 K/UL (ref 0–0.5)
EOSINOPHIL NFR BLD: 0.5 % (ref 0–8)
ERYTHROCYTE [DISTWIDTH] IN BLOOD BY AUTOMATED COUNT: 22.1 % (ref 11.5–14.5)
EST. GFR  (AFRICAN AMERICAN): 51.8 ML/MIN/1.73 M^2
EST. GFR  (NON AFRICAN AMERICAN): 44.9 ML/MIN/1.73 M^2
GLUCOSE SERPL-MCNC: 238 MG/DL (ref 70–110)
HCT VFR BLD AUTO: 30.2 % (ref 37–48.5)
HGB BLD-MCNC: 9.2 G/DL (ref 12–16)
IMM GRANULOCYTES # BLD AUTO: 0.04 K/UL (ref 0–0.04)
IMM GRANULOCYTES NFR BLD AUTO: 0.4 % (ref 0–0.5)
LYMPHOCYTES # BLD AUTO: 0.5 K/UL (ref 1–4.8)
LYMPHOCYTES NFR BLD: 4.1 % (ref 18–48)
MCH RBC QN AUTO: 25.8 PG (ref 27–31)
MCHC RBC AUTO-ENTMCNC: 30.5 G/DL (ref 32–36)
MCV RBC AUTO: 85 FL (ref 82–98)
MONOCYTES # BLD AUTO: 0.4 K/UL (ref 0.3–1)
MONOCYTES NFR BLD: 3.8 % (ref 4–15)
NEUTROPHILS # BLD AUTO: 10.1 K/UL (ref 1.8–7.7)
NEUTROPHILS NFR BLD: 91 % (ref 38–73)
NRBC BLD-RTO: 0 /100 WBC
PLATELET # BLD AUTO: 183 K/UL (ref 150–450)
PMV BLD AUTO: 11.7 FL (ref 9.2–12.9)
POCT GLUCOSE: 216 MG/DL (ref 70–110)
POTASSIUM SERPL-SCNC: 4.4 MMOL/L (ref 3.5–5.1)
PROCALCITONIN SERPL IA-MCNC: 0.06 NG/ML
PROT SERPL-MCNC: 7 G/DL (ref 6–8.4)
RBC # BLD AUTO: 3.57 M/UL (ref 4–5.4)
SARS-COV-2 RDRP RESP QL NAA+PROBE: NEGATIVE
SODIUM SERPL-SCNC: 137 MMOL/L (ref 136–145)
WBC # BLD AUTO: 11.11 K/UL (ref 3.9–12.7)

## 2022-04-14 PROCEDURE — 63600175 PHARM REV CODE 636 W HCPCS

## 2022-04-14 PROCEDURE — 99222 PR INITIAL HOSPITAL CARE,LEVL II: ICD-10-PCS | Mod: AI,,, | Performed by: STUDENT IN AN ORGANIZED HEALTH CARE EDUCATION/TRAINING PROGRAM

## 2022-04-14 PROCEDURE — 99222 1ST HOSP IP/OBS MODERATE 55: CPT | Mod: AI,,, | Performed by: STUDENT IN AN ORGANIZED HEALTH CARE EDUCATION/TRAINING PROGRAM

## 2022-04-14 PROCEDURE — 99285 EMERGENCY DEPT VISIT HI MDM: CPT | Mod: GC,CS,, | Performed by: EMERGENCY MEDICINE

## 2022-04-14 PROCEDURE — 63700000 PHARM REV CODE 250 ALT 637 W/O HCPCS: Performed by: STUDENT IN AN ORGANIZED HEALTH CARE EDUCATION/TRAINING PROGRAM

## 2022-04-14 PROCEDURE — 80053 COMPREHEN METABOLIC PANEL: CPT

## 2022-04-14 PROCEDURE — 84145 PROCALCITONIN (PCT): CPT | Performed by: STUDENT IN AN ORGANIZED HEALTH CARE EDUCATION/TRAINING PROGRAM

## 2022-04-14 PROCEDURE — 83880 ASSAY OF NATRIURETIC PEPTIDE: CPT

## 2022-04-14 PROCEDURE — 85025 COMPLETE CBC W/AUTO DIFF WBC: CPT

## 2022-04-14 PROCEDURE — 11000001 HC ACUTE MED/SURG PRIVATE ROOM

## 2022-04-14 PROCEDURE — U0002 COVID-19 LAB TEST NON-CDC: HCPCS

## 2022-04-14 PROCEDURE — 25000003 PHARM REV CODE 250: Performed by: STUDENT IN AN ORGANIZED HEALTH CARE EDUCATION/TRAINING PROGRAM

## 2022-04-14 PROCEDURE — 99285 EMERGENCY DEPT VISIT HI MDM: CPT | Mod: 25

## 2022-04-14 PROCEDURE — 99285 PR EMERGENCY DEPT VISIT,LEVEL V: ICD-10-PCS | Mod: GC,CS,, | Performed by: EMERGENCY MEDICINE

## 2022-04-14 PROCEDURE — 63600175 PHARM REV CODE 636 W HCPCS: Performed by: STUDENT IN AN ORGANIZED HEALTH CARE EDUCATION/TRAINING PROGRAM

## 2022-04-14 PROCEDURE — 36415 COLL VENOUS BLD VENIPUNCTURE: CPT | Performed by: STUDENT IN AN ORGANIZED HEALTH CARE EDUCATION/TRAINING PROGRAM

## 2022-04-14 RX ORDER — NALOXONE HCL 0.4 MG/ML
0.02 VIAL (ML) INJECTION
Status: DISCONTINUED | OUTPATIENT
Start: 2022-04-14 | End: 2022-04-17 | Stop reason: HOSPADM

## 2022-04-14 RX ORDER — LISINOPRIL 10 MG/1
10 TABLET ORAL 2 TIMES DAILY
Status: DISCONTINUED | OUTPATIENT
Start: 2022-04-14 | End: 2022-04-16

## 2022-04-14 RX ORDER — HYDROXYZINE HYDROCHLORIDE 25 MG/1
25 TABLET, FILM COATED ORAL NIGHTLY
Status: DISCONTINUED | OUTPATIENT
Start: 2022-04-14 | End: 2022-04-14

## 2022-04-14 RX ORDER — QUETIAPINE FUMARATE 25 MG/1
50 TABLET, FILM COATED ORAL NIGHTLY
Status: DISCONTINUED | OUTPATIENT
Start: 2022-04-14 | End: 2022-04-17 | Stop reason: HOSPADM

## 2022-04-14 RX ORDER — IBUPROFEN 200 MG
16 TABLET ORAL
Status: DISCONTINUED | OUTPATIENT
Start: 2022-04-14 | End: 2022-04-17 | Stop reason: HOSPADM

## 2022-04-14 RX ORDER — GLUCAGON 1 MG
1 KIT INJECTION
Status: DISCONTINUED | OUTPATIENT
Start: 2022-04-14 | End: 2022-04-17 | Stop reason: HOSPADM

## 2022-04-14 RX ORDER — IBUPROFEN 200 MG
16 TABLET ORAL
Status: DISCONTINUED | OUTPATIENT
Start: 2022-04-14 | End: 2022-04-14

## 2022-04-14 RX ORDER — INSULIN ASPART 100 [IU]/ML
0-5 INJECTION, SOLUTION INTRAVENOUS; SUBCUTANEOUS
Status: DISCONTINUED | OUTPATIENT
Start: 2022-04-14 | End: 2022-04-17 | Stop reason: HOSPADM

## 2022-04-14 RX ORDER — QUETIAPINE FUMARATE 25 MG/1
25 TABLET, FILM COATED ORAL 2 TIMES DAILY PRN
Status: DISCONTINUED | OUTPATIENT
Start: 2022-04-14 | End: 2022-04-17 | Stop reason: HOSPADM

## 2022-04-14 RX ORDER — SODIUM CHLORIDE 0.9 % (FLUSH) 0.9 %
10 SYRINGE (ML) INJECTION EVERY 12 HOURS PRN
Status: DISCONTINUED | OUTPATIENT
Start: 2022-04-14 | End: 2022-04-17 | Stop reason: HOSPADM

## 2022-04-14 RX ORDER — IBUPROFEN 200 MG
24 TABLET ORAL
Status: DISCONTINUED | OUTPATIENT
Start: 2022-04-14 | End: 2022-04-17 | Stop reason: HOSPADM

## 2022-04-14 RX ORDER — ENOXAPARIN SODIUM 100 MG/ML
30 INJECTION SUBCUTANEOUS EVERY 24 HOURS
Status: DISCONTINUED | OUTPATIENT
Start: 2022-04-14 | End: 2022-04-15

## 2022-04-14 RX ORDER — ACETAMINOPHEN 500 MG
5000 TABLET ORAL DAILY
Status: DISCONTINUED | OUTPATIENT
Start: 2022-04-14 | End: 2022-04-17 | Stop reason: HOSPADM

## 2022-04-14 RX ORDER — IBUPROFEN 200 MG
24 TABLET ORAL
Status: DISCONTINUED | OUTPATIENT
Start: 2022-04-14 | End: 2022-04-14

## 2022-04-14 RX ORDER — AZITHROMYCIN 250 MG/1
500 TABLET, FILM COATED ORAL DAILY
Status: DISCONTINUED | OUTPATIENT
Start: 2022-04-14 | End: 2022-04-14

## 2022-04-14 RX ORDER — DICLOFENAC SODIUM 10 MG/G
2 GEL TOPICAL DAILY
Status: DISCONTINUED | OUTPATIENT
Start: 2022-04-14 | End: 2022-04-14

## 2022-04-14 RX ORDER — ACETAMINOPHEN 325 MG/1
650 TABLET ORAL EVERY 4 HOURS PRN
Status: DISCONTINUED | OUTPATIENT
Start: 2022-04-14 | End: 2022-04-17 | Stop reason: HOSPADM

## 2022-04-14 RX ORDER — DOCUSATE SODIUM 100 MG/1
100 CAPSULE, LIQUID FILLED ORAL 2 TIMES DAILY
Status: DISCONTINUED | OUTPATIENT
Start: 2022-04-14 | End: 2022-04-17 | Stop reason: HOSPADM

## 2022-04-14 RX ORDER — GLUCAGON 1 MG
1 KIT INJECTION
Status: DISCONTINUED | OUTPATIENT
Start: 2022-04-14 | End: 2022-04-14

## 2022-04-14 RX ORDER — MIRTAZAPINE 15 MG/1
15 TABLET, FILM COATED ORAL NIGHTLY
Status: DISCONTINUED | OUTPATIENT
Start: 2022-04-14 | End: 2022-04-17 | Stop reason: HOSPADM

## 2022-04-14 RX ORDER — DOCUSATE SODIUM 100 MG/1
100 CAPSULE, LIQUID FILLED ORAL DAILY
Status: DISCONTINUED | OUTPATIENT
Start: 2022-04-15 | End: 2022-04-14

## 2022-04-14 RX ORDER — AZITHROMYCIN 250 MG/1
500 TABLET, FILM COATED ORAL DAILY
Status: COMPLETED | OUTPATIENT
Start: 2022-04-14 | End: 2022-04-16

## 2022-04-14 RX ORDER — LIDOCAINE 50 MG/G
1 PATCH TOPICAL DAILY
Status: DISCONTINUED | OUTPATIENT
Start: 2022-04-14 | End: 2022-04-14

## 2022-04-14 RX ADMIN — ENOXAPARIN SODIUM 30 MG: 30 INJECTION, SOLUTION INTRAVENOUS; SUBCUTANEOUS at 05:04

## 2022-04-14 RX ADMIN — AZITHROMYCIN MONOHYDRATE 500 MG: 250 TABLET ORAL at 03:04

## 2022-04-14 RX ADMIN — CEFTRIAXONE 1 G: 1 INJECTION, SOLUTION INTRAVENOUS at 03:04

## 2022-04-14 RX ADMIN — LISINOPRIL 10 MG: 10 TABLET ORAL at 08:04

## 2022-04-14 RX ADMIN — MIRTAZAPINE 15 MG: 15 TABLET, FILM COATED ORAL at 08:04

## 2022-04-14 RX ADMIN — QUETIAPINE FUMARATE 50 MG: 25 TABLET ORAL at 08:04

## 2022-04-14 RX ADMIN — CHOLECALCIFEROL TAB 125 MCG (5000 UNIT) 5000 UNITS: 125 TAB at 06:04

## 2022-04-14 NOTE — ASSESSMENT & PLAN NOTE
88 y.o. female with Hx of PVD, DM, dementia, CKD3, presenting with several days of cough and SOB c/f PNA    Pneumonia (CAP)  Patient with Hypoxic Respiratory failure which is Acute.  she is not on home oxygen. Supplemental oxygen was provided and noted-  .   Signs/symptoms of respiratory failure include- tachypnea. The leading etiology is pneumonia at this time, however CHF is not ruled out given her elevated BNP. Her CXR showed L sided developing consolidation and possible effusion, but her lack of fever or true leukocytosis is concerning for an alternate etiology. Will treat empirically for PNA and work up for alternate causes such as CHF  - CTX1g  Q24H (4/14-4/18)  - Azithro 500mg daily 4/14-4/16  - Procal - if negative will be more indicative of infx etiology  - TTE  - SLP eval for possible aspiration  - daily volume assessment

## 2022-04-14 NOTE — HPI
88 y.o. female with Hx of PVD, DM, dementia, CKD3, presenting with several days of cough and SOB c/f PNA    Of note the patient was admitted in January with CAP and recovered well. Most of the history is provided by her son due to the patient's dementia.    3 days prior to admission patient started having a slight cough, this worsened over the next several days and was more pronounced at night, also relieved somewhat by sitting up. On the day prior to admission the patient's son also noted some work of breathing and FRANK. No fevers, no sick contacts. Of note the patient is not on lasix and was only briefly on lasix during a prior admission.    Because of the SOB her son decided to bring her to the emergency room. In the ED she was HTN to 164/71, HR 78, RR 16, satting low on RA (90%), her CXR was notable for a possible developing consolidation in the LLL as well as possible small L pleural effusion, concerning for aspiration or PNA.    Labs were notable for WBC 11 (previous was 5-6), baseline hgb and Cr 1.1 (baseline), and  (slightly above baseline). She was given azithro and ctx and admitted for CAP.

## 2022-04-14 NOTE — ED PROVIDER NOTES
Encounter Date: 2022       History     Chief Complaint   Patient presents with    Cough     Couhging all night and elevated respirations and short of breath with light exertion for the last couple of day.     Ms. Childers is an 87 yo F PMH PVD, DM2, dementia, CKD3 who presents with cough. The cough started last night and has worsened this morning. There is no sputum production. She is also becoming short of breath and having increased work of breathing even to travel from one room to the next within the house. Patient was hospitalized for pneumonia in the past year. Presentation at the time of pneumonia was fevers. Today patient has no fevers, chills, nausea, vomiting, headache, dizziness, diarrhea or constipation.         Review of patient's allergies indicates:  No Known Allergies  Past Medical History:   Diagnosis Date    Diabetes mellitus, type 2     Hypertension     Syncope and collapse      Past Surgical History:   Procedure Laterality Date    CARDIAC PACEMAKER PLACEMENT      CATARACT EXTRACTION      COLONOSCOPY N/A 2020    Procedure: COLONOSCOPY;  Surgeon: Toro Burgos MD;  Location: [address]);  Service: Endoscopy;  Laterality: N/A;    ESOPHAGOGASTRODUODENOSCOPY N/A 2020    Procedure: EGD (ESOPHAGOGASTRODUODENOSCOPY);  Surgeon: Toro Burgos MD;  Location: [address]);  Service: Endoscopy;  Laterality: N/A;    HYSTERECTOMY       Family History   Family history unknown: Yes     Social History     Tobacco Use    Smoking status: Former Smoker     Years: 25.00     Quit date: 1974     Years since quittin.8    Smokeless tobacco: Never Used   Substance Use Topics    Alcohol use: No     Alcohol/week: 0.0 standard drinks    Drug use: No     Review of Systems   Constitutional: Positive for activity change and fatigue. Negative for appetite change, chills, diaphoresis and fever.   HENT: Negative for congestion and facial swelling.    Eyes: Negative for  discharge.   Respiratory: Positive for cough and shortness of breath. Negative for apnea, choking, chest tightness, wheezing and stridor.    Cardiovascular: Negative for chest pain and leg swelling.   Gastrointestinal: Negative for abdominal distention and abdominal pain.   Endocrine: Negative for cold intolerance and heat intolerance.   Genitourinary: Negative for difficulty urinating and dysuria.   Musculoskeletal: Negative for arthralgias and back pain.   Skin: Negative for color change.   Neurological: Negative for dizziness, light-headedness, numbness and headaches.   Psychiatric/Behavioral: Negative for agitation and behavioral problems.       Physical Exam     Initial Vitals [04/14/22 1102]   BP Pulse Resp Temp SpO2   (!) 164/71 78 16 98.5 °F (36.9 °C) 96 %      MAP       --         Physical Exam    Constitutional: She appears well-developed and well-nourished.   HENT:   Head: Normocephalic and atraumatic.   Eyes: EOM are normal. Pupils are equal, round, and reactive to light. No scleral icterus.   Neck: No tracheal deviation present. No JVD present.   Cardiovascular: Normal rate and regular rhythm.   No murmur heard.  Pulmonary/Chest: No stridor. No respiratory distress. She has no wheezes.   Abdominal: Abdomen is soft. She exhibits no distension. There is no abdominal tenderness. There is no rebound.   Musculoskeletal:         General: No edema.     Neurological: She is alert. No cranial nerve deficit.   Disoriented to time at baseline   Psychiatric: She has a normal mood and affect. Thought content normal.         ED Course   Procedures  Labs Reviewed   COMPREHENSIVE METABOLIC PANEL - Abnormal; Notable for the following components:       Result Value    Glucose 238 (*)     BUN 29 (*)     eGFR if  51.8 (*)     eGFR if non  44.9 (*)     All other components within normal limits   CBC W/ AUTO DIFFERENTIAL - Abnormal; Notable for the following components:    RBC 3.57 (*)      "Hemoglobin 9.2 (*)     Hematocrit 30.2 (*)     MCH 25.8 (*)     MCHC 30.5 (*)     RDW 22.1 (*)     Gran # (ANC) 10.1 (*)     Lymph # 0.5 (*)     Gran % 91.0 (*)     Lymph % 4.1 (*)     Mono % 3.8 (*)     All other components within normal limits   B-TYPE NATRIURETIC PEPTIDE - Abnormal; Notable for the following components:     (*)     All other components within normal limits   CULTURE, RESPIRATORY   B-TYPE NATRIURETIC PEPTIDE   B-TYPE NATRIURETIC PEPTIDE   PROCALCITONIN   SARS-COV-2 RDRP GENE   POCT GLUCOSE MONITORING CONTINUOUS          Imaging Results          X-Ray Chest PA And Lateral (Final result)  Result time 04/14/22 12:30:45    Final result by Irving Osborn MD (04/14/22 12:30:45)                 Impression:      New airspace disease versus consolidation in the left lung base with possible left pleural effusion.  Findings are likely related to pneumonia or aspiration.    Left perihilar opacity which may represent additional airspace disease though suggest continued attention on follow-up to exclude underlying mass.    Hiatal hernia.      Electronically signed by: Irving Osborn MD  Date:    04/14/2022  Time:    12:30             Narrative:    EXAMINATION:  XR CHEST PA AND LATERAL    CLINICAL HISTORY:  Provided history is "  Shortness of breath".    TECHNIQUE:  Frontal and lateral views of the chest were performed.    COMPARISON:  12/18/2021.    FINDINGS:  Cardiomediastinal silhouette is mildly enlarged and stable in size.  Left chest wall cardiac pacing device is present with transvenous leads overlying the heart.  There is central vascular congestion and coarsened perihilar interstitial lung markings.  New airspace disease/consolidation in the left lung base with possible adjacent pleural effusion.  There is a subtle opacity in the left perihilar region.  No sizable right pleural effusion.  No distinct pneumothorax.  Hiatal hernia is present.  Rim calcified structure overlies the upper abdomen " which may be related to the gallbladder.  There is a remote appearing compression deformity in the lower thoracic or upper lumbar spine.                                 Medications   cefTRIAXone (ROCEPHIN) 1 g/50 mL D5W IVPB (1 g Intravenous New Bag 4/14/22 1516)   cholecalciferol (vitamin D3) 125 mcg (5,000 unit) tablet 5,000 Units (has no administration in time range)   lisinopriL tablet 10 mg (has no administration in time range)   mirtazapine tablet 15 mg (has no administration in time range)   QUEtiapine tablet 25 mg (has no administration in time range)   sodium chloride 0.9% flush 10 mL (has no administration in time range)   naloxone 0.4 mg/mL injection 0.02 mg (has no administration in time range)   enoxaparin injection 30 mg (has no administration in time range)   acetaminophen tablet 650 mg (has no administration in time range)   glucose chewable tablet 16 g (has no administration in time range)   glucose chewable tablet 24 g (has no administration in time range)   dextrose 10% bolus 125 mL (has no administration in time range)   dextrose 10% bolus 250 mL (has no administration in time range)   glucagon (human recombinant) injection 1 mg (has no administration in time range)   insulin aspart U-100 pen 0-5 Units (has no administration in time range)   cefTRIAXone (ROCEPHIN) 1 g/50 mL D5W IVPB (has no administration in time range)   azithromycin tablet 500 mg (500 mg Oral Given 4/14/22 1519)   QUEtiapine tablet 50 mg (has no administration in time range)   docusate sodium capsule 100 mg (has no administration in time range)     Medical Decision Making:   Initial Assessment:   On my initial assessment I am concerned for pneumonia given patient has been hospitalized for pneumonia in the past year. I am also concerned for heart failure given the dyspnea on exertion although she does not sound fluid overloaded on exam and does not have swelling in her lower extremities. Lastly I am concerned for viral  respiratory tract infection. Given this patient's age I am more concerned for pneumonia.  ED Management:  CBC showed white count 11 and granulocytes 10 much higher than previous labwork  Chest xray showed possible pleural effusion on left side  Patient's O2 sats dropped to 84 on ambulation  Discussed admission with hospital medicine for respiratory failure possibly secondary to pneumonia  Started on azithromycin and ceftriaxone in the ED                      Clinical Impression:   Final diagnoses:  [R06.02] Shortness of breath  [J96.90] Respiratory failure, unspecified chronicity, unspecified whether with hypoxia or hypercapnia (Primary)          ED Disposition Condition    Admit               Lisa Webb MD  Resident  04/14/22 [id number]

## 2022-04-14 NOTE — PLAN OF CARE
Problem: Adult Inpatient Plan of Care  Goal: Plan of Care Review  Outcome: Ongoing, Progressing  Goal: Patient-Specific Goal (Individualized)  Outcome: Ongoing, Progressing  Goal: Absence of Hospital-Acquired Illness or Injury  Outcome: Ongoing, Progressing  Goal: Optimal Comfort and Wellbeing  Outcome: Ongoing, Progressing  Goal: Readiness for Transition of Care  Outcome: Ongoing, Progressing   AAOx2-3. VS stable. No falls or injuries. Bed low, wheels locked, side rails up x2, sitter(from home) at bedside. Will continue to monitor.

## 2022-04-14 NOTE — ASSESSMENT & PLAN NOTE
Patient pleasant at baseline, but with notable dementia, unable to recall her symptoms. Son reports significant sundowning controlled with PRN seroquel  - Seroquel 50mg HS and 25mg PRN BID  - Mirtazapine 15mg daily

## 2022-04-14 NOTE — ACP (ADVANCE CARE PLANNING)
Advance Care Planning     Date: 04/14/2022    Today a meeting took place: bedside and telephone    Patient Participation: Patient is unable to participate     Attendees (Name and  Relationship to patient): Health care power of : Ketan Zendejas    Staff attendees (Name and  Role): Antony Chavez      ACP Conversation (General): Discussed plan of care in event of cardiopulmonary arrest    Code Status: DNR; status confirmed/order placed in chart     ACP Documents: None      Accordingly, we have decided that the best plan to meet the patient's goals includes continuing with treatment      Recommendations/  Length of ACP   conversation in minutes: 5 minutes

## 2022-04-14 NOTE — H&P
[facility] Medicine  History & Physical    Patient Name: Luiza Childers  MRN: [id number]  Patient Class: IP- Inpatient  Admission Date: 4/14/2022  Attending Physician: Antony Chavez MD  Primary Care Provider: Isis Richter DO         Patient information was obtained from patient, relative(s), past medical records and ER records.     Subjective:     Principal Problem:Acute hypoxemic respiratory failure    Chief Complaint:   Chief Complaint   Patient presents with    Cough     Couhging all night and elevated respirations and short of breath with light exertion for the last couple of day.        HPI: 88 y.o. female with Hx of PVD, DM, dementia, CKD3, presenting with several days of cough and SOB c/f PNA    Of note the patient was admitted in January with CAP and recovered well. Most of the history is provided by her son due to the patient's dementia.    3 days prior to admission patient started having a slight cough, this worsened over the next several days and was more pronounced at night, also relieved somewhat by sitting up. On the day prior to admission the patient's son also noted some work of breathing and FRANK. No fevers, no sick contacts. Of note the patient is not on lasix and was only briefly on lasix during a prior admission.    Because of the SOB her son decided to bring her to the emergency room. In the ED she was HTN to 164/71, HR 78, RR 16, satting low on RA (90%), her CXR was notable for a possible developing consolidation in the LLL as well as possible small L pleural effusion, concerning for aspiration or PNA.    Labs were notable for WBC 11 (previous was 5-6), baseline hgb and Cr 1.1 (baseline), and  (slightly above baseline). She was given azithro and ctx and admitted for CAP.      Past Medical History:   Diagnosis Date    Diabetes mellitus, type 2     Hypertension     Syncope and collapse        Past Surgical History:   Procedure Laterality Date    CARDIAC  PACEMAKER PLACEMENT      CATARACT EXTRACTION      COLONOSCOPY N/A 6/18/2020    Procedure: COLONOSCOPY;  Surgeon: Toro Burgos MD;  [facility]  Service: Endoscopy;  Laterality: N/A;    ESOPHAGOGASTRODUODENOSCOPY N/A 6/18/2020    Procedure: EGD (ESOPHAGOGASTRODUODENOSCOPY);  Surgeon: Toro Burgos MD;  [facility]  Service: Endoscopy;  Laterality: N/A;    HYSTERECTOMY         Review of patient's allergies indicates:  No Known Allergies    No current facility-administered medications on file prior to encounter.     Current Outpatient Medications on File Prior to Encounter   Medication Sig    alpha lipoic acid 300 mg Cap Take by mouth.    blood sugar diagnostic (ACCU-CHEK GERMÁN PLUS TEST STRP) Strp USE ONE STRIP TO TEST DAILY.    blood sugar diagnostic (ACCU-CHEK GERMÁN PLUS TEST STRP) Strp USE ONE STRIP TO TEST DAILY.    cholecalciferol, vitamin D3, 125 mcg (5,000 unit) Tab Take 5,000 Units by mouth once daily.     co-enzyme Q-10 30 mg capsule Take 100 mg by mouth once daily.     cranberry 400 mg Cap Take by mouth.    diclofenac sodium (VOLTAREN) 1 % Gel Apply 2 g topically once daily.    docusate sodium (COLACE ORAL) Take by mouth once daily.    ferrous sulfate (FEOSOL) 325 mg (65 mg iron) Tab tablet Take 1 tablet (325 mg total) by mouth daily with breakfast.    furosemide (LASIX) 20 MG tablet Take 1 tablet (20 mg total) by mouth once daily.    hydrOXYzine HCL (ATARAX) 25 MG tablet Take 1 tablet (25 mg total) by mouth every evening.    lancets (ONETOUCH DELICA LANCETS) 33 gauge Misc Check fasting glucose daily    lancets Misc Check glucose daily    LIDOcaine (LIDODERM) 5 % Place 1 patch onto the skin once daily. Remove & Discard patch within 12 hours or as directed by MD    lisinopriL 10 MG tablet Take 1 tablet (10 mg total) by mouth 2 (two) times daily.    mirtazapine (REMERON) 15 MG tablet Take 1 tablet (15 mg total) by mouth every evening.     pantoprazole (PROTONIX) 40 MG tablet Take 1 tablet (40 mg total) by mouth once daily.    QUEtiapine (SEROQUEL) 25 MG Tab Take 1 tablet (25 mg total) by mouth 2 (two) times daily as needed (agitation). Take 1/2 tablet po nightly prn insomnia    senna (SENOKOT) 8.6 mg tablet Take 1 tablet by mouth daily as needed for Constipation.    TURMERIC ORAL Take by mouth.    vit A/vit C/vit E/zinc/copper (OCUVITE PRESERVISION ORAL) Take by mouth.     Family History       Family history is unknown by patient.          Tobacco Use    Smoking status: Former Smoker     Years: 25.00     Quit date: 1974     Years since quittin.8    Smokeless tobacco: Never Used   Substance and Sexual Activity    Alcohol use: No     Alcohol/week: 0.0 standard drinks    Drug use: No    Sexual activity: Not Currently     Review of Systems   Reason unable to perform ROS: LImited due to patients mentation, obtained from son.   Constitutional:  Negative for fatigue and fever.   Respiratory:  Positive for cough and shortness of breath. Negative for wheezing.    Cardiovascular:  Negative for chest pain and leg swelling.   Gastrointestinal:  Negative for abdominal distention and abdominal pain.   Genitourinary:  Negative for dysuria and frequency.   Musculoskeletal:  Negative for arthralgias and back pain.   Skin:  Negative for color change.   Neurological:  Negative for dizziness and headaches.   Objective:     Vital Signs (Most Recent):  Temp: 98.3 °F (36.8 °C) (22)  Pulse: 68 (22)  Resp: 18 (22)  BP: (!) 172/75 (22)  SpO2: 96 % (22)   Vital Signs (24h Range):  Temp:  [98.3 °F (36.8 °C)-98.5 °F (36.9 °C)] 98.3 °F (36.8 °C)  Pulse:  [67-78] 68  Resp:  [16-20] 18  SpO2:  [90 %-98 %] 96 %  BP: (164-172)/(71-79) 172/75     Weight: 63.5 kg (140 lb 0.2 oz)  Body mass index is 27.34 kg/m².    Physical Exam  Constitutional:       General: She is not in acute distress.     Appearance: Normal  appearance. She is not ill-appearing.   HENT:      Head: Normocephalic and atraumatic.      Mouth/Throat:      Mouth: Mucous membranes are dry.      Pharynx: No oropharyngeal exudate or posterior oropharyngeal erythema.   Eyes:      Extraocular Movements: Extraocular movements intact.      Pupils: Pupils are equal, round, and reactive to light.   Cardiovascular:      Rate and Rhythm: Normal rate and regular rhythm.      Pulses: Normal pulses.      Heart sounds: Normal heart sounds.   Pulmonary:      Effort: Pulmonary effort is normal. No respiratory distress.      Breath sounds: Normal breath sounds.      Comments: Crackles on L  Abdominal:      General: There is no distension.      Palpations: There is no mass.   Musculoskeletal:      Right lower leg: No edema.      Left lower leg: No edema.   Skin:     General: Skin is warm and dry.      Capillary Refill: Capillary refill takes less than 2 seconds.   Neurological:      General: No focal deficit present.      Mental Status: She is alert.      Comments: Oriented to self and location, not oriented to time   Psychiatric:         Mood and Affect: Mood normal.         Behavior: Behavior normal.         CRANIAL NERVES     CN III, IV, VI   Pupils are equal, round, and reactive to light.     Significant Labs: All pertinent labs within the past 24 hours have been reviewed.  CBC:   Recent Labs   Lab 04/14/22  1241   WBC 11.11   HGB 9.2*   HCT 30.2*        CMP:   Recent Labs   Lab 04/14/22  1214      K 4.4      CO2 26   *   BUN 29*   CREATININE 1.1   CALCIUM 9.8   PROT 7.0   ALBUMIN 3.6   BILITOT 0.4   ALKPHOS 63   AST 17   ALT 19   ANIONGAP 9   EGFRNONAA 44.9*       Significant Imaging: I have reviewed all pertinent imaging results/findings within the past 24 hours.    Assessment/Plan:     * Acute hypoxemic respiratory failure  88 y.o. female with Hx of PVD, DM, dementia, CKD3, presenting with several days of cough and SOB c/f PNA    Pneumonia  (CAP)  Patient with Hypoxic Respiratory failure which is Acute.  she is not on home oxygen. Supplemental oxygen was provided and noted-  .   Signs/symptoms of respiratory failure include- tachypnea. The leading etiology is pneumonia at this time, however CHF is not ruled out given her elevated BNP. Her CXR showed L sided developing consolidation and possible effusion, but her lack of fever or true leukocytosis is concerning for an alternate etiology. Will treat empirically for PNA and work up for alternate causes such as CHF  - CTX1g  Q24H (4/14-4/18)  - Azithro 500mg daily 4/14-4/16  - Procal - if negative will be more indicative of infx etiology  - TTE  - SLP eval for possible aspiration  - daily volume assessment    Dementia with behavioral disturbance  Patient pleasant at baseline, but with notable dementia, unable to recall her symptoms. 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

## 2022-04-14 NOTE — SUBJECTIVE & OBJECTIVE
Past Medical History:   Diagnosis Date    Diabetes mellitus, type 2     Hypertension     Syncope and collapse        Past Surgical History:   Procedure Laterality Date    CARDIAC PACEMAKER PLACEMENT      CATARACT EXTRACTION      COLONOSCOPY N/A 6/18/2020    Procedure: COLONOSCOPY;  Surgeon: Toro Burgos MD;  Location: [facility] ([address] [place] [address]);  Service: Endoscopy;  Laterality: N/A;    ESOPHAGOGASTRODUODENOSCOPY N/A 6/18/2020    Procedure: EGD (ESOPHAGOGASTRODUODENOSCOPY);  Surgeon: Toro Burgos MD;  Location: [facility] ([address] [place] [address]);  Service: Endoscopy;  Laterality: N/A;    HYSTERECTOMY         Review of patient's allergies indicates:  No Known Allergies    No current facility-administered medications on file prior to encounter.     Current Outpatient Medications on File Prior to Encounter   Medication Sig    alpha lipoic acid 300 mg Cap Take by mouth.    blood sugar diagnostic (ACCU-CHEK GERMÁN PLUS TEST STRP) Strp USE ONE STRIP TO TEST DAILY.    blood sugar diagnostic (ACCU-CHEK GERMÁN PLUS TEST STRP) Strp USE ONE STRIP TO TEST DAILY.    cholecalciferol, vitamin D3, 125 mcg (5,000 unit) Tab Take 5,000 Units by mouth once daily.     co-enzyme Q-10 30 mg capsule Take 100 mg by mouth once daily.     cranberry 400 mg Cap Take by mouth.    diclofenac sodium (VOLTAREN) 1 % Gel Apply 2 g topically once daily.    docusate sodium (COLACE ORAL) Take by mouth once daily.    ferrous sulfate (FEOSOL) 325 mg (65 mg iron) Tab tablet Take 1 tablet (325 mg total) by mouth daily with breakfast.    furosemide (LASIX) 20 MG tablet Take 1 tablet (20 mg total) by mouth once daily.    hydrOXYzine HCL (ATARAX) 25 MG tablet Take 1 tablet (25 mg total) by mouth every evening.    lancets (ONETOUCH DELICA LANCETS) 33 gauge Misc Check fasting glucose daily    lancets Misc Check glucose daily    LIDOcaine (LIDODERM) 5 % Place 1 patch onto the skin once daily. Remove & Discard patch within 12 hours or as directed by MD    lisinopriL 10 MG  tablet Take 1 tablet (10 mg total) by mouth 2 (two) times daily.    mirtazapine (REMERON) 15 MG tablet Take 1 tablet (15 mg total) by mouth every evening.    pantoprazole (PROTONIX) 40 MG tablet Take 1 tablet (40 mg total) by mouth once daily.    QUEtiapine (SEROQUEL) 25 MG Tab Take 1 tablet (25 mg total) by mouth 2 (two) times daily as needed (agitation). Take 1/2 tablet po nightly prn insomnia    senna (SENOKOT) 8.6 mg tablet Take 1 tablet by mouth daily as needed for Constipation.    TURMERIC ORAL Take by mouth.    vit A/vit C/vit E/zinc/copper (OCUVITE PRESERVISION ORAL) Take by mouth.     Family History       Family history is unknown by patient.          Tobacco Use    Smoking status: Former Smoker     Years: 25.00     Quit date: 1974     Years since quittin.8    Smokeless tobacco: Never Used   Substance and Sexual Activity    Alcohol use: No     Alcohol/week: 0.0 standard drinks    Drug use: No    Sexual activity: Not Currently     Review of Systems   Reason unable to perform ROS: LImited due to patients mentation, obtained from son.   Constitutional:  Negative for fatigue and fever.   Respiratory:  Positive for cough and shortness of breath. Negative for wheezing.    Cardiovascular:  Negative for chest pain and leg swelling.   Gastrointestinal:  Negative for abdominal distention and abdominal pain.   Genitourinary:  Negative for dysuria and frequency.   Musculoskeletal:  Negative for arthralgias and back pain.   Skin:  Negative for color change.   Neurological:  Negative for dizziness and headaches.   Objective:     Vital Signs (Most Recent):  Temp: 98.3 °F (36.8 °C) (22)  Pulse: 68 (22)  Resp: 18 (22)  BP: (!) 172/75 (22)  SpO2: 96 % (22)   Vital Signs (24h Range):  Temp:  [98.3 °F (36.8 °C)-98.5 °F (36.9 °C)] 98.3 °F (36.8 °C)  Pulse:  [67-78] 68  Resp:  [16-20] 18  SpO2:  [90 %-98 %] 96 %  BP: (164-172)/(71-79) 172/75     Weight: 63.5 kg  (140 lb 0.2 oz)  Body mass index is 27.34 kg/m².    Physical Exam  Constitutional:       General: She is not in acute distress.     Appearance: Normal appearance. She is not ill-appearing.   HENT:      Head: Normocephalic and atraumatic.      Mouth/Throat:      Mouth: Mucous membranes are dry.      Pharynx: No oropharyngeal exudate or posterior oropharyngeal erythema.   Eyes:      Extraocular Movements: Extraocular movements intact.      Pupils: Pupils are equal, round, and reactive to light.   Cardiovascular:      Rate and Rhythm: Normal rate and regular rhythm.      Pulses: Normal pulses.      Heart sounds: Normal heart sounds.   Pulmonary:      Effort: Pulmonary effort is normal. No respiratory distress.      Breath sounds: Normal breath sounds.      Comments: Crackles on L  Abdominal:      General: There is no distension.      Palpations: There is no mass.   Musculoskeletal:      Right lower leg: No edema.      Left lower leg: No edema.   Skin:     General: Skin is warm and dry.      Capillary Refill: Capillary refill takes less than 2 seconds.   Neurological:      General: No focal deficit present.      Mental Status: She is alert.      Comments: Oriented to self and location, not oriented to time   Psychiatric:         Mood and Affect: Mood normal.         Behavior: Behavior normal.         CRANIAL NERVES     CN III, IV, VI   Pupils are equal, round, and reactive to light.     Significant Labs: All pertinent labs within the past 24 hours have been reviewed.  CBC:   Recent Labs   Lab 04/14/22  1241   WBC 11.11   HGB 9.2*   HCT 30.2*        CMP:   Recent Labs   Lab 04/14/22  1214      K 4.4      CO2 26   *   BUN 29*   CREATININE 1.1   CALCIUM 9.8   PROT 7.0   ALBUMIN 3.6   BILITOT 0.4   ALKPHOS 63   AST 17   ALT 19   ANIONGAP 9   EGFRNONAA 44.9*       Significant Imaging: I have reviewed all pertinent imaging results/findings within the past 24 hours.

## 2022-04-15 LAB
ANION GAP SERPL CALC-SCNC: 8 MMOL/L (ref 8–16)
BASOPHILS # BLD AUTO: 0.02 K/UL (ref 0–0.2)
BASOPHILS NFR BLD: 0.2 % (ref 0–1.9)
BUN SERPL-MCNC: 23 MG/DL (ref 8–23)
CALCIUM SERPL-MCNC: 9 MG/DL (ref 8.7–10.5)
CHLORIDE SERPL-SCNC: 102 MMOL/L (ref 95–110)
CO2 SERPL-SCNC: 25 MMOL/L (ref 23–29)
CREAT SERPL-MCNC: 1 MG/DL (ref 0.5–1.4)
DIFFERENTIAL METHOD: ABNORMAL
EOSINOPHIL # BLD AUTO: 0.1 K/UL (ref 0–0.5)
EOSINOPHIL NFR BLD: 1 % (ref 0–8)
ERYTHROCYTE [DISTWIDTH] IN BLOOD BY AUTOMATED COUNT: 22.3 % (ref 11.5–14.5)
EST. GFR  (AFRICAN AMERICAN): 58.1 ML/MIN/1.73 M^2
EST. GFR  (NON AFRICAN AMERICAN): 50.4 ML/MIN/1.73 M^2
GLUCOSE SERPL-MCNC: 164 MG/DL (ref 70–110)
HCT VFR BLD AUTO: 27.1 % (ref 37–48.5)
HGB BLD-MCNC: 8.4 G/DL (ref 12–16)
IMM GRANULOCYTES # BLD AUTO: 0.02 K/UL (ref 0–0.04)
IMM GRANULOCYTES NFR BLD AUTO: 0.2 % (ref 0–0.5)
LYMPHOCYTES # BLD AUTO: 0.9 K/UL (ref 1–4.8)
LYMPHOCYTES NFR BLD: 10.8 % (ref 18–48)
MCH RBC QN AUTO: 26.1 PG (ref 27–31)
MCHC RBC AUTO-ENTMCNC: 31 G/DL (ref 32–36)
MCV RBC AUTO: 84 FL (ref 82–98)
MONOCYTES # BLD AUTO: 0.4 K/UL (ref 0.3–1)
MONOCYTES NFR BLD: 5.3 % (ref 4–15)
NEUTROPHILS # BLD AUTO: 6.7 K/UL (ref 1.8–7.7)
NEUTROPHILS NFR BLD: 82.5 % (ref 38–73)
NRBC BLD-RTO: 0 /100 WBC
PLATELET # BLD AUTO: 189 K/UL (ref 150–450)
PMV BLD AUTO: 11.8 FL (ref 9.2–12.9)
POCT GLUCOSE: 131 MG/DL (ref 70–110)
POCT GLUCOSE: 137 MG/DL (ref 70–110)
POCT GLUCOSE: 169 MG/DL (ref 70–110)
POCT GLUCOSE: 216 MG/DL (ref 70–110)
POCT GLUCOSE: 250 MG/DL (ref 70–110)
POTASSIUM SERPL-SCNC: 4 MMOL/L (ref 3.5–5.1)
RBC # BLD AUTO: 3.22 M/UL (ref 4–5.4)
SODIUM SERPL-SCNC: 135 MMOL/L (ref 136–145)
WBC # BLD AUTO: 8.17 K/UL (ref 3.9–12.7)

## 2022-04-15 PROCEDURE — 99232 PR SUBSEQUENT HOSPITAL CARE,LEVL II: ICD-10-PCS | Mod: ,,, | Performed by: STUDENT IN AN ORGANIZED HEALTH CARE EDUCATION/TRAINING PROGRAM

## 2022-04-15 PROCEDURE — 11000001 HC ACUTE MED/SURG PRIVATE ROOM

## 2022-04-15 PROCEDURE — 63600175 PHARM REV CODE 636 W HCPCS: Performed by: STUDENT IN AN ORGANIZED HEALTH CARE EDUCATION/TRAINING PROGRAM

## 2022-04-15 PROCEDURE — 25000003 PHARM REV CODE 250: Performed by: STUDENT IN AN ORGANIZED HEALTH CARE EDUCATION/TRAINING PROGRAM

## 2022-04-15 PROCEDURE — 94761 N-INVAS EAR/PLS OXIMETRY MLT: CPT

## 2022-04-15 PROCEDURE — 80048 BASIC METABOLIC PNL TOTAL CA: CPT | Performed by: STUDENT IN AN ORGANIZED HEALTH CARE EDUCATION/TRAINING PROGRAM

## 2022-04-15 PROCEDURE — 92610 EVALUATE SWALLOWING FUNCTION: CPT

## 2022-04-15 PROCEDURE — 25000003 PHARM REV CODE 250: Performed by: HOSPITALIST

## 2022-04-15 PROCEDURE — 85025 COMPLETE CBC W/AUTO DIFF WBC: CPT | Performed by: STUDENT IN AN ORGANIZED HEALTH CARE EDUCATION/TRAINING PROGRAM

## 2022-04-15 PROCEDURE — 63700000 PHARM REV CODE 250 ALT 637 W/O HCPCS: Performed by: STUDENT IN AN ORGANIZED HEALTH CARE EDUCATION/TRAINING PROGRAM

## 2022-04-15 PROCEDURE — 99232 SBSQ HOSP IP/OBS MODERATE 35: CPT | Mod: ,,, | Performed by: STUDENT IN AN ORGANIZED HEALTH CARE EDUCATION/TRAINING PROGRAM

## 2022-04-15 PROCEDURE — 36415 COLL VENOUS BLD VENIPUNCTURE: CPT | Performed by: STUDENT IN AN ORGANIZED HEALTH CARE EDUCATION/TRAINING PROGRAM

## 2022-04-15 RX ORDER — ENOXAPARIN SODIUM 100 MG/ML
40 INJECTION SUBCUTANEOUS EVERY 24 HOURS
Status: DISCONTINUED | OUTPATIENT
Start: 2022-04-15 | End: 2022-04-17 | Stop reason: HOSPADM

## 2022-04-15 RX ADMIN — DOCUSATE SODIUM 100 MG: 100 CAPSULE, LIQUID FILLED ORAL at 08:04

## 2022-04-15 RX ADMIN — AZITHROMYCIN MONOHYDRATE 500 MG: 250 TABLET ORAL at 09:04

## 2022-04-15 RX ADMIN — INSULIN ASPART 1 UNITS: 100 INJECTION, SOLUTION INTRAVENOUS; SUBCUTANEOUS at 12:04

## 2022-04-15 RX ADMIN — MIRTAZAPINE 15 MG: 15 TABLET, FILM COATED ORAL at 08:04

## 2022-04-15 RX ADMIN — DOCUSATE SODIUM 100 MG: 100 CAPSULE, LIQUID FILLED ORAL at 09:04

## 2022-04-15 RX ADMIN — LISINOPRIL 10 MG: 10 TABLET ORAL at 08:04

## 2022-04-15 RX ADMIN — LISINOPRIL 10 MG: 10 TABLET ORAL at 09:04

## 2022-04-15 RX ADMIN — ENOXAPARIN SODIUM 40 MG: 40 INJECTION SUBCUTANEOUS at 05:04

## 2022-04-15 RX ADMIN — CEFTRIAXONE 1 G: 1 INJECTION, SOLUTION INTRAVENOUS at 03:04

## 2022-04-15 RX ADMIN — QUETIAPINE FUMARATE 50 MG: 25 TABLET ORAL at 08:04

## 2022-04-15 RX ADMIN — CHOLECALCIFEROL TAB 125 MCG (5000 UNIT) 5000 UNITS: 125 TAB at 09:04

## 2022-04-15 NOTE — ASSESSMENT & PLAN NOTE
88 y.o. female with Hx of PVD, DM, dementia, CKD3, presenting with several days of cough and SOB c/f PNA now with CT findings c/f lung cancer    Pneumonia (CAP)  Patient with Hypoxic Respiratory failure which is Acute.  she is not on home oxygen. Supplemental oxygen was provided and noted-  .   Signs/symptoms of respiratory failure include- tachypnea. The leading etiology is pneumonia at this time, however CHF is not ruled out given her elevated BNP. Her CXR showed L sided developing consolidation and possible effusion, but her lack of fever or true leukocytosis is concerning for an alternate etiology. Will treat empirically for PNA and work up for alternate causes such as CHF. However CT shows 5cm c/f malignancy with extension to mediastinum, will discuss with pulm for possible outpatient v inpatient bronch w/ biopsy  - CTX1g  Q24H (4/14-4/18)  - Azithro 500mg daily 4/14-4/16  - TTE  - daily volume assessment

## 2022-04-15 NOTE — PT/OT/SLP EVAL
Speech Language Pathology Evaluation  Bedside Swallow  Discharge    Patient Name:  Luiza Childers   MRN:  [id number]   632/632 A    Admitting Diagnosis: Acute hypoxemic respiratory failure    Recommendations:                 General Recommendations:  Follow-up not indicated  Diet recommendations:  Regular, Thin   Aspiration Precautions: Standard aspiration precautions   General Precautions: Standard, fall  Communication strategies:  none    History:     Past Medical History:   Diagnosis Date    Diabetes mellitus, type 2     Hypertension     Syncope and collapse        Past Surgical History:   Procedure Laterality Date    CARDIAC PACEMAKER PLACEMENT      CATARACT EXTRACTION      COLONOSCOPY N/A 6/18/2020    Procedure: COLONOSCOPY;  Surgeon: Toro Burgos MD;  Location: [address]);  Service: Endoscopy;  Laterality: N/A;    ESOPHAGOGASTRODUODENOSCOPY N/A 6/18/2020    Procedure: EGD (ESOPHAGOGASTRODUODENOSCOPY);  Surgeon: Toro Burgos MD;  Location: [address]);  Service: Endoscopy;  Laterality: N/A;    HYSTERECTOMY       MD note 4/14: HPI: 88 y.o. female with Hx of PVD, DM, dementia, CKD3, presenting with several days of cough and SOB c/f PNA  Of note the patient was admitted in January with CAP and recovered well. Most of the history is provided by her son due to the patient's dementia.  3 days prior to admission patient started having a slight cough, this worsened over the next several days and was more pronounced at night, also relieved somewhat by sitting up. On the day prior to admission the patient's son also noted some work of breathing and FRANK. No fevers, no sick contacts. Of note the patient is not on lasix and was only briefly on lasix during a prior admission.  Because of the SOB her son decided to bring her to the emergency room. In the ED she was HTN to 164/71, HR 78, RR 16, satting low on RA (90%), her CXR was notable for a possible developing consolidation in the LLL as  "well as possible small L pleural effusion, concerning for aspiration or PNA.  Labs were notable for WBC 11 (previous was 5-6), baseline hgb and Cr 1.1 (baseline), and  (slightly above baseline). She was given azithro and ctx and admitted for CAP.    Chest X-Rays 4/14: New airspace disease versus consolidation in the left lung base with possible left pleural effusion.  Findings are likely related to pneumonia or aspiration.  Left perihilar opacity which may represent additional airspace disease though suggest continued attention on follow-up to exclude underlying mass.  Hiatal hernia.     Prior diet: reg/thin    Subjective     Patient awake and sitting up in chair upon SLP entry.   Patient goals: "I'm good."     Pain/Comfort:  · Pain Rating 1: 0/10    Respiratory Status: Room air    Objective:     Oral Musculature Evaluation  · Oral Musculature: WFL  · Dentition: upper dentures, uses dentures to eat  · Mucosal Quality: good  · Oral Labial Strength and Mobility: WFL  · Lingual Strength and Mobility: WFL  · Volitional Cough: elicited  · Volitional Swallow: timely  · Voice Prior to PO Intake: clear    Bedside Swallow Eval:   Consistencies Assessed:  · Thin liquid sips of water straw   · Solids bites of cracker      Oral Phase:   · WFL     Pharyngeal Phase:   · no overt clinical signs/symptoms of aspiration  · no overt clinical signs/symptoms of pharyngeal dysphagia     Compensatory Strategies  · None      Patient denies difficulty with regular breakfast and lunch trays this morning.     Treatment: SLP provided patient education on SLP role, s/s and risks of aspiraiton, safe swallow precautions, and POC. Patient v/u of all discussed. White board updated.     Assessment:     Luiza Childers is a 88 y.o. female with no overt s/s of aspiration with all trials. No further skilled acute Speech Therapy services warranted at this time. Please re-consult as needed.       Goals:   Multidisciplinary Problems     SLP Goals "     Not on file          Multidisciplinary Problems (Resolved)        Problem: SLP    Goal Priority Disciplines Outcome   SLP Goal   (Resolved)     SLP Met                   Plan:     · Plan of Care reviewed with:  patient   · SLP Follow-Up:  No       Discharge recommendations:   (no ST needs)   Barriers to Discharge:  None    Time Tracking:     SLP Treatment Date:   04/15/22  Speech Start Time:  1234  Speech Stop Time:  1244     Speech Total Time (min):  10 min    Billable Minutes: Eval Swallow and Oral Function 10    04/15/2022

## 2022-04-15 NOTE — SUBJECTIVE & OBJECTIVE
Interval History: NAEON    Review of Systems   Constitutional:  Negative for fatigue and fever.   Respiratory:  Positive for cough and shortness of breath.    Cardiovascular:  Negative for chest pain and leg swelling.   Gastrointestinal:  Negative for abdominal distention and abdominal pain.   Genitourinary:  Negative for dysuria and frequency.   Neurological:  Negative for dizziness, light-headedness and numbness.   Objective:     Vital Signs (Most Recent):  Temp: 97.3 °F (36.3 °C) (04/15/22 1505)  Pulse: 65 (04/15/22 1505)  Resp: 18 (04/15/22 1027)  BP: (!) 148/67 (04/15/22 1505)  SpO2: (!) 93 % (04/15/22 1505)   Vital Signs (24h Range):  Temp:  [96.9 °F (36.1 °C)-98.5 °F (36.9 °C)] 97.3 °F (36.3 °C)  Pulse:  [64-85] 65  Resp:  [15-20] 18  SpO2:  [92 %-96 %] 93 %  BP: (127-178)/(65-81) 148/67     Weight: 63.5 kg (140 lb 0.2 oz)  Body mass index is 27.34 kg/m².  No intake or output data in the 24 hours ending 04/15/22 1743   Physical Exam  Constitutional:       Appearance: Normal appearance.   Cardiovascular:      Rate and Rhythm: Normal rate and regular rhythm.      Pulses: Normal pulses.      Heart sounds: Normal heart sounds.   Pulmonary:      Effort: Pulmonary effort is normal. No respiratory distress.      Breath sounds: Normal breath sounds.   Skin:     Capillary Refill: Capillary refill takes less than 2 seconds.   Neurological:      General: No focal deficit present.      Mental Status: She is alert.      Comments: AAOx1   Psychiatric:         Mood and Affect: Mood normal.         Behavior: Behavior normal.       Significant Labs: All pertinent labs within the past 24 hours have been reviewed.  CBC:   Recent Labs   Lab 04/14/22  1241 04/15/22  0252   WBC 11.11 8.17   HGB 9.2* 8.4*   HCT 30.2* 27.1*    189     CMP:   Recent Labs   Lab 04/14/22  1214 04/15/22  0252    135*   K 4.4 4.0    102   CO2 26 25   * 164*   BUN 29* 23   CREATININE 1.1 1.0   CALCIUM 9.8 9.0   PROT 7.0  --     ALBUMIN 3.6  --    BILITOT 0.4  --    ALKPHOS 63  --    AST 17  --    ALT 19  --    ANIONGAP 9 8   EGFRNONAA 44.9* 50.4*       Significant Imaging: I have reviewed all pertinent imaging results/findings within the past 24 hours.

## 2022-04-15 NOTE — PROGRESS NOTES
[facility] Medicine  Progress Note    Patient Name: Luiza Childers  MRN: [id number]  Patient Class: IP- Inpatient   Admission Date: 4/14/2022  Length of Stay: 1 days  Attending Physician: Antony Chavez MD  Primary Care Provider: Isis iRchter DO        Subjective:     Principal Problem:Acute hypoxemic respiratory failure        HPI:  88 y.o. female with Hx of PVD, DM, dementia, CKD3, presenting with several days of cough and SOB c/f PNA    Of note the patient was admitted in January with CAP and recovered well. Most of the history is provided by her son due to the patient's dementia.    3 days prior to admission patient started having a slight cough, this worsened over the next several days and was more pronounced at night, also relieved somewhat by sitting up. On the day prior to admission the patient's son also noted some work of breathing and FRANK. No fevers, no sick contacts. Of note the patient is not on lasix and was only briefly on lasix during a prior admission.    Because of the SOB her son decided to bring her to the emergency room. In the ED she was HTN to 164/71, HR 78, RR 16, satting low on RA (90%), her CXR was notable for a possible developing consolidation in the LLL as well as possible small L pleural effusion, concerning for aspiration or PNA.    Labs were notable for WBC 11 (previous was 5-6), baseline hgb and Cr 1.1 (baseline), and  (slightly above baseline). She was given azithro and ctx and admitted for CAP.      Overview/Hospital Course:  Breathing stable, CT showed 5cm mass in LLL with extension to mediastinum c/f maligancy.      Interval History: NAEON    Review of Systems   Constitutional:  Negative for fatigue and fever.   Respiratory:  Positive for cough and shortness of breath.    Cardiovascular:  Negative for chest pain and leg swelling.   Gastrointestinal:  Negative for abdominal distention and abdominal pain.   Genitourinary:  Negative for dysuria and  frequency.   Neurological:  Negative for dizziness, light-headedness and numbness.   Objective:     Vital Signs (Most Recent):  Temp: 97.3 °F (36.3 °C) (04/15/22 1505)  Pulse: 65 (04/15/22 1505)  Resp: 18 (04/15/22 1027)  BP: (!) 148/67 (04/15/22 1505)  SpO2: (!) 93 % (04/15/22 1505)   Vital Signs (24h Range):  Temp:  [96.9 °F (36.1 °C)-98.5 °F (36.9 °C)] 97.3 °F (36.3 °C)  Pulse:  [64-85] 65  Resp:  [15-20] 18  SpO2:  [92 %-96 %] 93 %  BP: (127-178)/(65-81) 148/67     Weight: 63.5 kg (140 lb 0.2 oz)  Body mass index is 27.34 kg/m².  No intake or output data in the 24 hours ending 04/15/22 1743   Physical Exam  Constitutional:       Appearance: Normal appearance.   Cardiovascular:      Rate and Rhythm: Normal rate and regular rhythm.      Pulses: Normal pulses.      Heart sounds: Normal heart sounds.   Pulmonary:      Effort: Pulmonary effort is normal. No respiratory distress.      Breath sounds: Normal breath sounds.   Skin:     Capillary Refill: Capillary refill takes less than 2 seconds.   Neurological:      General: No focal deficit present.      Mental Status: She is alert.      Comments: AAOx1   Psychiatric:         Mood and Affect: Mood normal.         Behavior: Behavior normal.       Significant Labs: All pertinent labs within the past 24 hours have been reviewed.  CBC:   Recent Labs   Lab 04/14/22  1241 04/15/22  0252   WBC 11.11 8.17   HGB 9.2* 8.4*   HCT 30.2* 27.1*    189     CMP:   Recent Labs   Lab 04/14/22  1214 04/15/22  0252    135*   K 4.4 4.0    102   CO2 26 25   * 164*   BUN 29* 23   CREATININE 1.1 1.0   CALCIUM 9.8 9.0   PROT 7.0  --    ALBUMIN 3.6  --    BILITOT 0.4  --    ALKPHOS 63  --    AST 17  --    ALT 19  --    ANIONGAP 9 8   EGFRNONAA 44.9* 50.4*       Significant Imaging: I have reviewed all pertinent imaging results/findings within the past 24 hours.        Assessment/Plan:      * Acute hypoxemic respiratory failure  88 y.o. female with Hx of PVD, DM,  dementia, CKD3, presenting with several days of cough and SOB c/f PNA now with CT findings c/f lung cancer    Pneumonia (CAP)  Patient with Hypoxic Respiratory failure which is Acute.  she is not on home oxygen. Supplemental oxygen was provided and noted-  .   Signs/symptoms of respiratory failure include- tachypnea. The leading etiology is pneumonia at this time, however CHF is not ruled out given her elevated BNP. Her CXR showed L sided developing consolidation and possible effusion, but her lack of fever or true leukocytosis is concerning for an alternate etiology. Will treat empirically for PNA and work up for alternate causes such as CHF. However CT shows 5cm c/f malignancy with extension to mediastinum, will discuss with pulm for possible outpatient v inpatient bronch w/ biopsy  - CTX1g  Q24H (4/14-4/18)  - Azithro 500mg daily 4/14-4/16  - TTE  - daily volume assessment    Dementia with behavioral disturbance  Patient pleasant at baseline, but with notable dementia, unable to recall her symptoms. 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, without long-term current use of insulin, unspecified laterality  Controlled without medications per son  - DM diet, ISS      VTE Risk Mitigation (From admission, onward)         Ordered     enoxaparin injection 40 mg  Daily         04/15/22 0952     IP VTE HIGH RISK PATIENT  Once         04/14/22 1442     Place sequential compression device  Until discontinued         04/14/22 1442                Discharge Planning   NO: 4/16/2022     Code Status: DNR   Is the patient medically ready for discharge?: No    Reason for patient still in hospital (select all that apply): Patient trending condition and Treatment                      Antony Chavez MD  Department of Hospital Medicine   [facility]

## 2022-04-15 NOTE — PLAN OF CARE
Problem: SLP  Goal: SLP Goal  Outcome: Met  Bedside Swallow Study completed. Recommend: regular diet, thin liquids, standard aspiration precautions. No further skilled ST services warranted at this time. Please re-consult as needed.

## 2022-04-15 NOTE — PLAN OF CARE
Patient A&Ox1-2. VS stable during shift. Son was at the bedside at the beginning of the shift, he is a home health physical therapist so he is very involved in his mother's care. Hired family sitter came later in the shift to watch over the pt due to here hx of dementia with sundowning. Pt was not fond of her sitter and she repeatedly kicked her out of the room. On several occasions throughout the night pt woke up and became agitated and angry when she saw the sitter was in the room. Despite the negative behavior towards the sitter pt was very nice, calm and cooperative/redirectable with hospital staff. Plan of care and pt education were reviewed at the bedside. Pt verbalized understanding. Safety was maintained during shift.     Problem: Adult Inpatient Plan of Care  Goal: Plan of Care Review  Outcome: Ongoing, Progressing  Goal: Patient-Specific Goal (Individualized)  Outcome: Ongoing, Progressing  Goal: Absence of Hospital-Acquired Illness or Injury  Outcome: Ongoing, Progressing  Goal: Optimal Comfort and Wellbeing  Outcome: Ongoing, Progressing  Goal: Readiness for Transition of Care  Outcome: Ongoing, Progressing     Problem: Diabetes Comorbidity  Goal: Blood Glucose Level Within Targeted Range  Outcome: Ongoing, Progressing     Problem: Fluid Imbalance (Pneumonia)  Goal: Fluid Balance  Outcome: Ongoing, Progressing     Problem: Infection (Pneumonia)  Goal: Resolution of Infection Signs and Symptoms  Outcome: Ongoing, Progressing     Problem: Respiratory Compromise (Pneumonia)  Goal: Effective Oxygenation and Ventilation  Outcome: Ongoing, Progressing     Problem: Fall Injury Risk  Goal: Absence of Fall and Fall-Related Injury  Outcome: Ongoing, Progressing     Problem: Skin Injury Risk Increased  Goal: Skin Health and Integrity  Outcome: Ongoing, Progressing

## 2022-04-15 NOTE — PROGRESS NOTES
Pharmacist Renal Dose Adjustment Note    Luiza Childers is a 88 y.o. female being treated with the medication Enoxaparin.    Patient Data:    Vital Signs (Most Recent):  Temp: 97 °F (36.1 °C) (04/15/22 0412)  Pulse: 73 (04/15/22 0412)  Resp: 16 (04/15/22 0412)  BP: (!) 159/65 (04/15/22 0412)  SpO2: (!) 92 % (04/15/22 0412)   Vital Signs (72h Range):  Temp:  [96.9 °F (36.1 °C)-98.5 °F (36.9 °C)]   Pulse:  [66-78]   Resp:  [15-20]   BP: (144-178)/(65-81)   SpO2:  [90 %-98 %]      Recent Labs   Lab 04/14/22  1214 04/15/22  0252   CREATININE 1.1 1.0     Serum creatinine: 1 mg/dL 04/15/22 0252  Estimated creatinine clearance: 32.4 mL/min    Medication: Enoxaparin 30 mg SC daily will be changed to enoxaparin 40 mg SC daily.     Pharmacist's Name: Teodora Wallace PharmD  Pharmacist's Extension: [phone]

## 2022-04-15 NOTE — HOSPITAL COURSE
Breathing stable, CT showed 5cm mass in LLL with extension to mediastinum c/f maligancy. Pulm consulted but since she is not an ideal candidate for treatment they recommended palliative consultation. Palliative care discussed with patient's son and they agreed on home hospice.

## 2022-04-16 PROBLEM — J18.1 LOBAR PNEUMONIA, UNSPECIFIED ORGANISM: Status: ACTIVE | Noted: 2021-12-18

## 2022-04-16 PROBLEM — R91.8 HILAR MASS: Status: ACTIVE | Noted: 2022-04-16

## 2022-04-16 PROBLEM — Z51.5 PALLIATIVE CARE ENCOUNTER: Status: ACTIVE | Noted: 2022-04-16

## 2022-04-16 LAB
ANION GAP SERPL CALC-SCNC: 9 MMOL/L (ref 8–16)
BASOPHILS # BLD AUTO: 0.02 K/UL (ref 0–0.2)
BASOPHILS NFR BLD: 0.4 % (ref 0–1.9)
BUN SERPL-MCNC: 24 MG/DL (ref 8–23)
CALCIUM SERPL-MCNC: 9 MG/DL (ref 8.7–10.5)
CHLORIDE SERPL-SCNC: 105 MMOL/L (ref 95–110)
CO2 SERPL-SCNC: 25 MMOL/L (ref 23–29)
CREAT SERPL-MCNC: 1 MG/DL (ref 0.5–1.4)
DIFFERENTIAL METHOD: ABNORMAL
EOSINOPHIL # BLD AUTO: 0.2 K/UL (ref 0–0.5)
EOSINOPHIL NFR BLD: 4 % (ref 0–8)
ERYTHROCYTE [DISTWIDTH] IN BLOOD BY AUTOMATED COUNT: 22.1 % (ref 11.5–14.5)
EST. GFR  (AFRICAN AMERICAN): 58.1 ML/MIN/1.73 M^2
EST. GFR  (NON AFRICAN AMERICAN): 50.4 ML/MIN/1.73 M^2
GLUCOSE SERPL-MCNC: 123 MG/DL (ref 70–110)
HCT VFR BLD AUTO: 29.1 % (ref 37–48.5)
HGB BLD-MCNC: 9 G/DL (ref 12–16)
IMM GRANULOCYTES # BLD AUTO: 0.02 K/UL (ref 0–0.04)
IMM GRANULOCYTES NFR BLD AUTO: 0.4 % (ref 0–0.5)
LYMPHOCYTES # BLD AUTO: 1.2 K/UL (ref 1–4.8)
LYMPHOCYTES NFR BLD: 21.2 % (ref 18–48)
MCH RBC QN AUTO: 25.9 PG (ref 27–31)
MCHC RBC AUTO-ENTMCNC: 30.9 G/DL (ref 32–36)
MCV RBC AUTO: 84 FL (ref 82–98)
MONOCYTES # BLD AUTO: 0.5 K/UL (ref 0.3–1)
MONOCYTES NFR BLD: 9.3 % (ref 4–15)
NEUTROPHILS # BLD AUTO: 3.6 K/UL (ref 1.8–7.7)
NEUTROPHILS NFR BLD: 64.7 % (ref 38–73)
NRBC BLD-RTO: 0 /100 WBC
PLATELET # BLD AUTO: 188 K/UL (ref 150–450)
PMV BLD AUTO: 11.3 FL (ref 9.2–12.9)
POCT GLUCOSE: 147 MG/DL (ref 70–110)
POCT GLUCOSE: 159 MG/DL (ref 70–110)
POCT GLUCOSE: 172 MG/DL (ref 70–110)
POCT GLUCOSE: 219 MG/DL (ref 70–110)
POTASSIUM SERPL-SCNC: 3.8 MMOL/L (ref 3.5–5.1)
RBC # BLD AUTO: 3.48 M/UL (ref 4–5.4)
SODIUM SERPL-SCNC: 139 MMOL/L (ref 136–145)
WBC # BLD AUTO: 5.48 K/UL (ref 3.9–12.7)

## 2022-04-16 PROCEDURE — 85025 COMPLETE CBC W/AUTO DIFF WBC: CPT | Performed by: STUDENT IN AN ORGANIZED HEALTH CARE EDUCATION/TRAINING PROGRAM

## 2022-04-16 PROCEDURE — 99223 1ST HOSP IP/OBS HIGH 75: CPT | Mod: GC,,, | Performed by: INTERNAL MEDICINE

## 2022-04-16 PROCEDURE — 36415 COLL VENOUS BLD VENIPUNCTURE: CPT | Performed by: STUDENT IN AN ORGANIZED HEALTH CARE EDUCATION/TRAINING PROGRAM

## 2022-04-16 PROCEDURE — 99223 PR INITIAL HOSPITAL CARE,LEVL III: ICD-10-PCS | Mod: GC,,, | Performed by: INTERNAL MEDICINE

## 2022-04-16 PROCEDURE — 25000003 PHARM REV CODE 250: Performed by: HOSPITALIST

## 2022-04-16 PROCEDURE — 99223 PR INITIAL HOSPITAL CARE,LEVL III: ICD-10-PCS | Mod: ,,, | Performed by: INTERNAL MEDICINE

## 2022-04-16 PROCEDURE — 99232 PR SUBSEQUENT HOSPITAL CARE,LEVL II: ICD-10-PCS | Mod: ,,, | Performed by: STUDENT IN AN ORGANIZED HEALTH CARE EDUCATION/TRAINING PROGRAM

## 2022-04-16 PROCEDURE — 99223 1ST HOSP IP/OBS HIGH 75: CPT | Mod: ,,, | Performed by: INTERNAL MEDICINE

## 2022-04-16 PROCEDURE — 99497 PR ADVNCD CARE PLAN 30 MIN: ICD-10-PCS | Mod: 25,,, | Performed by: INTERNAL MEDICINE

## 2022-04-16 PROCEDURE — 99232 SBSQ HOSP IP/OBS MODERATE 35: CPT | Mod: ,,, | Performed by: STUDENT IN AN ORGANIZED HEALTH CARE EDUCATION/TRAINING PROGRAM

## 2022-04-16 PROCEDURE — 99497 ADVNCD CARE PLAN 30 MIN: CPT | Mod: 25,,, | Performed by: INTERNAL MEDICINE

## 2022-04-16 PROCEDURE — 63700000 PHARM REV CODE 250 ALT 637 W/O HCPCS: Performed by: STUDENT IN AN ORGANIZED HEALTH CARE EDUCATION/TRAINING PROGRAM

## 2022-04-16 PROCEDURE — 63600175 PHARM REV CODE 636 W HCPCS: Performed by: STUDENT IN AN ORGANIZED HEALTH CARE EDUCATION/TRAINING PROGRAM

## 2022-04-16 PROCEDURE — 80048 BASIC METABOLIC PNL TOTAL CA: CPT | Performed by: STUDENT IN AN ORGANIZED HEALTH CARE EDUCATION/TRAINING PROGRAM

## 2022-04-16 PROCEDURE — 11000001 HC ACUTE MED/SURG PRIVATE ROOM

## 2022-04-16 PROCEDURE — 25000003 PHARM REV CODE 250: Performed by: STUDENT IN AN ORGANIZED HEALTH CARE EDUCATION/TRAINING PROGRAM

## 2022-04-16 RX ORDER — LISINOPRIL 20 MG/1
20 TABLET ORAL 2 TIMES DAILY
Status: DISCONTINUED | OUTPATIENT
Start: 2022-04-16 | End: 2022-04-17 | Stop reason: HOSPADM

## 2022-04-16 RX ORDER — LISINOPRIL 10 MG/1
10 TABLET ORAL ONCE
Status: DISCONTINUED | OUTPATIENT
Start: 2022-04-16 | End: 2022-04-16

## 2022-04-16 RX ORDER — MORPHINE SULFATE ORAL SOLUTION 10 MG/5ML
5 SOLUTION ORAL
Qty: 100 ML | Refills: 0 | Status: SHIPPED | OUTPATIENT
Start: 2022-04-16 | End: 2022-04-17 | Stop reason: SDUPTHER

## 2022-04-16 RX ORDER — HYDRALAZINE HYDROCHLORIDE 25 MG/1
25 TABLET, FILM COATED ORAL EVERY 8 HOURS PRN
Status: DISCONTINUED | OUTPATIENT
Start: 2022-04-16 | End: 2022-04-17

## 2022-04-16 RX ORDER — CEFDINIR 300 MG/1
300 CAPSULE ORAL 2 TIMES DAILY
Qty: 8 CAPSULE | Refills: 0 | Status: SHIPPED | OUTPATIENT
Start: 2022-04-17 | End: 2022-04-21

## 2022-04-16 RX ADMIN — QUETIAPINE FUMARATE 50 MG: 25 TABLET ORAL at 08:04

## 2022-04-16 RX ADMIN — LISINOPRIL 10 MG: 10 TABLET ORAL at 10:04

## 2022-04-16 RX ADMIN — DOCUSATE SODIUM 100 MG: 100 CAPSULE, LIQUID FILLED ORAL at 10:04

## 2022-04-16 RX ADMIN — QUETIAPINE FUMARATE 25 MG: 25 TABLET ORAL at 05:04

## 2022-04-16 RX ADMIN — MIRTAZAPINE 15 MG: 15 TABLET, FILM COATED ORAL at 08:04

## 2022-04-16 RX ADMIN — CHOLECALCIFEROL TAB 125 MCG (5000 UNIT) 5000 UNITS: 125 TAB at 10:04

## 2022-04-16 RX ADMIN — CEFTRIAXONE 1 G: 1 INJECTION, SOLUTION INTRAVENOUS at 04:04

## 2022-04-16 RX ADMIN — ENOXAPARIN SODIUM 40 MG: 40 INJECTION SUBCUTANEOUS at 06:04

## 2022-04-16 RX ADMIN — LISINOPRIL 20 MG: 20 TABLET ORAL at 08:04

## 2022-04-16 RX ADMIN — AZITHROMYCIN MONOHYDRATE 500 MG: 250 TABLET ORAL at 10:04

## 2022-04-16 RX ADMIN — DOCUSATE SODIUM 100 MG: 100 CAPSULE, LIQUID FILLED ORAL at 08:04

## 2022-04-16 RX ADMIN — LISINOPRIL 10 MG: 10 TABLET ORAL at 12:04

## 2022-04-16 NOTE — CONSULTS
[facility] Med [facility]  Palliative Medicine  Consult Note    Patient Name: Luiza Childers  MRN: [id number]  Admission Date: 4/14/2022  Hospital Length of Stay: 2 days  Code Status: DNR   Attending Provider: Antony Chavez MD  Consulting Provider: Blanca Garcia MD  Primary Care Physician: Isis Richter DO  Principal Problem:Acute hypoxemic respiratory failure    Patient information was obtained from patient, relative(s), past medical records and primary team.      Inpatient consult to Palliative Care  Consult performed by: Blanca Garcia MD  Consult ordered by: Antony Chavez MD  Reason for consult: Goals of care and discuss hospice at discharge        Assessment/Plan:     Palliative care encounter  Impression:    1) Symptoms:  Cough and shortness of breath     2) Medicolegal: does not have decision making capacity.  Ketan Zendejas is surrogate decision maker.   Aashish is HCPOA.     3) Psychosocial:  support system consists of ketan Zendejas and christiana     4) Spiritual: Orthodox    5) Prognostication: anticipate life span to be less than 6 months due to presumed lung cancer.    6) Understanding of disease and Illness Trajectory: Ketan Zendejas  has  good understanding of her illness, they can benefit from continued education on what to expect in the future.      7) Goals of care:  Quality of life and care in the home  Advance Care Planning     Date: 04/16/2022  Ketan Zendejas would like to take her home with [facility]. He is her surrogate decision maker. She had previously expressed that she did not want life prolonging treatment.         8) Code status: DNR    9) Advance directives:none here.      Summary and recommendations:  Anticipate discharge tomorrow with [facility].  Would continue current meds.  Would use Morphine concentrate 20 mg/ml 0.5 ml every 1 hour as needed for shortness of breath.  Delirium/sundowning-may increase the quetiapine to extra 50 mg at night for now. May do better once home.  Assistive  devices per hospice and son.  I have completed and signed a LaPOST. Needs to be reviewed by son and signed by him. I can show you if needed.      >16 min time was spent on advance care planning, goals of care discussion, emotional support, formulating and communicating prognosis and goals of care, exploring burden/benefit of various approaches of treatment.                Thank you for your consult. I will follow-up with patient. Please contact us if you have any additional questions.    Subjective:     HPI:   88 y.o. female with Hx of PVD, DM, dementia, CKD3, presenting with several days of cough and SOB c/f PNA     Of note the patient was admitted in January with CAP and recovered well. Most of the history is provided by her son due to the patient's dementia.     3 days prior to admission patient started having a slight cough, this worsened over the next several days and was more pronounced at night, also relieved somewhat by sitting up. On the day prior to admission the patient's son also noted some work of breathing and FRANK. No fevers, no sick contacts. Of note the patient is not on lasix and was only briefly on lasix during a prior admission.     Because of the SOB her son decided to bring her to the emergency room. In the ED she was HTN to 164/71, HR 78, RR 16, satting low on RA (90%), her CXR was notable for a possible developing consolidation in the LLL as well as possible small L pleural effusion, concerning for aspiration or PNA.     Labs were notable for WBC 11 (previous was 5-6), baseline hgb and Cr 1.1 (baseline), and  (slightly above baseline). She was given azithro and ctx and admitted for CAP.     Was found to have a 5 cm mass in the Left lower lobe.   Palliative medicine was asked to see her to assist with goals of care and consideration of admission to home hospice.      Hospital Course:  No notes on file    Interval History: Patient is sitting in a wc with nelson Whalen. She has memory  impairment and requires assistance with ADL.  She is not short of breath and appears comfortable. Her son is a PT with [employer]. (Prior to her cognitive impairment, she was not interested in life prolonging therapy.) I spoke with her son by phone.     Past Medical History:   Diagnosis Date    Diabetes mellitus, type 2     Hypertension     Syncope and collapse        Past Surgical History:   Procedure Laterality Date    CARDIAC PACEMAKER PLACEMENT      CATARACT EXTRACTION      COLONOSCOPY N/A 2020    Procedure: COLONOSCOPY;  Surgeon: Toro Burgos MD;  Location: [facility] ([address] [place] [address]);  Service: Endoscopy;  Laterality: N/A;    ESOPHAGOGASTRODUODENOSCOPY N/A 2020    Procedure: EGD (ESOPHAGOGASTRODUODENOSCOPY);  Surgeon: Toro Burgos MD;  Location: [facility] ([address] [place] [address]);  Service: Endoscopy;  Laterality: N/A;    HYSTERECTOMY         Review of patient's allergies indicates:  No Known Allergies    Medications:  Continuous Infusions:  Scheduled Meds:   cefTRIAXone (ROCEPHIN) IVPB  1 g Intravenous Q24H    cholecalciferol (vitamin D3)  5,000 Units Oral Daily    docusate sodium  100 mg Oral BID    enoxaparin  40 mg Subcutaneous Daily    lisinopriL  20 mg Oral BID    mirtazapine  15 mg Oral QHS    QUEtiapine  50 mg Oral QHS     PRN Meds:acetaminophen, dextrose 10%, dextrose 10%, glucagon (human recombinant), glucose, glucose, hydrALAZINE, insulin aspart U-100, naloxone, QUEtiapine, sodium chloride 0.9%    Family History       Family history is unknown by patient.          Tobacco Use    Smoking status: Former Smoker     Years: 25.00     Quit date: 1974     Years since quittin.8    Smokeless tobacco: Never Used   Substance and Sexual Activity    Alcohol use: No     Alcohol/week: 0.0 standard drinks    Drug use: No    Sexual activity: Not Currently       Review of Systems   Unable to perform ROS: Dementia   Objective:     Vital Signs (Most Recent):  Temp: 98.3 °F (36.8 °C)  (04/16/22 1051)  Pulse: 88 (04/16/22 1255)  Resp: 18 (04/16/22 1001)  BP: (!) 161/88 (04/16/22 1051)  SpO2: 96 % (04/16/22 1255)   Vital Signs (24h Range):  Temp:  [94.5 °F (34.7 °C)-99.1 °F (37.3 °C)] 98.3 °F (36.8 °C)  Pulse:  [62-88] 88  Resp:  [16-18] 18  SpO2:  [93 %-97 %] 96 %  BP: (153-181)/(67-88) 161/88     Weight: 63.5 kg (140 lb 0.2 oz)  Body mass index is 27.34 kg/m².    Physical Exam  Vitals reviewed.   Constitutional:       Appearance: Normal appearance. She is well-developed.   HENT:      Head: Normocephalic and atraumatic.      Right Ear: External ear normal.      Left Ear: External ear normal.      Nose: Nose normal.      Mouth/Throat:      Mouth: Mucous membranes are moist.      Pharynx: Oropharynx is clear.   Eyes:      Conjunctiva/sclera: Conjunctivae normal.      Pupils: Pupils are equal, round, and reactive to light.   Cardiovascular:      Rate and Rhythm: Normal rate and regular rhythm.      Heart sounds: Murmur heard.      Comments: 3/6 systolic murmur consistent with MR.  Pulmonary:      Effort: Pulmonary effort is normal.      Breath sounds: Normal breath sounds.      Comments: Decreased breath sounds on the left.  Abdominal:      General: Bowel sounds are normal.      Palpations: Abdomen is soft.   Musculoskeletal:         General: Normal range of motion.      Cervical back: Normal range of motion and neck supple.   Skin:     General: Skin is warm and dry.   Neurological:      Mental Status: She is alert. She is disoriented.      Comments: Knows name and in the hospital. Says she has no children but her son Aashish cares for her.   Psychiatric:         Mood and Affect: Mood normal.         Speech: Speech normal.         Behavior: Behavior normal.       Review of Symptoms      Symptom Assessment (ESAS 0-10 Scale)  Pain:  0  Dyspnea:  0  Anxiety:  0  Nausea:  0  Depression:  0  Anorexia:  0  Fatigue:  0  Insomnia:  0  Restlessness:  0  Agitation:  0     CAM / Delirium:  Negative  Constipation:   Negative  Diarrhea:  Negative    Bowel Management Plan (BMP):  Yes      Pain Assessment:  OME in 24 hours:  0  Location(s):      Modified Janeth Scale:  0    Performance Status:  50    Living Arrangements:  Lives with family and Lives in home (has 24 hours sitters)    Psychosocial/Cultural: ; 2 children. Lives with son Aashish.      Spiritual:  F - Leelee and Belief:  Jainism  I - Importance:  Yes  C - Community:  Unknown  A - Address in Care:  Unknown      Advance Care Planning   Advance Directives:   Living Will: unknown.    LaPOST: No    Do Not Resuscitate Status: Yes    Medical Power of : Yes    Agent's Name:  Aashish Childers   Agent's Contact Number:  [phone]    Decision Making:  Patient answered questions, Family answered questions and Patient unable to communicate due to disease severity/cognitive impairment       Significant Labs: All pertinent labs within the past 24 hours have been reviewed.  CBC:   Recent Labs   Lab 04/16/22 0327   WBC 5.48   HGB 9.0*   HCT 29.1*   MCV 84        BMP:  Recent Labs   Lab 04/16/22 0327   *      K 3.8      CO2 25   BUN 24*   CREATININE 1.0   CALCIUM 9.0     LFT:  Lab Results   Component Value Date    AST 17 04/14/2022    ALKPHOS 63 04/14/2022    BILITOT 0.4 04/14/2022     Albumin:   Albumin   Date Value Ref Range Status   04/14/2022 3.6 3.5 - 5.2 g/dL Final     Protein:   Total Protein   Date Value Ref Range Status   04/14/2022 7.0 6.0 - 8.4 g/dL Final     Lactic acid:   Lab Results   Component Value Date    LACTATE 1.6 12/18/2021    LACTATE 0.9 06/16/2020       Significant Imaging: I have reviewed all pertinent imaging results/findings within the past 24 hours.      Blanca Garcia MD  Palliative Medicine  [facility]

## 2022-04-16 NOTE — SUBJECTIVE & OBJECTIVE
Interval History: NAEON    Review of Systems   Constitutional:  Negative for fatigue and fever.   Respiratory:  Positive for cough and shortness of breath.    Cardiovascular:  Negative for chest pain and leg swelling.   Gastrointestinal:  Negative for abdominal distention and abdominal pain.   Genitourinary:  Negative for dysuria and frequency.   Neurological:  Negative for dizziness, light-headedness and numbness.   Objective:     Vital Signs (Most Recent):  Temp: 98.3 °F (36.8 °C) (04/16/22 1051)  Pulse: 88 (04/16/22 1255)  Resp: 18 (04/16/22 1001)  BP: (!) 161/88 (04/16/22 1051)  SpO2: 96 % (04/16/22 1255)   Vital Signs (24h Range):  Temp:  [94.5 °F (34.7 °C)-99.1 °F (37.3 °C)] 98.3 °F (36.8 °C)  Pulse:  [62-88] 88  Resp:  [16-18] 18  SpO2:  [93 %-97 %] 96 %  BP: (153-181)/(67-88) 161/88     Weight: 63.5 kg (140 lb 0.2 oz)  Body mass index is 27.34 kg/m².  No intake or output data in the 24 hours ending 04/16/22 1616   Physical Exam  Constitutional:       Appearance: Normal appearance.   Cardiovascular:      Rate and Rhythm: Normal rate and regular rhythm.      Pulses: Normal pulses.      Heart sounds: Normal heart sounds.   Pulmonary:      Effort: Pulmonary effort is normal. No respiratory distress.      Breath sounds: Normal breath sounds.   Skin:     Capillary Refill: Capillary refill takes less than 2 seconds.   Neurological:      General: No focal deficit present.      Mental Status: She is alert.      Comments: AAOx1   Psychiatric:         Mood and Affect: Mood normal.         Behavior: Behavior normal.       Significant Labs: All pertinent labs within the past 24 hours have been reviewed.  CBC:   Recent Labs   Lab 04/15/22  0252 04/16/22  0327   WBC 8.17 5.48   HGB 8.4* 9.0*   HCT 27.1* 29.1*    188       CMP:   Recent Labs   Lab 04/15/22  0252 04/16/22  0327   * 139   K 4.0 3.8    105   CO2 25 25   * 123*   BUN 23 24*   CREATININE 1.0 1.0   CALCIUM 9.0 9.0   ANIONGAP 8 9    EGFRNONAA 50.4* 50.4*         Significant Imaging: I have reviewed all pertinent imaging results/findings within the past 24 hours.

## 2022-04-16 NOTE — NURSING
6 mwt completed...highest SpO2-98%, lowest 90% which occurred towards the end of the 6 mins. No apparent respiratory distress. Pt denies discomfort at time of assessment.

## 2022-04-16 NOTE — DISCHARGE INSTRUCTIONS
Mrs. Childers was admitted for suspected pneumonia, she was treated for a pneumonia and her breathing and oxygenation improved, she will required 4 more days of antibiotic therapy.    However, a CT scan showed a 5cm mass in her left lower lung that is very likely cancer. Because of her age and other medical conditions we discussed with the lung specialists that a biopsy would not be in her best interest or within her goals of care. After discussions with palliative medicine we recommended hospice as an option and the hospice agency will be by on 4/18 to meet you.

## 2022-04-16 NOTE — HPI
88 y.o. female with Hx of PVD, DM, dementia, CKD3, presenting with several days of cough and SOB c/f PNA     Of note the patient was admitted in January with CAP and recovered well. Most of the history is provided by her son due to the patient's dementia.     3 days prior to admission patient started having a slight cough, this worsened over the next several days and was more pronounced at night, also relieved somewhat by sitting up. On the day prior to admission the patient's son also noted some work of breathing and FRANK. No fevers, no sick contacts. Of note the patient is not on lasix and was only briefly on lasix during a prior admission.     Because of the SOB her son decided to bring her to the emergency room. In the ED she was HTN to 164/71, HR 78, RR 16, satting low on RA (90%), her CXR was notable for a possible developing consolidation in the LLL as well as possible small L pleural effusion, concerning for aspiration or PNA.     Labs were notable for WBC 11 (previous was 5-6), baseline hgb and Cr 1.1 (baseline), and  (slightly above baseline). She was given azithro and ctx and admitted for CAP.     Was found to have a 5 cm mass in the Left lower lobe.   Palliative medicine was asked to see her to assist with goals of care and consideration of admission to home hospice.

## 2022-04-16 NOTE — SUBJECTIVE & OBJECTIVE
Interval History: Patient is sitting in a wc with nelson Marlee. She has memory impairment and requires assistance with ADL.  She is not short of breath and appears comfortable. Her son is a PT with [facility]. (Prior to her cognitive impairment, she was not interested in life prolonging therapy.) I spoke with her son by phone.     Past Medical History:   Diagnosis Date    Diabetes mellitus, type 2     Hypertension     Syncope and collapse        Past Surgical History:   Procedure Laterality Date    CARDIAC PACEMAKER PLACEMENT      CATARACT EXTRACTION      COLONOSCOPY N/A 2020    Procedure: COLONOSCOPY;  Surgeon: Toro Burgos MD;  Location: [facility] ([address] [place] [address]);  Service: Endoscopy;  Laterality: N/A;    ESOPHAGOGASTRODUODENOSCOPY N/A 2020    Procedure: EGD (ESOPHAGOGASTRODUODENOSCOPY);  Surgeon: Toro Burgos MD;  Location: [address]);  Service: Endoscopy;  Laterality: N/A;    HYSTERECTOMY         Review of patient's allergies indicates:  No Known Allergies    Medications:  Continuous Infusions:  Scheduled Meds:   cefTRIAXone (ROCEPHIN) IVPB  1 g Intravenous Q24H    cholecalciferol (vitamin D3)  5,000 Units Oral Daily    docusate sodium  100 mg Oral BID    enoxaparin  40 mg Subcutaneous Daily    lisinopriL  20 mg Oral BID    mirtazapine  15 mg Oral QHS    QUEtiapine  50 mg Oral QHS     PRN Meds:acetaminophen, dextrose 10%, dextrose 10%, glucagon (human recombinant), glucose, glucose, hydrALAZINE, insulin aspart U-100, naloxone, QUEtiapine, sodium chloride 0.9%    Family History       Family history is unknown by patient.          Tobacco Use    Smoking status: Former Smoker     Years: 25.00     Quit date: 1974     Years since quittin.8    Smokeless tobacco: Never Used   Substance and Sexual Activity    Alcohol use: No     Alcohol/week: 0.0 standard drinks    Drug use: No    Sexual activity: Not Currently       Review of Systems   Unable to perform ROS: Dementia    Objective:     Vital Signs (Most Recent):  Temp: 98.3 °F (36.8 °C) (04/16/22 1051)  Pulse: 88 (04/16/22 1255)  Resp: 18 (04/16/22 1001)  BP: (!) 161/88 (04/16/22 1051)  SpO2: 96 % (04/16/22 1255)   Vital Signs (24h Range):  Temp:  [94.5 °F (34.7 °C)-99.1 °F (37.3 °C)] 98.3 °F (36.8 °C)  Pulse:  [62-88] 88  Resp:  [16-18] 18  SpO2:  [93 %-97 %] 96 %  BP: (153-181)/(67-88) 161/88     Weight: 63.5 kg (140 lb 0.2 oz)  Body mass index is 27.34 kg/m².    Physical Exam  Vitals reviewed.   Constitutional:       Appearance: Normal appearance. She is well-developed.   HENT:      Head: Normocephalic and atraumatic.      Right Ear: External ear normal.      Left Ear: External ear normal.      Nose: Nose normal.      Mouth/Throat:      Mouth: Mucous membranes are moist.      Pharynx: Oropharynx is clear.   Eyes:      Conjunctiva/sclera: Conjunctivae normal.      Pupils: Pupils are equal, round, and reactive to light.   Cardiovascular:      Rate and Rhythm: Normal rate and regular rhythm.      Heart sounds: Murmur heard.      Comments: 3/6 systolic murmur consistent with MR.  Pulmonary:      Effort: Pulmonary effort is normal.      Breath sounds: Normal breath sounds.      Comments: Decreased breath sounds on the left.  Abdominal:      General: Bowel sounds are normal.      Palpations: Abdomen is soft.   Musculoskeletal:         General: Normal range of motion.      Cervical back: Normal range of motion and neck supple.   Skin:     General: Skin is warm and dry.   Neurological:      Mental Status: She is alert. She is disoriented.      Comments: Knows name and in the hospital. Says she has no children but her son Aashish cares for her.   Psychiatric:         Mood and Affect: Mood normal.         Speech: Speech normal.         Behavior: Behavior normal.       Review of Symptoms      Symptom Assessment (ESAS 0-10 Scale)  Pain:  0  Dyspnea:  0  Anxiety:  0  Nausea:  0  Depression:  0  Anorexia:  0  Fatigue:  0  Insomnia:   0  Restlessness:  0  Agitation:  0     CAM / Delirium:  Negative  Constipation:  Negative  Diarrhea:  Negative    Bowel Management Plan (BMP):  Yes      Pain Assessment:  OME in 24 hours:  0  Location(s):      Modified Janeth Scale:  0    Performance Status:  50    Living Arrangements:  Lives with family and Lives in home (has 24 hours sitters)    Psychosocial/Cultural: ; 2 children. Lives with son Aashish.      Spiritual:  F - Leelee and Belief:  [employer]  I - Importance:  Yes  C - Community:  Unknown  A - Address in Care:  Unknown      Advance Care Planning   Advance Directives:   Living Will: unknown.    LaPOST: No    Do Not Resuscitate Status: Yes    Medical Power of : Yes    Agent's Name:  Aashish Childers   Agent's Contact Number:  [phone]    Decision Making:  Patient answered questions, Family answered questions and Patient unable to communicate due to disease severity/cognitive impairment       Significant Labs: All pertinent labs within the past 24 hours have been reviewed.  CBC:   Recent Labs   Lab 04/16/22 0327   WBC 5.48   HGB 9.0*   HCT 29.1*   MCV 84        BMP:  Recent Labs   Lab 04/16/22 0327   *      K 3.8      CO2 25   BUN 24*   CREATININE 1.0   CALCIUM 9.0     LFT:  Lab Results   Component Value Date    AST 17 04/14/2022    ALKPHOS 63 04/14/2022    BILITOT 0.4 04/14/2022     Albumin:   Albumin   Date Value Ref Range Status   04/14/2022 3.6 3.5 - 5.2 g/dL Final     Protein:   Total Protein   Date Value Ref Range Status   04/14/2022 7.0 6.0 - 8.4 g/dL Final     Lactic acid:   Lab Results   Component Value Date    LACTATE 1.6 12/18/2021    LACTATE 0.9 06/16/2020       Significant Imaging: I have reviewed all pertinent imaging results/findings within the past 24 hours.

## 2022-04-16 NOTE — HPI
88 year old with a history significant for dementia with ongoing cognitive decline, Iron def anemia and HTN.  She is a former smoker who quit in her 50's unable to quantify the pack year history, worked as an .  She lives with her son, Aashish, who is a PT for [facility] in [place].  Over the past year, she has been hospitalized four times.    March 2021:  Symptomatic iron deficiency anemia.  Iron infusions deferred due to difficulty transporting to doctor visits with dementia.  September 2021:  Nondisplaced femur fracture at [facility].  December 2021:  RLL pneumonia with a left perihilar opacity seen next to cardiac pacer device and current admission.  She sees her PCP, Dr. Richter, virtually.      Son states that she was in her usual state of health a week ago and was ambulating more than she had in a while (1200 feet).  Prior to admit, she was having a cough mostly at night.  The day prior to and of admit, cough became worse and was associated with tachypnea.  She only complains of right sided back pain to her son.  Currently, she has no complaints.  Son reports that she is much improved since admit.  He denies that his mother has a chronic cough with meals.    Admitted with left lower lobe lobar pneumonia.  Perihilar mass was noted on CXR and CT was suggested.  CT confirmed a left perihilar solid opacity measuring 6 cm, LLL infiltrates and a small L effusion.  Antibiotics were initiated and she has clinically improved.

## 2022-04-16 NOTE — ASSESSMENT & PLAN NOTE
Patient with Hypoxic Respiratory failure which is Acute.  she is not on home oxygen. Supplemental oxygen was provided and noted-  .   Signs/symptoms of respiratory failure include- tachypnea  And cough.   Contributing diagnoses includes - Pneumonia and underlying mass Labs and images were reviewed. Patient Has not had a recent ABG. Will treat underlying causes and adjust management of respiratory failure as follows- continue treatment for pneumonia as she has clinically improved and is now Saturating fine on room air.

## 2022-04-16 NOTE — PROGRESS NOTES
[facility] Medicine  Progress Note    Patient Name: Luiza Childers  MRN: [id number]  Patient Class: IP- Inpatient   Admission Date: 4/14/2022  Length of Stay: 2 days  Attending Physician: Antony Chavez MD  Primary Care Provider: Isis Richter DO        Subjective:     Principal Problem:Acute hypoxemic respiratory failure        HPI:  88 y.o. female with Hx of PVD, DM, dementia, CKD3, presenting with several days of cough and SOB c/f PNA    Of note the patient was admitted in January with CAP and recovered well. Most of the history is provided by her son due to the patient's dementia.    3 days prior to admission patient started having a slight cough, this worsened over the next several days and was more pronounced at night, also relieved somewhat by sitting up. On the day prior to admission the patient's son also noted some work of breathing and FRANK. No fevers, no sick contacts. Of note the patient is not on lasix and was only briefly on lasix during a prior admission.    Because of the SOB her son decided to bring her to the emergency room. In the ED she was HTN to 164/71, HR 78, RR 16, satting low on RA (90%), her CXR was notable for a possible developing consolidation in the LLL as well as possible small L pleural effusion, concerning for aspiration or PNA.    Labs were notable for WBC 11 (previous was 5-6), baseline hgb and Cr 1.1 (baseline), and  (slightly above baseline). She was given azithro and ctx and admitted for CAP.      Overview/Hospital Course:  Breathing stable, CT showed 5cm mass in LLL with extension to mediastinum c/f maligancy. Pulm consulted but since she is not an ideal candidate for treatment they recommended palliative consultation. Palliative care discussed with patient's son and they agreed on home hospice.      Interval History: NAEON    Review of Systems   Constitutional:  Negative for fatigue and fever.   Respiratory:  Positive for cough and shortness of  breath.    Cardiovascular:  Negative for chest pain and leg swelling.   Gastrointestinal:  Negative for abdominal distention and abdominal pain.   Genitourinary:  Negative for dysuria and frequency.   Neurological:  Negative for dizziness, light-headedness and numbness.   Objective:     Vital Signs (Most Recent):  Temp: 98.3 °F (36.8 °C) (04/16/22 1051)  Pulse: 88 (04/16/22 1255)  Resp: 18 (04/16/22 1001)  BP: (!) 161/88 (04/16/22 1051)  SpO2: 96 % (04/16/22 1255)   Vital Signs (24h Range):  Temp:  [94.5 °F (34.7 °C)-99.1 °F (37.3 °C)] 98.3 °F (36.8 °C)  Pulse:  [62-88] 88  Resp:  [16-18] 18  SpO2:  [93 %-97 %] 96 %  BP: (153-181)/(67-88) 161/88     Weight: 63.5 kg (140 lb 0.2 oz)  Body mass index is 27.34 kg/m².  No intake or output data in the 24 hours ending 04/16/22 1616   Physical Exam  Constitutional:       Appearance: Normal appearance.   Cardiovascular:      Rate and Rhythm: Normal rate and regular rhythm.      Pulses: Normal pulses.      Heart sounds: Normal heart sounds.   Pulmonary:      Effort: Pulmonary effort is normal. No respiratory distress.      Breath sounds: Normal breath sounds.   Skin:     Capillary Refill: Capillary refill takes less than 2 seconds.   Neurological:      General: No focal deficit present.      Mental Status: She is alert.      Comments: AAOx1   Psychiatric:         Mood and Affect: Mood normal.         Behavior: Behavior normal.       Significant Labs: All pertinent labs within the past 24 hours have been reviewed.  CBC:   Recent Labs   Lab 04/15/22  0252 04/16/22  0327   WBC 8.17 5.48   HGB 8.4* 9.0*   HCT 27.1* 29.1*    188       CMP:   Recent Labs   Lab 04/15/22  0252 04/16/22  0327   * 139   K 4.0 3.8    105   CO2 25 25   * 123*   BUN 23 24*   CREATININE 1.0 1.0   CALCIUM 9.0 9.0   ANIONGAP 8 9   EGFRNONAA 50.4* 50.4*         Significant Imaging: I have reviewed all pertinent imaging results/findings within the past 24  hours.        Assessment/Plan:      * Acute hypoxemic respiratory failure  88 y.o. female with Hx of PVD, DM, dementia, CKD3, presenting with several days of cough and SOB c/f PNA now with CT findings c/f lung cancer    Pneumonia (CAP)  L lung mass c/f lung cancer  Patient with Hypoxic Respiratory failure which is Acute.  she is not on home oxygen. Supplemental oxygen was provided and noted-  .   Signs/symptoms of respiratory failure include- tachypnea. The leading etiology is pneumonia at this time, however CHF is not ruled out given her elevated BNP. Her CXR showed L sided developing consolidation and possible effusion, but her lack of fever or true leukocytosis is concerning for an alternate etiology. Will treat empirically for PNA and work up for alternate causes such as CHF. However CT shows 5cm c/f malignancy with extension to mediastinum, discussed with pulm for possible outpatient v inpatient bronch w/ biopsy. But given her functional status and dementia they recommended palliative care consultation. Plan for home hospice  - CTX1g  Q24H (4/14-4/18)  - Azithro 500mg daily 4/14-4/16  - Pulm consulted, appreciate recs  - Palliative consulted, appreciate recs  - SW helping arrange home hospice    Dementia with behavioral disturbance  Patient pleasant at baseline, but with notable dementia, unable to recall her symptoms. 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, without long-term current use of insulin, unspecified laterality  Controlled without medications per son  - DM diet, ISS      VTE Risk Mitigation (From admission, onward)         Ordered     enoxaparin injection 40 mg  Daily         04/15/22 0952     IP VTE HIGH RISK PATIENT  Once         04/14/22  1442     Place sequential compression device  Until discontinued         04/14/22 1442                Discharge Planning   NO: 4/17/2022     Code Status: DNR   Is the patient medically ready for discharge?: Yes    Reason for patient still in hospital (select all that apply): Patient trending condition and Treatment                     Antony Chavez MD  Department of Hospital Medicine   [facility]

## 2022-04-16 NOTE — CONSULTS
[facility]  Pulmonology  Consult Note    Patient Name: Luiza Childers  MRN: [id number]  Admission Date: 4/14/2022  Hospital Length of Stay: 2 days  Code Status: DNR  Attending Physician: Antony Chavez MD  Primary Care Provider: Isis Richter DO   Principal Problem: Acute hypoxemic respiratory failure    Inpatient consult to Pulmonology  Consult performed by: Blanca Langford MD  Consult ordered by: Antony Chavez MD  Reason for consult: Lung mass  Assessment/Recommendations: Please see assessment and plan.        Subjective:     HPI:  88 year old with a history significant for dementia with ongoing cognitive decline, Iron def anemia and HTN.  She is a former smoker who quit in her 50's unable to quantify the pack year history, worked as an .  She lives with her son who is a PT for [facility] in [place].  Over the past year, she has been hospitalized four times.    March 2021:  Symptomatic iron deficiency anemia.  Iron infusions deferred due to difficulty transporting to doctor visits with dementia.  September 2021:  Nondisplaced femur fracture at [facility].  December 2021:  RLL pneumonia with a left perihilar opacity seen next to cardiac pacer device and current admission.  She sees her PCP, angelo Sherman.      Son states that she was in her usual state of health a week ago and was ambulating more than she had in a while (1200 feet).  Prior to admit, she was having a cough mostly at night.  The day prior to and of admit, cough became worse and was associated with tachypnea.  She only complains of right sided back pain to her son.  Currently, she has no complaints.  Son reports that she is much improved since admit.  He denies that his mother has a chronic cough with meals.    Admitted with left lower lobe lobar pneumonia.  Perihilar mass was noted on CXR and CT was suggested.  CT confirmed a left perihilar solid opacity measuring 6 cm, LLL infiltrates and a small L effusion.   Antibiotics were initiated and she has clinically improved.       Past Medical History:   Diagnosis Date    Diabetes mellitus, type 2     Hypertension     Syncope and collapse        Past Surgical History:   Procedure Laterality Date    CARDIAC PACEMAKER PLACEMENT      CATARACT EXTRACTION      COLONOSCOPY N/A 2020    Procedure: COLONOSCOPY;  Surgeon: Toro Burgos MD;  Location: [address]);  Service: Endoscopy;  Laterality: N/A;    ESOPHAGOGASTRODUODENOSCOPY N/A 2020    Procedure: EGD (ESOPHAGOGASTRODUODENOSCOPY);  Surgeon: Toro Burgos MD;  Location: [address]);  Service: Endoscopy;  Laterality: N/A;    HYSTERECTOMY         Review of patient's allergies indicates:  No Known Allergies    Family History       Family history is unknown by patient.          Tobacco Use    Smoking status: Former Smoker     Years: 25.00     Quit date: 1974     Years since quittin.8    Smokeless tobacco: Never Used   Substance and Sexual Activity    Alcohol use: No     Alcohol/week: 0.0 standard drinks    Drug use: No    Sexual activity: Not Currently         Review of Systems   Reason unable to perform ROS: dementia.   Psychiatric/Behavioral:  Positive for agitation.    Objective:     Vital Signs (Most Recent):  Temp: 98.3 °F (36.8 °C) (22 1051)  Pulse: 64 (22 1051)  Resp: 18 (22 1001)  BP: (!) 161/88 (22 1051)  SpO2: 97 % (22 1051)   Vital Signs (24h Range):  Temp:  [94.5 °F (34.7 °C)-99.1 °F (37.3 °C)] 98.3 °F (36.8 °C)  Pulse:  [62-83] 64  Resp:  [16-18] 18  SpO2:  [93 %-97 %] 97 %  BP: (148-181)/(67-88) 161/88     Weight: 63.5 kg (140 lb 0.2 oz)  Body mass index is 27.34 kg/m².    No intake or output data in the 24 hours ending 22 1208    Physical Exam  Constitutional:       Appearance: Normal appearance.      Comments: Pleasant   HENT:      Head: Normocephalic and atraumatic.      Nose: Nose normal.      Mouth/Throat:      Mouth: Mucous  membranes are dry.   Pulmonary:      Effort: Pulmonary effort is normal.      Breath sounds: Normal breath sounds.      Comments: Diminished BS at left lung base  Musculoskeletal:         General: No swelling or tenderness. Normal range of motion.   Skin:     General: Skin is warm and dry.   Neurological:      General: No focal deficit present.      Mental Status: She is alert.      Comments: Oriented to person   Psychiatric:         Mood and Affect: Mood normal.         Behavior: Behavior normal.       Vents:       Lines/Drains/Airways       Peripheral Intravenous Line  Duration                  Peripheral IV - Single Lumen 04/15/22 1654 20 G Anterior;Right Forearm <1 day                    Significant Labs:  Procalcitonin is normal    CBC/Anemia Profile:  Recent Labs   Lab 04/14/22  1241 04/15/22  0252 04/16/22  0327   WBC 11.11 8.17 5.48   HGB 9.2* 8.4* 9.0*   HCT 30.2* 27.1* 29.1*    189 188   MCV 85 84 84   RDW 22.1* 22.3* 22.1*        Chemistries:  Recent Labs   Lab 04/14/22  1214 04/15/22  0252 04/16/22  0327    135* 139   K 4.4 4.0 3.8    102 105   CO2 26 25 25   BUN 29* 23 24*   CREATININE 1.1 1.0 1.0   CALCIUM 9.8 9.0 9.0   ALBUMIN 3.6  --   --    PROT 7.0  --   --    BILITOT 0.4  --   --    ALKPHOS 63  --   --    ALT 19  --   --    AST 17  --   --        All pertinent labs within the past 24 hours have been reviewed.    Significant Imaging:   I have reviewed all pertinent imaging results/findings within the past 24 hours.    Personally reviewed CXR with son.  CXR 12/2021 with left perihilar opacity that is larger and more conspicuous on this admits CXR.      CT of chest with solid 6 cm perihilar mass with narrowing of the LLL airway.  GGO and infiltrates with air bronchograms at the base likely represents post obstructive process.  Small left pleural effusion.     Assessment/Plan:     * Acute hypoxemic respiratory failure  Patient with Hypoxic Respiratory failure which is Acute.  she is  not on home oxygen. Supplemental oxygen was provided and noted-  .   Signs/symptoms of respiratory failure include- tachypnea  And cough.   Contributing diagnoses includes - Pneumonia and underlying mass Labs and images were reviewed. Patient Has not had a recent ABG. Will treat underlying causes and adjust management of respiratory failure as follows- continue treatment for pneumonia as she has clinically improved and is now Saturating fine on room air.        Hilar mass  CXR 12/2021 with left perihilar opacity that is larger and more conspicuous on current imaging 4/2022.    CT of chest confirms a solid 6 cm perihilar mass with narrowing of the LLL airway.  GGO and infiltrates with air bronchograms at the base likely represents post obstructive process.  Small left pleural effusion.     Patient is a former smoker with an enlarging mass.  This is certainly concerning for and most likely represents malignancy.    Patient has clinically improved with treatment for post obstructive pneumonia.    Son understands that this enlarging mass likely represents lung cancer.  He does not wish to pursue a diagnosis at this time as she is not an ideal candidate for treatment.  In addition, he stated that these were Mrs. Childers's  wishes before she developed dementia related cognitive decline.      Palliative care consult to pursue comfort measures.     Lobar pneumonia, unspecified organism  Cough and dyspnea improved.  Likely from post obstructive process from left perihilar mass.   Continue current management.          Thank you for your consult. I will sign off. Please contact us if you have any additional questions.     I have provided Aashish with my card and contact information should he have additional questions.      Blanca Langford MD  Pulmonology  [facility]

## 2022-04-16 NOTE — PLAN OF CARE
[facility]  Department of Hospital Medicine  [address] [place] [address]  [phone] after hours  [phone] fax    HOSPICE  ORDERS    04/16/2022    Admit to Hospice:  Home Service    Diagnoses:   Active Hospital Problems    Diagnosis  POA    *Acute hypoxemic respiratory failure [J96.01]  Yes    Hilar mass [R91.8]  Yes     CXR 12/2021 with left perihilar opacity that is larger and more conspicuous on current imaging 4/2022.    CT of chest confirms a solid 6 cm perihilar mass with narrowing of the LLL airway.  GGO and infiltrates with air bronchograms at the base likely represents post obstructive process.  Small left pleural effusion.     Patient is a former smoker with an enlarging mass.  This is certainly concerning for and most likely represents malignancy.    Patient has clinically improved with treatment for post obstructive pneumonia.    Son understands that this enlarging mass likely represents lung cancer.  He does not wish to pursue a diagnosis at this time as she is not an ideal candidate for treatment.  In addition, he stated that these were Mrs. Childers's  wishes before she developed dementia related cognitive decline.      Palliative care consult to pursue comfort measures.      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       Hospice Qualifying Diagnoses:        Patient has a life expectancy < 6 months due to:  1) Primary Hospice Diagnosis: Lung cancer  2) Dementia    Vital Signs: Routine per Hospice Protocol.    Code Status: DNR    Allergies: Review of patient's allergies indicates:  No Known Allergies    Diet: Regular    Activities: As  tolerated    Nursing: Per Hospice Routine    Oxygen: PRN    Medications:        Medication List      START taking these medications    cefdinir 300 MG capsule  Commonly known as: OMNICEF  Take 1 capsule (300 mg total) by mouth 2 (two) times daily. for 4 days  Start taking on: April 17, 2022     morphine 20 mg/5 mL (4 mg/mL) solution  Take 1.3 mLs (5.2 mg total) by mouth every 2 (two) hours as needed (Dyspnea).        CONTINUE taking these medications    alpha lipoic acid 300 mg Cap  Take by mouth.     * blood sugar diagnostic Strp  Commonly known as: ACCU-CHEK GERMÁN PLUS TEST STRP  USE ONE STRIP TO TEST DAILY.     * blood sugar diagnostic Strp  Commonly known as: ACCU-CHEK GERMÁN PLUS TEST STRP  USE ONE STRIP TO TEST DAILY.     cholecalciferol (vitamin D3) 125 mcg (5,000 unit) Tab  Take 5,000 Units by mouth once daily.     co-enzyme Q-10 30 mg capsule  Take 100 mg by mouth once daily.     COLACE ORAL  Take by mouth once daily.     cranberry 400 mg Cap  Take by mouth.     diclofenac sodium 1 % Gel  Commonly known as: VOLTAREN  Apply 2 g topically once daily.     ferrous sulfate 325 mg (65 mg iron) Tab tablet  Commonly known as: FEOSOL  Take 1 tablet (325 mg total) by mouth daily with breakfast.     hydrOXYzine HCL 25 MG tablet  Commonly known as: ATARAX  Take 1 tablet (25 mg total) by mouth every evening.     * lancets Misc  Check glucose daily     * lancets 33 gauge Misc  Commonly known as: ONETOUCH DELICA LANCETS  Check fasting glucose daily     LIDOcaine 5 %  Commonly known as: LIDODERM  Place 1 patch onto the skin once daily. Remove & Discard patch within 12 hours or as directed by MD     lisinopriL 10 MG tablet  Take 1 tablet (10 mg total) by mouth 2 (two) times daily.     mirtazapine 15 MG tablet  Commonly known as: REMERON  Take 1 tablet (15 mg total) by mouth every evening.     OCUVITE PRESERVISION ORAL  Take by mouth.     pantoprazole 40 MG tablet  Commonly known as: PROTONIX  Take 1 tablet (40 mg total) by  mouth once daily.     QUEtiapine 25 MG Tab  Commonly known as: SEROQUEL  Take 1 tablet (25 mg total) by mouth 2 (two) times daily as needed (agitation). Take 1/2 tablet po nightly prn insomnia     senna 8.6 mg tablet  Commonly known as: SENOKOT  Take 1 tablet by mouth daily as needed for Constipation.     TURMERIC ORAL  Take by mouth.         * This list has 4 medication(s) that are the same as other medications prescribed for you. Read the directions carefully, and ask your doctor or other care provider to review them with you.            STOP taking these medications    furosemide 20 MG tablet  Commonly known as: LASIX              DIABETES CARE: N/A      Future Orders:  Hospice Medical Director may dictate new orders for comfortable care measures & sign death certificate.        _________________________________  Antony Chavez MD  04/16/2022

## 2022-04-16 NOTE — HOSPITAL COURSE
Admitted with left lower lobe lobar pneumonia.  Perihilar mass was noted on CXR and CT was suggested.  CT confirmed a left perihilar solid opacity measuring 6 cm, LLL infiltrates and a small L effusion.  Antibiotics were initiated and she has clinically improved.

## 2022-04-16 NOTE — ASSESSMENT & PLAN NOTE
Impression:    1) Symptoms:  Cough and shortness of breath     2) Medicolegal: does not have decision making capacity.  Ketan Zendejas is surrogate decision maker.   Aashish is HCPOA.     3) Psychosocial:  support system consists of son Aashish and sitters     4) Spiritual: Yazdanism    5) Prognostication: anticipate life span to be less than 6 months due to presumed lung cancer.    6) Understanding of disease and Illness Trajectory: Ketan Zendejas  has  good understanding of her illness, they can benefit from continued education on what to expect in the future.      7) Goals of care:  Quality of life and care in the home  Advance Care Planning     Date: 04/16/2022  Ketan Zendejas would like to take her home with [facility]. He is her surrogate decision maker. She had previously expressed that she did not want life prolonging treatment.          8) Code status: DNR    9) Advance directives:none here.      Summary and recommendations:  Anticipate discharge tomorrow with [facility].  Would continue current meds.  Would use Morphine concentrate 20 mg/ml 0.5 ml every 1 hour as needed for shortness of breath.  Delirium/sundowning-may increase the quetiapine to extra 50 mg at night for now. May do better once home.  Assistive devices per hospice and son.  I have completed and signed a [place]. Needs to be reviewed by son and signed by him. I can show you if needed.      >16 min time was spent on advance care planning, goals of care discussion, emotional support, formulating and communicating prognosis and goals of care, exploring burden/benefit of various approaches of treatment.

## 2022-04-16 NOTE — SUBJECTIVE & OBJECTIVE
Past Medical History:   Diagnosis Date    Diabetes mellitus, type 2     Hypertension     Syncope and collapse        Past Surgical History:   Procedure Laterality Date    CARDIAC PACEMAKER PLACEMENT      CATARACT EXTRACTION      COLONOSCOPY N/A 2020    Procedure: COLONOSCOPY;  Surgeon: Toro Burgos MD;  Location: [facility] ([address] [place] [address]);  Service: Endoscopy;  Laterality: N/A;    ESOPHAGOGASTRODUODENOSCOPY N/A 2020    Procedure: EGD (ESOPHAGOGASTRODUODENOSCOPY);  Surgeon: Toro Burgos MD;  Location: [facility] ([address] [place] [address]);  Service: Endoscopy;  Laterality: N/A;    HYSTERECTOMY         Review of patient's allergies indicates:  No Known Allergies    Family History       Family history is unknown by patient.          Tobacco Use    Smoking status: Former Smoker     Years: 25.00     Quit date: 1974     Years since quittin.8    Smokeless tobacco: Never Used   Substance and Sexual Activity    Alcohol use: No     Alcohol/week: 0.0 standard drinks    Drug use: No    Sexual activity: Not Currently         Review of Systems   Reason unable to perform ROS: dementia.   Psychiatric/Behavioral:  Positive for agitation.    Objective:     Vital Signs (Most Recent):  Temp: 98.3 °F (36.8 °C) (22 1051)  Pulse: 64 (22 1051)  Resp: 18 (22 1001)  BP: (!) 161/88 (22 1051)  SpO2: 97 % (22 1051)   Vital Signs (24h Range):  Temp:  [94.5 °F (34.7 °C)-99.1 °F (37.3 °C)] 98.3 °F (36.8 °C)  Pulse:  [62-83] 64  Resp:  [16-18] 18  SpO2:  [93 %-97 %] 97 %  BP: (148-181)/(67-88) 161/88     Weight: 63.5 kg (140 lb 0.2 oz)  Body mass index is 27.34 kg/m².    No intake or output data in the 24 hours ending 22 1208    Physical Exam  Constitutional:       Appearance: Normal appearance.      Comments: Pleasant   HENT:      Head: Normocephalic and atraumatic.      Nose: Nose normal.      Mouth/Throat:      Mouth: Mucous membranes are dry.   Pulmonary:      Effort: Pulmonary effort is normal.       Breath sounds: Normal breath sounds.      Comments: Diminished BS at left lung base  Musculoskeletal:         General: No swelling or tenderness. Normal range of motion.   Skin:     General: Skin is warm and dry.   Neurological:      General: No focal deficit present.      Mental Status: She is alert.      Comments: Oriented to person   Psychiatric:         Mood and Affect: Mood normal.         Behavior: Behavior normal.       Vents:       Lines/Drains/Airways       Peripheral Intravenous Line  Duration                  Peripheral IV - Single Lumen 04/15/22 1654 20 G Anterior;Right Forearm <1 day                    Significant Labs:  Procalcitonin is normal    CBC/Anemia Profile:  Recent Labs   Lab 04/14/22  1241 04/15/22  0252 04/16/22  0327   WBC 11.11 8.17 5.48   HGB 9.2* 8.4* 9.0*   HCT 30.2* 27.1* 29.1*    189 188   MCV 85 84 84   RDW 22.1* 22.3* 22.1*        Chemistries:  Recent Labs   Lab 04/14/22  1214 04/15/22  0252 04/16/22  0327    135* 139   K 4.4 4.0 3.8    102 105   CO2 26 25 25   BUN 29* 23 24*   CREATININE 1.1 1.0 1.0   CALCIUM 9.8 9.0 9.0   ALBUMIN 3.6  --   --    PROT 7.0  --   --    BILITOT 0.4  --   --    ALKPHOS 63  --   --    ALT 19  --   --    AST 17  --   --        All pertinent labs within the past 24 hours have been reviewed.    Significant Imaging:   I have reviewed all pertinent imaging results/findings within the past 24 hours.    Personally reviewed CXR with son.  CXR 12/2021 with left perihilar opacity that is larger and more conspicuous on this admits CXR.      CT of chest with solid 6 cm perihilar mass with narrowing of the LLL airway.  GGO and infiltrates with air bronchograms at the base likely represents post obstructive process.  Small left pleural effusion.

## 2022-04-16 NOTE — ASSESSMENT & PLAN NOTE
88 y.o. female with Hx of PVD, DM, dementia, CKD3, presenting with several days of cough and SOB c/f PNA now with CT findings c/f lung cancer    Pneumonia (CAP)  L lung mass c/f lung cancer  Patient with Hypoxic Respiratory failure which is Acute.  she is not on home oxygen. Supplemental oxygen was provided and noted-  .   Signs/symptoms of respiratory failure include- tachypnea. The leading etiology is pneumonia at this time, however CHF is not ruled out given her elevated BNP. Her CXR showed L sided developing consolidation and possible effusion, but her lack of fever or true leukocytosis is concerning for an alternate etiology. Will treat empirically for PNA and work up for alternate causes such as CHF. However CT shows 5cm c/f malignancy with extension to mediastinum, discussed with pulm for possible outpatient v inpatient bronch w/ biopsy. But given her functional status and dementia they recommended palliative care consultation. Plan for home hospice  - CTX1g  Q24H (4/14-4/18)  - Azithro 500mg daily 4/14-4/16  - Pulm consulted, appreciate recs  - Palliative consulted, appreciate recs  - SW helping arrange home hospice

## 2022-04-17 VITALS
HEIGHT: 60 IN | TEMPERATURE: 98 F | HEART RATE: 62 BPM | DIASTOLIC BLOOD PRESSURE: 72 MMHG | OXYGEN SATURATION: 97 % | WEIGHT: 140 LBS | BODY MASS INDEX: 27.48 KG/M2 | RESPIRATION RATE: 20 BRPM | SYSTOLIC BLOOD PRESSURE: 172 MMHG

## 2022-04-17 LAB
POCT GLUCOSE: 150 MG/DL (ref 70–110)
POCT GLUCOSE: 177 MG/DL (ref 70–110)

## 2022-04-17 PROCEDURE — 25000003 PHARM REV CODE 250: Performed by: HOSPITALIST

## 2022-04-17 PROCEDURE — 63600175 PHARM REV CODE 636 W HCPCS: Performed by: STUDENT IN AN ORGANIZED HEALTH CARE EDUCATION/TRAINING PROGRAM

## 2022-04-17 PROCEDURE — 25000003 PHARM REV CODE 250: Performed by: STUDENT IN AN ORGANIZED HEALTH CARE EDUCATION/TRAINING PROGRAM

## 2022-04-17 PROCEDURE — 99239 HOSP IP/OBS DSCHRG MGMT >30: CPT | Mod: ,,, | Performed by: STUDENT IN AN ORGANIZED HEALTH CARE EDUCATION/TRAINING PROGRAM

## 2022-04-17 PROCEDURE — 99239 PR HOSPITAL DISCHARGE DAY,>30 MIN: ICD-10-PCS | Mod: ,,, | Performed by: STUDENT IN AN ORGANIZED HEALTH CARE EDUCATION/TRAINING PROGRAM

## 2022-04-17 RX ORDER — HYDRALAZINE HYDROCHLORIDE 50 MG/1
50 TABLET, FILM COATED ORAL 3 TIMES DAILY PRN
Qty: 90 TABLET | Refills: 11 | Status: SHIPPED | OUTPATIENT
Start: 2022-04-17 | End: 2023-04-17

## 2022-04-17 RX ORDER — MORPHINE SULFATE ORAL SOLUTION 10 MG/5ML
5 SOLUTION ORAL
Qty: 100 ML | Refills: 0 | Status: SHIPPED | OUTPATIENT
Start: 2022-04-17

## 2022-04-17 RX ORDER — MORPHINE SULFATE ORAL SOLUTION 10 MG/5ML
5 SOLUTION ORAL
Qty: 100 ML | Refills: 0 | Status: SHIPPED | OUTPATIENT
Start: 2022-04-17 | End: 2022-04-17 | Stop reason: SDUPTHER

## 2022-04-17 RX ORDER — HYDRALAZINE HYDROCHLORIDE 50 MG/1
50 TABLET, FILM COATED ORAL EVERY 8 HOURS PRN
Status: DISCONTINUED | OUTPATIENT
Start: 2022-04-17 | End: 2022-04-17 | Stop reason: HOSPADM

## 2022-04-17 RX ORDER — LACTOBACILLUS COMBINATION NO.4 3B CELL
1 CAPSULE ORAL DAILY
Qty: 5 CAPSULE | Refills: 0 | Status: SHIPPED | OUTPATIENT
Start: 2022-04-17

## 2022-04-17 RX ORDER — HYDRALAZINE HYDROCHLORIDE 50 MG/1
50 TABLET, FILM COATED ORAL ONCE
Status: COMPLETED | OUTPATIENT
Start: 2022-04-17 | End: 2022-04-17

## 2022-04-17 RX ADMIN — DOCUSATE SODIUM 100 MG: 100 CAPSULE, LIQUID FILLED ORAL at 08:04

## 2022-04-17 RX ADMIN — CHOLECALCIFEROL TAB 125 MCG (5000 UNIT) 5000 UNITS: 125 TAB at 09:04

## 2022-04-17 RX ADMIN — CEFTRIAXONE 1 G: 1 INJECTION, SOLUTION INTRAVENOUS at 02:04

## 2022-04-17 RX ADMIN — LISINOPRIL 20 MG: 20 TABLET ORAL at 08:04

## 2022-04-17 RX ADMIN — HYDRALAZINE HYDROCHLORIDE 25 MG: 25 TABLET, FILM COATED ORAL at 09:04

## 2022-04-17 RX ADMIN — QUETIAPINE FUMARATE 25 MG: 25 TABLET ORAL at 11:04

## 2022-04-17 RX ADMIN — HYDRALAZINE HYDROCHLORIDE 50 MG: 50 TABLET ORAL at 12:04

## 2022-04-17 NOTE — NURSING
Patient and responsible party given discharge instructions and education. No concerns verbalized at this time. Son requested IV ABT be administered prior to leaving hospital. Doctor aware and approves. Awaiting medication from pharmacy.

## 2022-04-17 NOTE — NURSING
IV ABT completed. No distress noted. IV discontinued, cath intact, tolerated well. Transport at bedside to assist patient to son's vehicle.

## 2022-04-17 NOTE — DISCHARGE SUMMARY
Discharge Summary  Hospital Medicine  [facility]      Attending Physician on Discharge: Antony Chavez MD  Hospital Medicine Team: [facility]  Date of Admission:  4/14/2022     Date of Discharge:   04/17/2022    Code status: DNR (Do Not Resuscitate)    Active Hospital Problems    Diagnosis  POA    *Acute hypoxemic respiratory failure [J96.01]  Yes    Hilar mass [R91.8]  Yes     CXR 12/2021 with left perihilar opacity that is larger and more conspicuous on current imaging 4/2022.    CT of chest confirms a solid 6 cm perihilar mass with narrowing of the LLL airway.  GGO and infiltrates with air bronchograms at the base likely represents post obstructive process.  Small left pleural effusion.     Patient is a former smoker with an enlarging mass.  This is certainly concerning for and most likely represents malignancy.    Patient has clinically improved with treatment for post obstructive pneumonia.    Son understands that this enlarging mass likely represents lung cancer.  He does not wish to pursue a diagnosis at this time as she is not an ideal candidate for treatment.  In addition, he stated that these were Mrs. Childers's  wishes before she developed dementia related cognitive decline.      Palliative care consult to pursue comfort measures.      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       Consults: pulmonology, palliative care     History of Present Illness:  88 y.o. female with Hx of PVD, DM, dementia, CKD3, presenting with several days of cough and SOB c/f PNA     Of note the patient was admitted in January with CAP  and recovered well. Most of the history is provided by her son due to the patient's dementia.     3 days prior to admission patient started having a slight cough, this worsened over the next several days and was more pronounced at night, also relieved somewhat by sitting up. On the day prior to admission the patient's son also noted some work of breathing and FRANK. No fevers, no sick contacts. Of note the patient is not on lasix and was only briefly on lasix during a prior admission.     Because of the SOB her son decided to bring her to the emergency room. In the ED she was HTN to 164/71, HR 78, RR 16, satting low on RA (90%), her CXR was notable for a possible developing consolidation in the LLL as well as possible small L pleural effusion, concerning for aspiration or PNA.     Labs were notable for WBC 11 (previous was 5-6), baseline hgb and Cr 1.1 (baseline), and  (slightly above baseline). She was given azithro and ctx and admitted for CAP.        Overview/Hospital Course:  Breathing stable, CT showed 5cm mass in LLL with extension to mediastinum c/f maligancy. Pulm consulted but since she is not an ideal candidate for treatment they recommended palliative consultation. Palliative care discussed with patient's son and they agreed on home hospice.    Hospital Course by Problem:  Pneumonia (CAP)  L lung mass c/f lung cancer  Patient with Hypoxic Respiratory failure which is Acute.  she is not on home oxygen. Supplemental oxygen was provided and noted-  .   Signs/symptoms of respiratory failure include- tachypnea. The leading etiology is pneumonia at this time, however CHF is not ruled out given her elevated BNP. Her CXR showed L sided developing consolidation and possible effusion, but her lack of fever or true leukocytosis is concerning for an alternate etiology. Will treat empirically for PNA and work up for alternate causes such as CHF. However CT shows 5cm c/f malignancy with extension to mediastinum,  discussed with pulm for possible outpatient v inpatient bronch w/ biopsy. But given her functional status and dementia they recommended palliative care consultation. Plan for home hospice  - CTX1g  Q24H (4/14-4/18)  - Azithro 500mg daily 4/14-4/16  - Pulm consulted, appreciate recs  - Palliative consulted, appreciate recs  - Cefdinir 300mg BID for 4 days  - Morphine 5mg Q2H PRN for dyspnea per palliative  - Home hospice     Dementia with behavioral disturbance  Patient pleasant at baseline, but with notable dementia, unable to recall her symptoms. 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, without long-term current use of insulin, unspecified laterality  Controlled without medications per son      Laboratory Values:  Recent Labs   Lab 04/14/22  1241 04/15/22  0252 04/16/22  0327   WBC 11.11 8.17 5.48   HGB 9.2* 8.4* 9.0*   HCT 30.2* 27.1* 29.1*    189 188     Recent Labs   Lab 04/14/22  1214 04/15/22  0252 04/16/22  0327    135* 139   K 4.4 4.0 3.8    102 105   CO2 26 25 25   BUN 29* 23 24*   CREATININE 1.1 1.0 1.0   * 164* 123*   CALCIUM 9.8 9.0 9.0     Recent Labs   Lab 04/14/22  1214   ALKPHOS 63   ALT 19   AST 17   ALBUMIN 3.6   PROT 7.0   BILITOT 0.4      Recent Labs   Lab 04/15/22  2110 04/16/22  0706 04/16/22  1051 04/16/22  1547 04/16/22  2116 04/17/22  0656   POCTGLUCOSE 131* 147* 172* 159* 219* 150*     No results for input(s): CPK, CPKMB, MB, TROPONINI in the last 72 hours.      Microbiology:  Microbiology Results (last 7 days)     Procedure Component Value Units Date/Time    Culture, Respiratory with Gram Stain [[id number]]     Order Status: Canceled Specimen: Respiratory           Imaging:  Imaging Results           "X-Ray Chest PA And Lateral (Final result)  Result time 04/14/22 12:30:45    Final result by Irving Osborn MD (04/14/22 12:30:45)                 Impression:      New airspace disease versus consolidation in the left lung base with possible left pleural effusion.  Findings are likely related to pneumonia or aspiration.    Left perihilar opacity which may represent additional airspace disease though suggest continued attention on follow-up to exclude underlying mass.    Hiatal hernia.      Electronically signed by: Irving Osborn MD  Date:    04/14/2022  Time:    12:30             Narrative:    EXAMINATION:  XR CHEST PA AND LATERAL    CLINICAL HISTORY:  Provided history is "  Shortness of breath".    TECHNIQUE:  Frontal and lateral views of the chest were performed.    COMPARISON:  12/18/2021.    FINDINGS:  Cardiomediastinal silhouette is mildly enlarged and stable in size.  Left chest wall cardiac pacing device is present with transvenous leads overlying the heart.  There is central vascular congestion and coarsened perihilar interstitial lung markings.  New airspace disease/consolidation in the left lung base with possible adjacent pleural effusion.  There is a subtle opacity in the left perihilar region.  No sizable right pleural effusion.  No distinct pneumothorax.  Hiatal hernia is present.  Rim calcified structure overlies the upper abdomen which may be related to the gallbladder.  There is a remote appearing compression deformity in the lower thoracic or upper lumbar spine.                                    Cardiac:      Results for orders placed during the hospital encounter of 03/02/20    Echo Color Flow Doppler? Yes    Interpretation Summary  · Concentric left ventricular hypertrophy.  · Normal left ventricular systolic function. The estimated ejection fraction is 65%.  · Normal right ventricular systolic function.  · Grade II (moderate) left ventricular diastolic dysfunction consistent with " pseudonormalization.  · Severe left atrial enlargement.  · Severe eccentric mitral regurgitation.  · Normal central venous pressure (3 mmHg).        Procedures:   * No surgery found *        Current Discharge Medication List      START taking these medications    Details   cefdinir (OMNICEF) 300 MG capsule Take 1 capsule (300 mg total) by mouth 2 (two) times daily. for 4 days  Qty: 8 capsule, Refills: 0      hydrALAZINE (APRESOLINE) 50 MG tablet Take 1 tablet (50 mg total) by mouth 3 (three) times daily as needed (if systolic blood pressure is >160).  Qty: 90 tablet, Refills: 11    Comments: .      lactobacillus combination no.4 (PROBIOTIC) 3 billion cell Cap Take 1 capsule by mouth Daily.  Qty: 5 capsule, Refills: 0      morphine 10 mg/5 mL solution Take 2.5 mLs (5 mg total) by mouth every 2 (two) hours as needed (Dyspnea).  Qty: 100 mL, Refills: 0    Comments: Quantity prescribed more than 7 day supply? No         CONTINUE these medications which have NOT CHANGED    Details   alpha lipoic acid 300 mg Cap Take by mouth.      !! blood sugar diagnostic (ACCU-CHEK GERMÁN PLUS TEST STRP) Strp USE ONE STRIP TO TEST DAILY.  Qty: 100 strip, Refills: 11      !! blood sugar diagnostic (ACCU-CHEK GERMÁN PLUS TEST STRP) Strp USE ONE STRIP TO TEST DAILY.  Qty: 300 strip, Refills: 1      cholecalciferol, vitamin D3, 125 mcg (5,000 unit) Tab Take 5,000 Units by mouth once daily.       co-enzyme Q-10 30 mg capsule Take 100 mg by mouth once daily.       cranberry 400 mg Cap Take by mouth.      diclofenac sodium (VOLTAREN) 1 % Gel Apply 2 g topically once daily.  Qty: 100 g, Refills: 3      docusate sodium (COLACE ORAL) Take by mouth once daily.      ferrous sulfate (FEOSOL) 325 mg (65 mg iron) Tab tablet Take 1 tablet (325 mg total) by mouth daily with breakfast.  Qty: 30 tablet, Refills: 3      hydrOXYzine HCL (ATARAX) 25 MG tablet Take 1 tablet (25 mg total) by mouth every evening.  Qty: 30 tablet, Refills: 11      !! lancets  (ONETOUCH DELICA LANCETS) 33 gauge Misc Check fasting glucose daily  Qty: 300 each, Refills: 3      !! lancets Misc Check glucose daily  Qty: 100 each, Refills: 3      LIDOcaine (LIDODERM) 5 % Place 1 patch onto the skin once daily. Remove & Discard patch within 12 hours or as directed by MD  Qty: 30 patch, Refills: 3      lisinopriL 10 MG tablet Take 1 tablet (10 mg total) by mouth 2 (two) times daily.  Qty: 180 tablet, Refills: 3    Comments: .      mirtazapine (REMERON) 15 MG tablet Take 1 tablet (15 mg total) by mouth every evening.  Qty: 30 tablet, Refills: 11      pantoprazole (PROTONIX) 40 MG tablet Take 1 tablet (40 mg total) by mouth once daily.  Qty: 30 tablet, Refills: 11      QUEtiapine (SEROQUEL) 25 MG Tab Take 1 tablet (25 mg total) by mouth 2 (two) times daily as needed (agitation). Take 1/2 tablet po nightly prn insomnia  Qty: 60 tablet, Refills: 11      senna (SENOKOT) 8.6 mg tablet Take 1 tablet by mouth daily as needed for Constipation.  Qty: 90 tablet, Refills: 3      TURMERIC ORAL Take by mouth.      vit A/vit C/vit E/zinc/copper (OCUVITE PRESERVISION ORAL) Take by mouth.       !! - Potential duplicate medications found. Please discuss with provider.      STOP taking these medications       furosemide (LASIX) 20 MG tablet Comments:   Reason for Stopping:                 Discharge Diet:regular diet with Normal Fluid intake of 1500 - 2000 mL per day    Activity: activity as tolerated    Discharge Condition: Fair    Disposition: Hospice/Home    Follow up:        Tests pending at the time of discharge: none        Time spent  on the discharge of the patient including review of hospital course with the patient. reviewing discharge medications and arranging follow-up care: >30 mins    Discharge examination: Patient was seen and examined on the date of discharge and determined to be suitable for discharge.        Antony Chavez M.D.  Department of Hospital Medicine  [facility]

## 2022-04-17 NOTE — PLAN OF CARE
MONIQUE contacted Gianni with [facility] ([phone]) regarding referral for home hospice.   Hospice orders, H & P and facesheet faxed to [phone].   [facility] will not be able to begin services until tomorrow.   MONIQUE spoke with patient's son, Aashish, who states the patient already has a hospital bed and other equipment at home.   He wishes to bring his mother home today with hospice care to begin tomorrow.   Gianni will review information and contact the patient's son.

## 2022-04-17 NOTE — PLAN OF CARE
[facility] representative will meet patient and her son at their home tomorrow between 8 AM - 10 AM to initiate home hospice services.   Patient will discharge home with her son tpday.

## 2022-04-17 NOTE — PLAN OF CARE
Problem: Respiratory Compromise (Pneumonia)  Goal: Effective Oxygenation and Ventilation  Outcome: Ongoing, Progressing  Intervention: Promote Airway Secretion Clearance  Flowsheets (Taken 4/17/2022 0541)  Breathing Techniques/Airway Clearance: deep/controlled cough encouraged  Intervention: Optimize Oxygenation and Ventilation  Flowsheets (Taken 4/17/2022 0541)  Airway/Ventilation Management: airway patency maintained  Head of Bed (HOB) Positioning: HOB at 30-45 degrees

## 2022-04-19 NOTE — PHYSICIAN QUERY
PT Name: Luiza Childers  MR #: [id number]     DOCUMENTATION CLARIFICATION     CDS/: Carson Martin RN, CCDS       Contact information:   [email or web address]      This form is a permanent document in the medical record.     Query Date: April 19, 2022    By submitting this query, we are merely seeking further clarification of documentation.  Please utilize your independent clinical judgment when addressing the question(s) below.    The Medical Record contains the following   Indicators Supporting Clinical Findings Location in Medical Record    Heart Failure documented     x BNP BNP: 298   4/14 labs     x EF/Echo   estimated ejection fraction is 65%.  Grade II (moderate) left ventricular diastolic dysfunction consistent with pseudonormalization   3/2/2020 past TTE      x Radiology findings CXR:Cardiomediastinal silhouette is mildly enlarged and stable in size    Heart: Mild cardiomegaly   4/14 CXR      4/15 CT chest   x Subjective/Objective Respiratory Conditions SOB, Acute respiratory failure, cough;  L- crackles ;   4/14 H&P Hospital medicine     Recent/Current MI      Heart Transplant, LVAD      Edema, JVD      Ascites     x Diuretics/Meds Home meds : lisinopril; lasix    MAR: lisinopril , po; 4/15-17 4/14 H&P, Hospital medicine     MAR    Other Treatment      Other       Heart failure is a clinical diagnosis which includes symptomatic fluid retention, elevated intracardiac pressures, and/or the inability of the heart to deliver adequate blood flow.    Heart Failure with preserved Ejection Fraction (HFpEF) or Diastolic Heart Failure (stiff ventricles, does not relax properly, EF is >50%)     Common clues to acute exacerbation:  Rapidly progressive symptoms (w/in 2 weeks of presentation), using IV diuretics, using supplemental O2, pulmonary edema on Xray, new or worsening pleural effusion, +JVD or other signs of volume overload, MI w/in 4 weeks, and/or BNP >500  The clinical guidelines noted are only  system guidelines, and do not replace the providers clinical judgment.    Provider, please determine whether heart failure condition is associated with the above clinical findings.    [   ]  Acute Diastolic Heart Failure (HFpEF) - new diagnosis   [   ]  Acute on Chronic Diastolic Heart Failure (HFpEF) - worsening of CHF signs/symptoms in preexisting CHF   [  x ]  Chronic Diastolic Heart Failure (HFpEF) - preexisting and stable   [   ]  Heart Failure Ruled Out   [   ]  Other (please specify): ___________________________________   [  ]  Clinically Undetermined       Please document in your progress notes daily for the duration of treatment until resolved and include in your discharge summary.    References:  American Heart Association editorial staff. (2017, May). Ejection Fraction Heart Failure Measurement. American Heart Association. https://www.heart.org/en/health-topics/heart-failure/diagnosing-heart-failure/ejection-fraction-heart-failure-measurement#:~:text=Ejection%20fraction%20(EF)%20is%20a,pushed%20out%20with%20each%20heartbeat  WILLIAM Puente (2020, December 15). Heart failure with preserved ejection fraction: Clinical manifestations and diagnosis. [employer]. https://www.[employer].com/contents/heart-failure-with-preserved-ejection-fraction-clinical-manifestations-and-diagnosis.  Form No. 61862

## 2022-04-30 RX ORDER — QUETIAPINE FUMARATE 25 MG/1
TABLET, FILM COATED ORAL
Qty: 45 TABLET | Refills: 1 | Status: SHIPPED | OUTPATIENT
Start: 2022-04-30

## 2022-04-30 NOTE — TELEPHONE ENCOUNTER
No new care gaps identified.  Powered by [employer]. Reference number: [id number].   4/29/2022 7:28:32 PM CDT

## 2022-05-03 ENCOUNTER — OUTPATIENT CASE MANAGEMENT (OUTPATIENT)
Dept: ADMINISTRATIVE | Facility: OTHER | Age: 87
End: 2022-05-03
Payer: MEDICARE

## 2022-05-06 ENCOUNTER — TELEPHONE (OUTPATIENT)
Dept: HEMATOLOGY/ONCOLOGY | Facility: CLINIC | Age: 87
End: 2022-05-06
Payer: MEDICARE

## 2022-05-20 ENCOUNTER — TELEPHONE (OUTPATIENT)
Dept: HEMATOLOGY/ONCOLOGY | Facility: CLINIC | Age: 87
End: 2022-05-20
Payer: MEDICARE

## 2022-05-20 NOTE — TELEPHONE ENCOUNTER
Spoke with  regarding his mother's appointments scheduled for 5/20/22 with . He stated that  was hospitalized on Good Friday and discharged on Easter Sunday. The inpatient pulmonology team found a lump on her lung and suspected it to be malignant. Pt was put on hospice.  declined to reschedule at this time.

## 2022-07-07 ENCOUNTER — TELEPHONE (OUTPATIENT)
Dept: CARDIOLOGY | Facility: HOSPITAL | Age: 87
End: 2022-07-07
Payer: MEDICARE

## 2022-07-07 NOTE — TELEPHONE ENCOUNTER
----- Message from Brian Don MD sent at 7/7/2022 12:32 PM CDT -----  Since she is in hospice, I would not add any medications. thanks  ----- Message -----  From: Shanta Shore RN  Sent: 7/7/2022  11:51 AM CDT  To: Brian Don MD      ----- Message -----  From: Stacy Hamlin NP  Sent: 7/7/2022  11:27 AM CDT  To: Shanta Shore RN    I would let MD on consult know this since Dr. Del Angel is out of town. thanks.  ----- Message -----  From: Shanta Shore RN  Sent: 7/7/2022  11:16 AM CDT  To: Suman Del Angel MD    Alert transmission received for long AT/AF episode. This appears to be new onset AF. There is no documented AF and pt is not on OAC. Egram is c/w AF that is ongoing for 4 hrs. Called pt to inquire about symptoms. S/w pt's son who asked pt how she was feeling. Pt was unable to state if she had any symptoms. Asked son to help her send a manual transmission to see if she was still out of rhythm. At this point, the pt's son informed me that the pt is in hospice care. She was currently away from the monitor and he said he would send a transmission later. Informed him that I would let Dr. Del Angel know and we would continue to monitor. Please advise if anything else needs to be done.

## 2022-07-09 ENCOUNTER — CLINICAL SUPPORT (OUTPATIENT)
Dept: CARDIOLOGY | Facility: HOSPITAL | Age: 87
End: 2022-07-09
Payer: MEDICARE

## 2022-07-09 DIAGNOSIS — R00.1 BRADYCARDIA, UNSPECIFIED: ICD-10-CM

## 2022-07-09 DIAGNOSIS — Z95.0 PRESENCE OF CARDIAC PACEMAKER: ICD-10-CM

## 2022-07-09 DIAGNOSIS — I48.91 UNSPECIFIED ATRIAL FIBRILLATION: ICD-10-CM

## 2022-07-09 PROCEDURE — 93294 REM INTERROG EVL PM/LDLS PM: CPT | Mod: GW,,, | Performed by: INTERNAL MEDICINE

## 2022-07-09 PROCEDURE — 93294 CARDIAC DEVICE CHECK - REMOTE: ICD-10-PCS | Mod: GW,,, | Performed by: INTERNAL MEDICINE

## 2022-07-29 ENCOUNTER — PES CALL (OUTPATIENT)
Dept: ADMINISTRATIVE | Facility: CLINIC | Age: 87
End: 2022-07-29
Payer: MEDICARE

## 2022-10-07 ENCOUNTER — CLINICAL SUPPORT (OUTPATIENT)
Dept: CARDIOLOGY | Facility: HOSPITAL | Age: 87
End: 2022-10-07
Payer: MEDICARE

## 2022-10-07 DIAGNOSIS — I48.91 UNSPECIFIED ATRIAL FIBRILLATION: ICD-10-CM

## 2022-10-07 DIAGNOSIS — Z95.0 PRESENCE OF CARDIAC PACEMAKER: ICD-10-CM

## 2022-10-07 DIAGNOSIS — R00.1 BRADYCARDIA, UNSPECIFIED: ICD-10-CM

## 2022-10-07 DIAGNOSIS — R55 SYNCOPE AND COLLAPSE: ICD-10-CM

## 2022-12-16 ENCOUNTER — PATIENT MESSAGE (OUTPATIENT)
Dept: ADMINISTRATIVE | Facility: HOSPITAL | Age: 87
End: 2022-12-16
Payer: MEDICARE

## 2023-03-17 ENCOUNTER — PATIENT MESSAGE (OUTPATIENT)
Dept: ADMINISTRATIVE | Facility: HOSPITAL | Age: 88
End: 2023-03-17